# Patient Record
Sex: MALE | Race: WHITE | NOT HISPANIC OR LATINO | Employment: FULL TIME | URBAN - NONMETROPOLITAN AREA
[De-identification: names, ages, dates, MRNs, and addresses within clinical notes are randomized per-mention and may not be internally consistent; named-entity substitution may affect disease eponyms.]

---

## 2020-12-08 ENCOUNTER — ALLIED HEALTH/NURSE VISIT (OUTPATIENT)
Dept: FAMILY MEDICINE | Facility: OTHER | Age: 25
End: 2020-12-08
Attending: FAMILY MEDICINE
Payer: COMMERCIAL

## 2020-12-08 DIAGNOSIS — R05.9 COUGH: Primary | ICD-10-CM

## 2020-12-08 PROCEDURE — U0003 INFECTIOUS AGENT DETECTION BY NUCLEIC ACID (DNA OR RNA); SEVERE ACUTE RESPIRATORY SYNDROME CORONAVIRUS 2 (SARS-COV-2) (CORONAVIRUS DISEASE [COVID-19]), AMPLIFIED PROBE TECHNIQUE, MAKING USE OF HIGH THROUGHPUT TECHNOLOGIES AS DESCRIBED BY CMS-2020-01-R: HCPCS | Mod: ZL | Performed by: FAMILY MEDICINE

## 2020-12-08 PROCEDURE — 99207 PR NO CHARGE NURSE ONLY: CPT

## 2020-12-08 PROCEDURE — C9803 HOPD COVID-19 SPEC COLLECT: HCPCS

## 2020-12-09 LAB
SARS-COV-2 RNA SPEC QL NAA+PROBE: NOT DETECTED
SPECIMEN SOURCE: NORMAL

## 2020-12-14 ENCOUNTER — HEALTH MAINTENANCE LETTER (OUTPATIENT)
Age: 25
End: 2020-12-14

## 2021-10-03 ENCOUNTER — HEALTH MAINTENANCE LETTER (OUTPATIENT)
Age: 26
End: 2021-10-03

## 2021-11-08 ENCOUNTER — HOSPITAL ENCOUNTER (INPATIENT)
Facility: HOSPITAL | Age: 26
LOS: 2 days | Discharge: HOME | End: 2021-11-10
Attending: STUDENT IN AN ORGANIZED HEALTH CARE EDUCATION/TRAINING PROGRAM | Admitting: STUDENT IN AN ORGANIZED HEALTH CARE EDUCATION/TRAINING PROGRAM
Payer: COMMERCIAL

## 2021-11-08 DIAGNOSIS — T14.91XA SUICIDE ATTEMPT (H): Primary | ICD-10-CM

## 2021-11-08 LAB
ALBUMIN SERPL-MCNC: 4.4 G/DL (ref 3.4–5)
ALP SERPL-CCNC: 87 U/L (ref 40–150)
ALT SERPL W P-5'-P-CCNC: 64 U/L (ref 0–70)
AMPHETAMINES UR QL: NOT DETECTED
ANION GAP SERPL CALCULATED.3IONS-SCNC: 4 MMOL/L (ref 3–14)
APAP SERPL-MCNC: <2 MG/L (ref 10–30)
AST SERPL W P-5'-P-CCNC: 27 U/L (ref 0–45)
BARBITURATES UR QL SCN: NOT DETECTED
BENZODIAZ UR QL SCN: NOT DETECTED
BILIRUB SERPL-MCNC: 0.5 MG/DL (ref 0.2–1.3)
BUN SERPL-MCNC: 16 MG/DL (ref 7–30)
BUPRENORPHINE UR QL: NOT DETECTED
CALCIUM SERPL-MCNC: 9.3 MG/DL (ref 8.5–10.1)
CANNABINOIDS UR QL: NOT DETECTED
CHLORIDE BLD-SCNC: 109 MMOL/L (ref 94–109)
CO2 SERPL-SCNC: 27 MMOL/L (ref 20–32)
COCAINE UR QL SCN: NOT DETECTED
COHGB MFR BLD: 1.6 % (ref 0–2)
CREAT SERPL-MCNC: 0.97 MG/DL (ref 0.66–1.25)
D-METHAMPHET UR QL: NOT DETECTED
ERYTHROCYTE [DISTWIDTH] IN BLOOD BY AUTOMATED COUNT: 13.1 % (ref 10–15)
GFR SERPL CREATININE-BSD FRML MDRD: >90 ML/MIN/1.73M2
GLUCOSE BLD-MCNC: 86 MG/DL (ref 70–99)
HCT VFR BLD AUTO: 47.2 % (ref 40–53)
HGB BLD-MCNC: 16.3 G/DL (ref 13.3–17.7)
LACTATE SERPL-SCNC: 1 MMOL/L (ref 0.7–2)
MCH RBC QN AUTO: 28.7 PG (ref 26.5–33)
MCHC RBC AUTO-ENTMCNC: 34.5 G/DL (ref 31.5–36.5)
MCV RBC AUTO: 83 FL (ref 78–100)
METHADONE UR QL SCN: NOT DETECTED
OPIATES UR QL SCN: NOT DETECTED
OXYCODONE UR QL SCN: NOT DETECTED
PCP UR QL SCN: NOT DETECTED
PLATELET # BLD AUTO: 260 10E3/UL (ref 150–450)
POTASSIUM BLD-SCNC: 3.9 MMOL/L (ref 3.4–5.3)
PROPOXYPH UR QL: NOT DETECTED
PROT SERPL-MCNC: 8 G/DL (ref 6.8–8.8)
RBC # BLD AUTO: 5.68 10E6/UL (ref 4.4–5.9)
SARS-COV-2 RNA RESP QL NAA+PROBE: NEGATIVE
SODIUM SERPL-SCNC: 140 MMOL/L (ref 133–144)
TRICYCLICS UR QL SCN: NOT DETECTED
WBC # BLD AUTO: 9.5 10E3/UL (ref 4–11)

## 2021-11-08 PROCEDURE — 99223 1ST HOSP IP/OBS HIGH 75: CPT | Performed by: NURSE PRACTITIONER

## 2021-11-08 PROCEDURE — 99284 EMERGENCY DEPT VISIT MOD MDM: CPT | Performed by: STUDENT IN AN ORGANIZED HEALTH CARE EDUCATION/TRAINING PROGRAM

## 2021-11-08 PROCEDURE — C9803 HOPD COVID-19 SPEC COLLECT: HCPCS

## 2021-11-08 PROCEDURE — 83605 ASSAY OF LACTIC ACID: CPT | Performed by: STUDENT IN AN ORGANIZED HEALTH CARE EDUCATION/TRAINING PROGRAM

## 2021-11-08 PROCEDURE — 93010 ELECTROCARDIOGRAM REPORT: CPT | Performed by: INTERNAL MEDICINE

## 2021-11-08 PROCEDURE — 124N000001 HC R&B MH

## 2021-11-08 PROCEDURE — 82375 ASSAY CARBOXYHB QUANT: CPT | Performed by: STUDENT IN AN ORGANIZED HEALTH CARE EDUCATION/TRAINING PROGRAM

## 2021-11-08 PROCEDURE — 93005 ELECTROCARDIOGRAM TRACING: CPT

## 2021-11-08 PROCEDURE — 80143 DRUG ASSAY ACETAMINOPHEN: CPT | Performed by: STUDENT IN AN ORGANIZED HEALTH CARE EDUCATION/TRAINING PROGRAM

## 2021-11-08 PROCEDURE — 250N000013 HC RX MED GY IP 250 OP 250 PS 637: Performed by: NURSE PRACTITIONER

## 2021-11-08 PROCEDURE — 99285 EMERGENCY DEPT VISIT HI MDM: CPT | Mod: 25

## 2021-11-08 PROCEDURE — 85027 COMPLETE CBC AUTOMATED: CPT | Performed by: STUDENT IN AN ORGANIZED HEALTH CARE EDUCATION/TRAINING PROGRAM

## 2021-11-08 PROCEDURE — 36415 COLL VENOUS BLD VENIPUNCTURE: CPT | Performed by: STUDENT IN AN ORGANIZED HEALTH CARE EDUCATION/TRAINING PROGRAM

## 2021-11-08 PROCEDURE — 80306 DRUG TEST PRSMV INSTRMNT: CPT | Performed by: STUDENT IN AN ORGANIZED HEALTH CARE EDUCATION/TRAINING PROGRAM

## 2021-11-08 PROCEDURE — U0003 INFECTIOUS AGENT DETECTION BY NUCLEIC ACID (DNA OR RNA); SEVERE ACUTE RESPIRATORY SYNDROME CORONAVIRUS 2 (SARS-COV-2) (CORONAVIRUS DISEASE [COVID-19]), AMPLIFIED PROBE TECHNIQUE, MAKING USE OF HIGH THROUGHPUT TECHNOLOGIES AS DESCRIBED BY CMS-2020-01-R: HCPCS | Performed by: STUDENT IN AN ORGANIZED HEALTH CARE EDUCATION/TRAINING PROGRAM

## 2021-11-08 PROCEDURE — 80053 COMPREHEN METABOLIC PANEL: CPT | Performed by: STUDENT IN AN ORGANIZED HEALTH CARE EDUCATION/TRAINING PROGRAM

## 2021-11-08 RX ORDER — NICOTINE 21 MG/24HR
1 PATCH, TRANSDERMAL 24 HOURS TRANSDERMAL DAILY
Status: DISCONTINUED | OUTPATIENT
Start: 2021-11-08 | End: 2021-11-10

## 2021-11-08 RX ORDER — OLANZAPINE 10 MG/1
10 TABLET ORAL 2 TIMES DAILY PRN
Status: DISCONTINUED | OUTPATIENT
Start: 2021-11-08 | End: 2021-11-10 | Stop reason: HOSPADM

## 2021-11-08 RX ORDER — ACETAMINOPHEN 325 MG/1
650 TABLET ORAL EVERY 4 HOURS PRN
Status: DISCONTINUED | OUTPATIENT
Start: 2021-11-08 | End: 2021-11-10 | Stop reason: HOSPADM

## 2021-11-08 RX ORDER — HYDROXYZINE PAMOATE 25 MG/1
25 CAPSULE ORAL 3 TIMES DAILY PRN
Status: ON HOLD | COMMUNITY
Start: 2021-09-14 | End: 2021-11-10

## 2021-11-08 RX ORDER — AMOXICILLIN 250 MG
1 CAPSULE ORAL 2 TIMES DAILY PRN
Status: DISCONTINUED | OUTPATIENT
Start: 2021-11-08 | End: 2021-11-10 | Stop reason: HOSPADM

## 2021-11-08 RX ORDER — SERTRALINE HYDROCHLORIDE 100 MG/1
100 TABLET, FILM COATED ORAL DAILY
Status: ON HOLD | COMMUNITY
Start: 2021-09-14 | End: 2021-11-10

## 2021-11-08 RX ORDER — LANOLIN ALCOHOL/MO/W.PET/CERES
3 CREAM (GRAM) TOPICAL
Status: DISCONTINUED | OUTPATIENT
Start: 2021-11-08 | End: 2021-11-10 | Stop reason: HOSPADM

## 2021-11-08 RX ORDER — OLANZAPINE 10 MG/2ML
10 INJECTION, POWDER, FOR SOLUTION INTRAMUSCULAR 2 TIMES DAILY PRN
Status: DISCONTINUED | OUTPATIENT
Start: 2021-11-08 | End: 2021-11-10 | Stop reason: HOSPADM

## 2021-11-08 RX ORDER — MAGNESIUM HYDROXIDE/ALUMINUM HYDROXICE/SIMETHICONE 120; 1200; 1200 MG/30ML; MG/30ML; MG/30ML
30 SUSPENSION ORAL EVERY 4 HOURS PRN
Status: DISCONTINUED | OUTPATIENT
Start: 2021-11-08 | End: 2021-11-10 | Stop reason: HOSPADM

## 2021-11-08 RX ADMIN — SERTRALINE HYDROCHLORIDE 100 MG: 50 TABLET ORAL at 16:50

## 2021-11-08 ASSESSMENT — ACTIVITIES OF DAILY LIVING (ADL)
DOING_ERRANDS_INDEPENDENTLY_DIFFICULTY: NO
WALKING_OR_CLIMBING_STAIRS_DIFFICULTY: NO
WEAR_GLASSES_OR_BLIND: NO
ADLS_ACUITY_SCORE: 7
DIFFICULTY_EATING/SWALLOWING: NO
DIFFICULTY_COMMUNICATING: NO
TOILETING_ISSUES: NO
CONCENTRATING,_REMEMBERING_OR_MAKING_DECISIONS_DIFFICULTY: NO
FALL_HISTORY_WITHIN_LAST_SIX_MONTHS: NO
DRESSING/BATHING_DIFFICULTY: NO
HEARING_DIFFICULTY_OR_DEAF: NO

## 2021-11-08 ASSESSMENT — MIFFLIN-ST. JEOR: SCORE: 2174.21

## 2021-11-08 NOTE — ED TRIAGE NOTES
Pt is being admitted to [facility] mental health.  Pt unsure who actually set up .  Before pt is accepted they wanted him to be evaluated prior to him be admitted. Pt attempted suicide Friday evening, stuffed tail pipe out side and was in vehicle for about 30 minutes. Pt was not brought in and he contacted the national suicide hotline.

## 2021-11-08 NOTE — ED NOTES
"Patient presents to the ED requesting a mental health evaluation after trying to commit suicide. Patient reports that on Friday, 11/5/21, he triedclogging the tail pipe of his car. Patient reports that it came out after 30 seconds and \"it as a sign to stop.\" Patient reports that he contacted the suicide hotline to get assessed. Patient has current or future plan to harm/kill himself. Patient reports that he hadn't been Zoloft for the past month, but wants to try taking medications again.   "

## 2021-11-08 NOTE — PROGRESS NOTES
11/08/21 1337   Patient Belongings   Did you bring any home meds/supplements to the hospital?  No   Patient Belongings remains with patient;sent to security per site process   Patient Belongings Remaining with Patient cash/credit card;cell phone/electronics;clothing;wallet;shoes  (2 books, a pair of socks, slides, 1 beanie hat, grey sweat pants, sweatshirt, shorts, t shirt, mask, )   Belongings Search Yes   Clothing Search Yes   Second Staff alyssa   List items sent to safe: samsung phone with cracked screen protector, drivers license, brown wallet, 2 medical cards [employer] card,   All other belongings put in assigned cubby in belongings room.       I have reviewed my belongings list on admission and verify that it is correct.     Patient signature_______________________________    Second staff witness (if patient unable to sign) ______________________________       I have received all my belongings at discharge.    Patient signature________________________________    Faiza   11/8/2021  1:43 PM

## 2021-11-08 NOTE — SAFE
Jose Alexander  November 8, 2021  SAFE Note    Critical Safety Issues: Pt reports he does not feel safe discharging at this time. Pt was not able to identify any coping skills that are effective at this time at reducing his elevated mental health symptoms and he was not able to create an appropriate safety plan with this . Pt's current elevated mental health symptoms leave him vulnerable to keeping himself safe at this time. Pt meets criteria for IP MH admission. Pt acknowledged the severity of his current status. Pt is open to inpatient psychiatric hospitalization.         Current Suicidal Ideation/Self-Injurious Concerns/Methods: Asphyxiation      Current or Historical Inappropriate Sexual Behavior: No      Current or Historical Aggression/Homicidal Ideation: None - N/A      Triggers: Pt did not reports any triggers that are contributing to his increased MH symptoms and intense SI.    Updated care team: Yes: Attending provider, Dr. Martini consulted and does agree with recommended disposition which includes Inpatient Mental Health. Patient agrees with recommended level of care. Pt will be admitted to [facility].    For additional details see full [facility] assessment.       BOB Diez

## 2021-11-08 NOTE — ED PROVIDER NOTES
History     Chief Complaint   Patient presents with     Psychiatric Evaluation     HPI  Jose Alexander is a 26 year old male with history of anxiety and depression who presents to the emergency department today after a suicide attempt 3 days ago.  He states he has been struggling with depression, has been off of his Zoloft, tried to commit suicide by carbon monoxide exposure, but the tubing fell off after about 30 seconds and he took that is a sign that he should not commit suicide.  He has been working through outpatient resources to find an inpatient stay to help him work on his depression and suicidal thoughts.  He has been having no suicidal thoughts since then.  Denies hallucinations, drugs, alcohol  No other complaints at this time.    Allergies:  No Known Allergies    Problem List:    Patient Active Problem List    Diagnosis Date Noted     Suicide attempt (H) 11/08/2021     Priority: Medium        Past Medical History:    No past medical history on file.    Past Surgical History:    No past surgical history on file.    Family History:    No family history on file.    Social History:  Marital Status:  Single [1]  Social History     Tobacco Use     Smoking status: Not on file     Smokeless tobacco: Not on file   Substance Use Topics     Alcohol use: Not on file     Drug use: Not on file        Medications:    hydrOXYzine (VISTARIL) 25 MG capsule  sertraline (ZOLOFT) 100 MG tablet          Review of Systems  A complete review of systems was performed and is otherwise negative.     Physical Exam   BP: 146/89  Pulse: 93  Temp: 99.5  F (37.5  C)  Resp: 16  SpO2: 99 %      Physical Exam  Constitutional: Alert and conversant. NAD   HENT: NCAT   Eyes: Normal pupils   Neck: supple   CV: No pallor  Pulmonary/Chest: Non-labored respirations  Abdominal: non-distended   MSK: RIVERA.   Neuro: Alert and appropriate   Skin: Warm and dry. No diaphoresis. No rashes on exposed skin    Psych: Mood and affect: Normal. Eye  contact: Normal. Internal stimuli: None. Thought process and content: Linear, good insight and judgment. Speech: Normal. Grooming: In our paper scrubs. Suicidal Ideation: Denies at this time.      ED Course     ED Course as of 11/08/21 1204   Mon Nov 08, 2021   1055 26-year-old male here 3 days after a suicide attempt, estimates about 30 seconds of exposure to carbon monoxide.  His primary goal here is to seek an access inpatient care for his suicide attempt.  He states that he has not tried to attempt suicide since then   1057 Patient admitted to behavioral health he is holdable, but staying voluntarily.   1159 Patient did have a carbon monoxide exposure, this was sometime ago however.  Given the suicide attempt, laboratory EKG evaluation indicated.  Negative acetaminophen, no evidence of liver injury.  Carbon monoxide 1.6 within the normal range, lactate 1 no elevated white blood cell count EKG without signs of ischemia, no STEMI.  Low concern for any lasting injury from the carbon monoxide exposure.  Medically clear at this point for psychiatric admission after suicide attempt.     Procedures              Results for orders placed or performed during the hospital encounter of 11/08/21 (from the past 24 hour(s))   CBC with platelets   Result Value Ref [facility]    WBC Count 9.5 4.0 - 11.0 10e3/uL    RBC Count 5.68 4.40 - 5.90 10e6/uL    Hemoglobin 16.3 13.3 - 17.7 g/dL    Hematocrit 47.2 40.0 - 53.0 %    MCV 83 78 - 100 fL    MCH 28.7 26.5 - 33.0 pg    MCHC 34.5 31.5 - 36.5 g/dL    RDW 13.1 10.0 - 15.0 %    Platelet Count 260 150 - 450 10e3/uL   Carbon monoxide   Result Value Ref [facility]    Carbon Monoxide 1.6 0.0 - 2.0 %   Lactic acid whole blood   Result Value Ref [facility]    Lactic Acid 1.0 0.7 - 2.0 mmol/L   Acetaminophen level   Result Value Ref [facility]    Acetaminophen <2 (L) 10 - 30 mg/L   Comprehensive metabolic panel   Result Value Ref [facility]    Sodium 140 133 - 144 mmol/L    Potassium 3.9 3.4 - 5.3 mmol/L     Chloride 109 94 - 109 mmol/L    Carbon Dioxide (CO2) 27 20 - 32 mmol/L    Anion Gap 4 3 - 14 mmol/L    Urea Nitrogen 16 7 - 30 mg/dL    Creatinine 0.97 0.66 - 1.25 mg/dL    Calcium 9.3 8.5 - 10.1 mg/dL    Glucose 86 70 - 99 mg/dL    Alkaline Phosphatase 87 40 - 150 U/L    AST 27 0 - 45 U/L    ALT 64 0 - 70 U/L    Protein Total 8.0 6.8 - 8.8 g/dL    Albumin 4.4 3.4 - 5.0 g/dL    Bilirubin Total 0.5 0.2 - 1.3 mg/dL    GFR Estimate >90 >60 mL/min/1.73m2   Urine Drugs of Abuse Screen    Narrative    The following orders were created for panel order Urine Drugs of Abuse Screen.  Procedure                               Abnormality         Status                     ---------                               -----------         ------                     Urine Drugs of Abuse Scr...[[id number]]  Normal              Final result                 Please view results for these tests on the individual orders.   Urine Drugs of Abuse Screen Panel 13   Result Value Ref [facility]    Cannabinoids (28-wik-2-carboxy-9-THC) Not Detected Not Detected, Indeterminate    Phencyclidine Not Detected Not Detected, Indeterminate    Cocaine (Benzoylecgonine) Not Detected Not Detected, Indeterminate    Methamphetamine (d-Methamphetamine) Not Detected Not Detected, Indeterminate    Opiates (Morphine) Not Detected Not Detected, Indeterminate    Amphetamine (d-Amphetamine) Not Detected Not Detected, Indeterminate    Benzodiazepines (Nordiazepam) Not Detected Not Detected, Indeterminate    Tricyclic Antidepressants (Desipramine) Not Detected Not Detected, Indeterminate    Methadone Not Detected Not Detected, Indeterminate    Barbiturates (Butalbital) Not Detected Not Detected, Indeterminate    Oxycodone Not Detected Not Detected, Indeterminate    Propoxyphene (Norpropoxyphene) Not Detected Not Detected, Indeterminate    Buprenorphine Not Detected Not Detected, Indeterminate       Medications - No data to display    Assessments & Plan (with Medical Decision  Making)     I have reviewed the nursing notes.    I have reviewed the findings, diagnosis, plan and need for follow up with the patient.    New Prescriptions    No medications on file       Final diagnoses:   Suicide attempt (H)       11/8/2021   [facility]     David Martini MD  11/08/21 [id number]

## 2021-11-08 NOTE — ED NOTES
Patient being transferred to [address] [place] [address]. Patient aware of plan of care and in agreement. Report called Monica ROSADO. Patient stable at this time.

## 2021-11-08 NOTE — PLAN OF CARE
ADMISSION NOTE    Reason for admission: Increasing Depression, and currently off medications. Recent Suicide Attempt- Pt on Friday evening stuffed tall pipe of vehicle. Pt was in vehicle for roughly 30 seconds, when tubing fell out and pt took as sign to not commit suicide. Pt contacted National Suicide Hotline and was directed to come in for evaluation.     Safety concerns: Past suicide attempts- Risk for self harm.     Risk for or history of violence: Pt reports currently on probation for past sexual charges.     Full skin assessment: Pt has scar to  Top of R foot from previous surgery. No other skin alterations, bruises, cuts, rashes etc.       Patient arrived on unit from ER accompanied by staff on 11/8/2021  13:13 PM.   Status on arrival: Pt is calm and cooperative upon arrival. Pt is alert and orientated x4.   Pt denies pain upon arrival to unit. Pt denies current thoughts of self harm and/or SI.   Pt cooperative with staff at this to complete admission assessment.   /93   Pulse 96   Temp 97.8  F (36.6  C) (Tympanic)   Resp 16   Ht 1.829 m (6')   Wt 116.1 kg (256 lb)   SpO2 98%   BMI 34.72 kg/m    Patient given tour of unit and Welcome to  unit papers given to patient, wanding completed, belongings inventoried, and admission assessment completed.   Patient's legal status on arrival is voluntarty. Appropriate legal rights discussed with and copy given to patient. Patient Bill of Rights discussed with and copy given to patient.   Patient denies SI, HI, and thoughts of self harm and contracts for safety while on unit.      Monica Gaona RN  11/8/2021  2:38 PM

## 2021-11-08 NOTE — ED NOTES
11/8/2021  Jose ANISA Alexander 1995     [facility] Crisis Assessment:    Started at: 11:28am  Completed at: 1:00pm  Patient was assessed via virtually ([employer] cart or other teleconferencing device).  Patient Location: [facility]    Chief Complaint and History of Presenting Problem:    Patient is a 26 year old  male who presented to the ED by Family/Friends related to concerns for mental health evaluation following a suicide attempt on Friday night (3 days ago). Pt endorsed that he attempted suicide Friday evening by clogging the tail pipe of his care. Pt reports that what he used to clog the tailpipe came out after 30 seconds and he took it as a sign to reach out for help. Pt reports that he contacted the national suicide hotline on Friday night and they recommended that he come in to get assessed. Pt came in to the ED voluntarily this morning accompanied by his mom.     Assessment and intervention involved meeting with pt, obtaining collateral from [facility] and [facility] Everywhere records and consulting with ED MD, employing crisis psychotherapy including: Establishing rapport, Active listening, Assess dimensions of crisis, Apply solution-focused therapy to address current crisis, Identify additional supports and alternative coping skills, Motivational Interviewing, Brief Supportive Therapy and Trauma-Informed Care.     Biopsychosocial Background and Demographic Information    Pt lives in [place], [place] with his mom and step-dad. Pt reports that his mom is a positive support system for him. Pt reports that he is employed and denies any legal concerns.     Mental Health History and Current Symptoms   Mental Health History (prior psychiatric hospitalizations, civil commitments, programmatic care, etc):  Patient identifies historical diagnoses of depression and anxiety.  At baseline, patient describes their mental health symptoms as getting progressively worse. Pt reports a Hx of passive SI that has been getting  "progressively worse over the past couple of weeks. Pt reports current SI with intent and as noted above, attempted to end his life this past Friday night. Pt reports a Hx of 1 other attempt using the same method \"2-3 years ago\" but reports that used a hose in the exhaust pipe rather than trying to clog it, as he did this time. Pt reports that he has been non-compliant with his meds prescribed by his PCP for the past month due to not feeling as though they were working. Pt denies a Hx of prior psychiatric hospitalizations, civil commitments, or programmatic care, and reports no current established MH providers.     Family Mental and Chemical Health History: Depression     Current and Historic Psychotropic Medications: Pt reports that he has been non-compliant with his meds prescribed by his PCP for the past month due to not feeling as though they were working.   Medication Adherent: No  Recent medication changes? No    Relevant Medical Concerns  Patient identifies concerns with completing ADLs? No  Patient can ambulate independently? Yes  Other medical health concerns? No  History of concussion or TBI? No     Trauma History   Physical, Emotional, or Sexual abuse: No  Loss of a friend or family member to suicide: No  Other identified traumatic event or significant stressor: No    Substance Use History and Treatments  Pt denies     Drug screen/BAL/Breathalyzer Completed? Yes  Results: Negative     History of Suicidal Ideation, Suicide Attempts, Non-Suicidal Self Injury, and Risk Formulation:   Details of Current Ideation, Attempt(s), Plan(s): Pt reports a Hx of passive SI that has been getting progressively worse over the past couple of weeks. Pt reports current SI with intent and as noted above, attempted to end his life this past Friday night.   Risk factors: Yes history of suicide attempt(s), access to lethal means, helplessness/hopelessness, refuses to or feels unable to engage in safety planning and disengagement " "with or distrust of medical/mental health care.   Protective factors: Yes strong bond to family/friends, responsibilities to others (spouse, pets, children, etc.) and reality testing ability.  History and Prior Methods of Self-injury: Pt denies  History of Suicide Attempts: Pt reports a Hx of 1 other attempt using the same method \"2-3 years ago\" but reports that used a hose in the exhaust pipe rather than trying to clog it, as he did this time.    ESS-6  1.a. Over the past 2 weeks, have you had thoughts of killing yourself? Yes   1.b. Have you ever attempted to kill yourself and, if yes, when did this last happen? Yes As noted above, attempted to end his life this past Friday night.   2. Recent or current suicide plan? Yes  3. Recent or current intent to act on ideation? Yes  4. Lifetime psychiatric hospitalization? No  5. Pattern of excessive substance use? No  6. Current irritability, agitation, or aggression? No  ESS-6 Score: 4    Other Risk Areas  Aggressive/assumptive/homicidal risk factors: No   Sexually inappropriate behavior? No      Vulnerability to sexual exploitation? No     Clinical Presentation and Current Symptoms:    Attention, Hyperactivity, and Impulsivity: No   Anxiety:No    Behavioral Difficulties: No   Mood Symptoms: Yes: Appetite change/weight change , Feelings of helplessness , Sad, depressed mood , Sleep disturbance  and Thoughts of suicide/death    Appetite: Yes: Loss of Appetite   Feeding and Eating: No  Interpersonal Functioning: No  Learning Disabilities/Cognitive/Developmental Disorders: No   General Cognitive Impairments: No  If yes, see completed Mini-Cog Assessment below.  Sleep: Yes: Difficulty falling asleep  and Difficulty staying sleep    Psychosis: No    Trauma: No     Mental Status Exam:  Affect: Appropriate  Appearance: Appropriate   Attention Span/Concentration: Attentive    Eye Contact: Engaged  Fund of Knowledge: Appropriate   Language /Speech Content: Fluent and " "Non-fluent  Language /Speech Volume: Normal   Language /Speech Rate/Productions: Normal   Recent Memory: Intact  Remote Memory: Intact  Mood: Depressed   Orientation:   Person: Yes   Place: Yes  Time of Day: Yes   Date: Yes   Situation (Do they understand why they are here?): Yes   Psychomotor Behavior: Normal   Thought Content: Suicidal  Thought Form: Goal Directed    Current Providers and Contact Information   Patient is his own legal guardian.     [facility]  Psychiatrist: No  Therapist: No  : No  CTSS or ARMHS: No  ACT Team: No  Other: Yes, provider details not recalled    Has an SAVANNAH been signed? Yes ; For Jose Alexander; By: Jose Alexander; Relationship to patient Self.     Clinical Summary and Recommendations    Clinical summary of assessment (include strengths, protective factors, community resources, and assessment of vulnerability/risk):   Pt was engaged throughout the assessment, and able to articulate his thoughts and feelings. Patient presents as alert and oriented. Grooming is appropriate. Eye contact is engaged. Thoughts are organized and linear. Speech is normal volume, normal rate, rhythm, and tone. Judgment and insight are fair. Pt denies any psychosis symptoms. As noted above, Pt reports a Hx of passive SI that has been getting progressively worse over the past couple of weeks. Pt reports current SI with intent and as noted above, attempted to end his life this past Friday night. Pt reports a Hx of 1 other attempt using the same method \"2-3 years ago\" but reports that used a hose in the exhaust pipe rather than trying to clog it, as he did this time. Pt reports that he has been non-compliant with his meds prescribed by his PCP for the past month due to not feeling as though they were working. Pt denies a Hx of prior psychiatric hospitalizations, civil commitments, or programmatic care, and reports no current established MH providers. " Pt reports he does not feel safe discharging at this time. Pt was not able to identify any coping skills that are effective at this time at reducing his elevated mental health symptoms and he was not able to create an appropriate safety plan with this . Pt's current elevated mental health symptoms leave him vulnerable to keeping himself safe at this time. Pt meets criteria for IP MH admission. Pt acknowledged the severity of his current status. Pt is open to inpatient psychiatric hospitalization.     Vulnerabilities: MH diagnosis, current elevated MH symptoms, no established mental health providers, and limited ability to utilize coping skills at times.    Protective factors: Pt is motivated to seek help, exhibits resiliency and has a positive support system.    Diagnosis with F Codes:  F33.2    Disposition  Attending provider, Dr. Martini consulted and does  agree with recommended disposition which includes Inpatient Mental Health. Patient agrees with recommended level of care.      Details of final disposition include: Inpatient mental health . [facility].     If Inpatient, is patient admitted voluntary? Yes   Patient aware of potential for transfer if there is not appropriate placement? Yes  Patient is willing to travel outside of the [facility] for placement? Yes   Central Intake Notified? Yes: Date: 11/8/21 Time: 12:15 PM.   If Discharging, what are follow up needs? NA    Duration of assessment time: 1.0 hrs    CPT code(s) utilized: 59318, up to 74 minutes      BOB Diez

## 2021-11-09 PROBLEM — F33.1 MODERATE EPISODE OF RECURRENT MAJOR DEPRESSIVE DISORDER (H): Status: ACTIVE | Noted: 2021-09-14

## 2021-11-09 PROCEDURE — 250N000013 HC RX MED GY IP 250 OP 250 PS 637: Performed by: NURSE PRACTITIONER

## 2021-11-09 PROCEDURE — 124N000001 HC R&B MH

## 2021-11-09 RX ORDER — VENLAFAXINE HYDROCHLORIDE 75 MG/1
75 CAPSULE, EXTENDED RELEASE ORAL
Status: DISCONTINUED | OUTPATIENT
Start: 2021-11-09 | End: 2021-11-10 | Stop reason: HOSPADM

## 2021-11-09 RX ORDER — PROPRANOLOL HYDROCHLORIDE 10 MG/1
10 TABLET ORAL 2 TIMES DAILY
Status: DISCONTINUED | OUTPATIENT
Start: 2021-11-09 | End: 2021-11-10 | Stop reason: HOSPADM

## 2021-11-09 RX ADMIN — VENLAFAXINE HYDROCHLORIDE 75 MG: 75 CAPSULE, EXTENDED RELEASE ORAL at 09:37

## 2021-11-09 RX ADMIN — PROPRANOLOL HYDROCHLORIDE 10 MG: 10 TABLET ORAL at 20:29

## 2021-11-09 RX ADMIN — PROPRANOLOL HYDROCHLORIDE 10 MG: 10 TABLET ORAL at 09:37

## 2021-11-09 RX ADMIN — NALTREXONE HYDROCHLORIDE 25 MG: 50 TABLET, FILM COATED ORAL at 09:37

## 2021-11-09 ASSESSMENT — ACTIVITIES OF DAILY LIVING (ADL)
HYGIENE/GROOMING: INDEPENDENT
DRESS: SCRUBS (BEHAVIORAL HEALTH);INDEPENDENT
LAUNDRY: UNABLE TO COMPLETE
ORAL_HYGIENE: INDEPENDENT

## 2021-11-09 NOTE — PLAN OF CARE
Face to face received from Elvira GALLARDO RN.   Rounding completed, pt observed in room this morning.     Pt out in lounge this morning for breakfast. Provider in to see pt this morning.   Pt receives 1st dose of naltrexone, effexor, and propranolol this morning. Pt denies concerns and/or questions r/t new medications.   Pt denies pain this morning.   Pt denies SI and/or HI.   Pt does not appear to responding to internal stimuli.      Pt in room this afternoon, napping.     Problem: Behavioral Health Plan of Care  Goal: Patient-Specific Goal (Individualization)  Description: Pt will be compliant with treatment team recommendations during hospitalization.   Pt will report adequate amounts of sleep on NOC shift (5-8 hours.)  Pt will participate in greater than 50% of daily groups.    Outcome: Improving  Note:       Problem: Suicide Risk  Goal: Absence of Self-Harm  Outcome: Improving     Problem: Suicidal Behavior  Goal: Suicidal Behavior is Absent or Managed  Outcome: Improving    Face to face end of shift report communicated to oncpatsy RN.     Monica Gaona RN  11/9/2021  1:01 PM

## 2021-11-09 NOTE — PLAN OF CARE
Problem: Behavioral Health Plan of Care  Goal: Patient-Specific Goal (Individualization)  Description: Pt will be compliant with treatment team recommendations during hospitalization.   Pt will report adequate amounts of sleep on NOC shift (5-8 hours.)  Pt will participate in greater than 50% of daily groups.      Outcome: No Change    Problem: Suicide Risk  Goal: Absence of Self-Harm  Outcome: No Change     Problem: Suicidal Behavior  Goal: Suicidal Behavior is Absent or Managed  Outcome: No Change     Face to face shift report received from ALONZO Clinton. Rounding completed, pt observed laying in bed, appeared to be sleeping.    Patient appeared to be sleeping for approximately 3 hours on night shift and 2.5 on evenings for a total of approximately 5.5 hours.    Patient had no reported self harm this shift.      Face to face report will be communicated to oncoming RN.    Elvira Martinez RN  11/9/2021  6:44 AM

## 2021-11-09 NOTE — PLAN OF CARE
Prior to Admission Medication Reconciliation:     Medications added:   [x] None  [] As listed below:    Medications deleted:   [x] None  [] As listed below:    Medications marked for review/removal by attending:  [x] None  [] As listed below:    Changes made to existing medications:   [] None  [x] Updated strengths and frequencies to most current  [] As listed below:    Last times/dates taken verified with patient:  [] Yes- completed myself  [] Prepared PTA medlist for review only. (will not be available to review personally)  [] Did not review with patient. Rx verification only. Review completed by nursing.    [x] Nurse completed no changes made (double checked entries)  [] Unable to review with patient at this time:  [] Nurse completed/changes made:     Allergies listed at another location:  [x]Allergies match allergies listed in Epic  []Other allergies listed:    Allergy review:    []Did not review: reviewed by nursing  []Did not review: pt unable at this time  [x]Patient/MAR verified NKDA  []Patient/MAR verified current existing allergies: no changes made  []Patient confirmed current existing allergies and new allergies added:    Medication reconciliation sources:   [x]Patient  []Patient family member/emergency contact: **  [][facility] Report Review  []Epic Chart Review  [x]Care Everywhere review:  Medication Sig Dispensed Refills Start Date End Date Status   sertraline (Zoloft) 100 MG tablet   Take 1 Tablet by mouth one time a day. 30 Tablet   5 09/14/2021   Active   hydrOXYzine pamoate (Vistaril) 25 MG capsule   1 tab tid prn anxiety 30 Capsule   3 09/14/2021   Active   []Pharmacy med list: **  []Pharmacy phone call  [x]Outside meds dispense report: see below  []Nursing home or Assisted Living MAR:  []Other: **    Pharmacy desired at discharge: echo    Is patient on coumadin?  [x]No    Requests for consultation by provider or pharmacist:   [x] Patient understands why all of their meds were prescribed and  how to take them. No questions.   [] Managing party has no questions.   [] Patient/ managing party has questions about the following:  [] Did not review with patient. Cannot assess.     Fill dates and reported compliancy:  [x] Fill dates coincide with reported compliancy for all/most maintenance meds.   [] Fill dates do not coincide with compliancy with maintenance meds. See notes in PTA medlist and in comments.    [] Fill dates do not coincide with the following medications but pt reports compliancy:  [] Did not review with patient. Cannot assess.     Historian accuracy:  [x] Excellent- alert and oriented, understands why meds were prescribed and how to take, able to answer specifics  [] Good- alert and oriented, understands why meds were prescribed and how to take, some confusion   [] Fair- alert and oriented, doesn't know medications without list, cannot answer specifics about medications, but has a decent process for which to take at home  [] Poor- does not know medications, may not have a process to take at home, may be cognitively unable to review at this time  []Medication management done by family member or facility, no concerns about historian accuracy.   [] Did not review with patient. Cannot assess.     Medication Management:  [x] Manages meds independently  [] Family member/ other party manages meds/assists:  [] Meds managed by staff at facility  [] Meds set up by home care, family/other party helps administer  [] Meds set up by home care, self administers  [] Did not review with patient. Cannot assess.     Other medications aside from PTA:  [x] Denies taking any medications aside from those listed in PTA meds  [] Reports taking another medication(s) but cannot recall the name(s)  [] Refuses to say.  [] Did not review with patient. Cannot assess.     Comments: none    Tatiana Kelly on 11/9/2021 at 10:14 AM       Discrepancies: [x] No []Not Applicable [x]Yes: listed below    Issues completing PTA  medication reconciliation:  [] On hold for a long time  [] Waited for a call back  [] Fax didn't come through  [] Fax took a long time  [] Other:    Notifying appropriate party of changes/additions/discrepancies:  [x]No pertinent changes made, notification not necessary.   [] Notified attending provider via text page/phone call  [] Notified attending provider in person  [] Notified pharmacy  [] Notified nurse  [] Medications have not been reconciled by a provider yet, notification not necessary  [] Pt is not admitted to floor yet, PTA meds completed before admission.     Medications Prior to Admission   Medication Sig Dispense Refill Last Dose     hydrOXYzine (VISTARIL) 25 MG capsule Take 25 mg by mouth 3 times daily as needed    Past Month at Unknown time     sertraline (ZOLOFT) 100 MG tablet Take 100 mg by mouth daily    Past Month at Unknown time             Medication Dispense History (from 11/9/2020 to 11/8/2021)  Expand All  Collapse All    Escitalopram Oxalate     Dispensed Days Supply Quantity Provider Pharmacy   ESCITALOPRAM 10MG TABLETS 11/23/2020 30 30 Units [facility]...           Sertraline HCl     Dispensed Days Supply Quantity Provider Pharmacy   SERTRALINE 100MG TABLETS 10/12/2021 30 30 Units [facility] #...   SERTRALINE 100MG TABLETS 09/14/2021 30 30 Units [facility] #...           hydrOXYzine Pamoate     Dispensed Days Supply Quantity Provider Pharmacy   HYDROXYZINE PAMOATE 25MG CAPSULES 10/13/2021 10 [facility]   HYDROXYZINE PAMOATE 25MG CAPSULES 09/14/2021 10 [facility]           Disclaimer    Certain dispenses may not be available or accurate in this report, including over-the-counter medications, low cost prescriptions, prescriptions paid for by the patient or non-participating sources, or errors in insurance  claims information. The provider should independently verify medication history with the patient.     External Sources
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
## 2021-11-09 NOTE — PLAN OF CARE
"  Problem: Behavioral Health Plan of Care  Goal: Plan of Care Review  Recent Flowsheet Documentation  Taken 11/8/2021 1700 by Oz Anderson RN  Plan of Care Reviewed With: patient  Patient Agreement with Plan of Care: agrees  Goal: Patient-Specific Goal (Individualization)  Description: Pt will be compliant with treatment team recommendations during hospitalization.   Pt will report adequate amounts of sleep on NOC shift (5-8 hours.)  Pt will participate in greater than 50% of daily groups.        Outcome: Improving  Note: Patient has been sleeping this evening as he generally works night shift. He declined supper. He talks of having a failed suicide attempt last Friday. He denies feeling suicidal at this time. \"I just know I need help\". He talks of a suicide attempt \"once before\". He talks of multiple life stressors being the trigger of this event. He is restarted on his zoloft this afternoon. He continues with depression.     Goal: Develops/Participates in [facility] [place] to Support Successful Transition  Intervention: Foster [facility] [place]  Recent Flowsheet Documentation  Taken 11/8/2021 1700 by Oz Anderson RN  Trust Relationship/Rapport:    care explained    questions answered    questions encouraged    reassurance provided    thoughts/feelings acknowledged     Problem: Suicide Risk  Goal: Absence of Self-Harm  Outcome: Improving  Note: He is admitted with suicidal ideations. He denies active suicidal thoughts at this time. He is agreeable to safe behavior on the unit.     Face to face end of shift report to be communicated to night shift RN.     Oz Anderson RN  11/8/2021  10:37 PM            "

## 2021-11-10 VITALS
TEMPERATURE: 97.8 F | DIASTOLIC BLOOD PRESSURE: 59 MMHG | HEIGHT: 72 IN | OXYGEN SATURATION: 98 % | BODY MASS INDEX: 34.67 KG/M2 | RESPIRATION RATE: 18 BRPM | SYSTOLIC BLOOD PRESSURE: 120 MMHG | WEIGHT: 256 LBS | HEART RATE: 71 BPM

## 2021-11-10 PROCEDURE — 250N000013 HC RX MED GY IP 250 OP 250 PS 637: Performed by: NURSE PRACTITIONER

## 2021-11-10 PROCEDURE — 99239 HOSP IP/OBS DSCHRG MGMT >30: CPT | Performed by: NURSE PRACTITIONER

## 2021-11-10 RX ORDER — VENLAFAXINE HYDROCHLORIDE 75 MG/1
75 CAPSULE, EXTENDED RELEASE ORAL
Qty: 30 CAPSULE | Refills: 0 | Status: ON HOLD | OUTPATIENT
Start: 2021-11-11 | End: 2023-01-04

## 2021-11-10 RX ORDER — NALTREXONE HYDROCHLORIDE 50 MG/1
50 TABLET, FILM COATED ORAL DAILY
Status: DISCONTINUED | OUTPATIENT
Start: 2021-11-11 | End: 2021-11-10 | Stop reason: HOSPADM

## 2021-11-10 RX ORDER — PROPRANOLOL HYDROCHLORIDE 10 MG/1
10 TABLET ORAL 2 TIMES DAILY PRN
Qty: 60 TABLET | Refills: 0 | Status: ON HOLD | OUTPATIENT
Start: 2021-11-10 | End: 2023-01-04

## 2021-11-10 RX ORDER — NALTREXONE HYDROCHLORIDE 50 MG/1
50 TABLET, FILM COATED ORAL DAILY
Qty: 30 TABLET | Refills: 0 | Status: SHIPPED | OUTPATIENT
Start: 2021-11-11 | End: 2022-12-08

## 2021-11-10 RX ADMIN — VENLAFAXINE HYDROCHLORIDE 75 MG: 75 CAPSULE, EXTENDED RELEASE ORAL at 08:11

## 2021-11-10 RX ADMIN — NALTREXONE HYDROCHLORIDE 25 MG: 50 TABLET, FILM COATED ORAL at 08:11

## 2021-11-10 RX ADMIN — PROPRANOLOL HYDROCHLORIDE 10 MG: 10 TABLET ORAL at 08:11

## 2021-11-10 RX ADMIN — NICOTINE POLACRILEX 2 MG: 2 GUM, CHEWING ORAL at 10:07

## 2021-11-10 NOTE — DISCHARGE INSTRUCTIONS
Behavioral Discharge Planning and Instructions    Summary: Jose Alexander is a 26 year old male with history of anxiety and depression that presented to the emergency department after a suicide attempt 3 days ago by Carbon Monoxide.     Main Diagnosis:   SI/SA  Mdd, recurrent severe without psychotic features.   Adjustment disorder with depression and annxiety    Health Care Follow-up:     [facility]   PCP- Tomy Cramer- Friday December 3rd @10am  [address] [place] [address]   Phone: [phone]  Fax: [phone]    Crisis Response Team - [facility]  RST Worker- Patsy- will follow-up with pt after discharge.  [phone]  Or 211  Fax: [phone]        NUMBERS TO CALL IN CRISIS    National Suicide Prevention Lifeline ([facility])  1-997.199.3408    Crisis Text Line ([place])  Text  HOME  to 727703    Mental Health Crisis Line ([place], [place])  [phone]    [facility] Mobile Crisis ([place], [place])   [phone]      If you are feeling very unsafe, call 911 or bring yourself to the Emergency Room        Counseling in [place]:  Michael Mejia           [phone]  [facility]    [facility]      [phone]  [facility]     [facility] (young kids)    [phone]   Monica Plata [facility] (older kids & adults)  [phone]   Jaquan Enrique [facility]      [phone]  [facility]       [phone]   Christiano Gomez MS, LP   Emili Caballero MA, LPC   Linus Thompson MS psychotherapist   Bambi Dowling MS, LPC   Alona Perla [facility], counselor   Princess Gomez [facility], counselor  Katty        [phone]  Rory Lynch, counseling  Dr Stormy Jaeger, psychiatry  Millie Cannon, counseling  Misbah Ceja, counseling  Bonnie Newman, counseling  Amberly Martinez, psychiatry   [facility]       [phone]   Mali Garcia MA, LMFT, RPFS   Eun Richard [facility], [facility]           [phone]  [facility]      [phone]   Princess Poppyon MS, [facility]          [phone]        Darby Jimenez MSW, LIC , C-RODRIGUEZ Elaine MSW, LGSW                                                    Darrell Leger LG      Yuliana Sarkararik MS, LPC   Arlin Leaders   [facility]                [phone]      Eileen Piotr PsyD     Jonelle Solomon PsyD, owner      Isa Judd PsyD    Karena Long MPAS, PACASSANDRA Echeverria PhD   Lizzette Malagon MSW, [facility]    [facility]       [phone]   [facility]   Jose Cervantes APRN, CNP,     Robin Maravilla MSW, LGSW (adolescents)   Jackie Grinnel APRN, CNP (Hib via telehealth; adolescents)   Lynn FENG, CNP (Hib via telehealth)   Varun Thakur MA, LPC, BCIA (Hib via telehealth)   Hyun Lopez [facility]   [facility] MSW, [facility]   Gem Taylor [facility]   Donte Sosa DNP, APRN, CNP   Martha Perez RN, BSN   Shivani Aparicio RN-BC, BSN (Hib via telehealth)  [facility]         [phone]   Kati Swenson, MSN, [facility]           [phone]   Reese Torres MA, LMFT   Johanny Wesley MS RN [facility] NP   Princess Hall, [facility]         [phone]  [facility]       [phone]   [facility]   Veena Brownlee (to be [facility])   Dewey Urbina (to be [facility])      Counseling in [place]:  [facility]       [phone]   mental health & CD counseling  Varun Apodaca       [phone]  [facility]       [phone]  Austyn Baker        [phone]   Mali Luciano  [facility]       [facility]              [phone]   can do weekends and evenings   DeLcornel Mejia, MSW, LICSW, LCSW, CCTP    Mino Elena MA, LMFT, [facility]  Connie Bailey       evenings, weekends  [phone]      Counseling in [place]:  [facility]         [phone]   Kati Swenson, MSN, PMHNP-BC   [place]  [facility]  [facility]      [phone]  [facility]       [phone]   Princess Clifton PMFT  [facility]       Angeles Brown       [phone]  [facility]    [address]     [facility]      [phone]  [facility]     Misbah Barker MS Ed, LMFT    [phone]    (kids) denotes providers who also see kids    Attend all scheduled appointments with your outpatient providers. Call at least 24 hours in advance if you need to reschedule an appointment to ensure continued access to your outpatient providers.     Major Treatments, Procedures and Findings:  You were provided with: a psychiatric assessment, assessed for medical stability, medication evaluation and/or management, group therapy, family therapy, individual therapy, CD evaluation/assessment, milieu management and medical interventions    Symptoms to Report: feeling more aggressive, increased confusion, losing more sleep, mood getting worse or thoughts of suicide    Early warning signs can include: increased depression or anxiety sleep disturbances increased thoughts or behaviors of suicide or self-harm  increased unusual thinking, such as paranoia or hearing voices        Resources:   Crisis Intervention: 465.656.3258 or 719-422-1780 (TTY: 271.286.7155).  Call anytime for help.  National [place] on Mental Illness (www.[place].felipe.org): 855.845.4938 or 359-955-4038.  Alcoholics Anonymous (www.alcoholics-anonymous.org): Check your phone book for your local chapter.  Suicide Awareness Voices of Education (SAVE) (www.save.org): 989-948-VCVB (4983)  National Suicide Prevention Line (www.mentalhealthmn.org): 230-223-SPRW (4873)  Mental Health Consumer/Survivor Network of [place] (www.mhcsn.net): 929.856.6533 or 122-038-8288  Mental Health Association of [place] (www.mentalhealth.org): 889.702.1825 or 452-616-2447  Self- Management and Recovery Training., SMART-- Toll free:  "904.140.7170  www.[employer]  Text 4 Life: txt \"LIFE\" to 59196 for immediate support and crisis intervention  Crisis text line: Text \"[place]\" to 771854. Free, confidential, 24/7.  Crisis Intervention: 208.827.1960 or 959-414-7447. Call anytime for help.     General Medication Instructions:   See your medication sheet(s) for instructions.   Take all medicines as directed.  Make no changes unless your doctor suggests them.   Go to all your doctor visits.  Be sure to have all your required lab tests. This way, your medicines can be refilled on time.  Do not use any drugs not prescribed by your doctor.  Avoid alcohol.    Advance Directives:   Scanned document on file with [facility]? No scanned doc  Is document scanned? No. Copy Requested.  Honoring Choices Your Rights Handout: Informed and given  Was more information offered? Pt declined    The Treatment team has appreciated the opportunity to work with you. If you have any questions or concerns about your recent admission, you can contact the unit which can receive your call 24 hours a day, 7 days a week. They will be able to get in touch with a Provider if needed. The unit number is [phone].    [facility] Area:  [facility], Crisis stabilization [facility]- [phone]  [facility] Crisis Line: 1-377.171.1583  [facility] For Family Peace: 544-2567  Sexual Assault Program [facility]: 149.152.2458 or 1-410.757.7806  [facility]attered Women's Program: 1-995.575.6850 Ext: 279       Calls answered Mon-Fri-8:00 am--4:30 pm    [place]:  [facility] for Family Peace: 6-916-124-0278  [facility] - 1-138.644.2611  [facility] first call for help: 1-703.662.2482  [facility] Crisis Center:  (383) 559-9098    [place] Area:  Warm Line: 1-284.510.9252       Calls answered Tuesday--Saturday 4:00 pm--10:00 pm  [facility] Crisis Line - 751.535.5405  [facility] Stabilization 452-761-8770    [place] Statewide:  [place] Crisis and Referral Services: " "9-137-076-2334  National Suicide Prevention Lifeline: 0-170-786-TALK (8320)   - frm4ztrf- Text \"Life\" to 09457  First Call for Help: 2-1-1  MOON Helpline- 7-762-WEXI-HELP   Crisis Text Line: Text  [place]  to 688346  "
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
## 2021-11-10 NOTE — PLAN OF CARE
"Face to face end of shift report communicated to oncoming shift.     Danielle Denney RN  11/10/2021  3:05 PM    Problem: Behavioral Health Plan of Care  Goal: Patient-Specific Goal (Individualization)  Description: Pt will be compliant with treatment team recommendations during hospitalization.   Pt will report adequate amounts of sleep on NOC shift (5-8 hours.)  Pt will participate in greater than 50% of daily groups.        Outcome: No Change  Note: Shift Summery:      Patient up on unit for breakfast. Patient reports \"I'm ready to go home, I'm just waiting to talk to the provider, I signed myself in voluntary so how does that work\"  discussed how the 12 hour intent works and patient agrees he would like to wait and speak to the provider regarding him discharging.  Patient is polite and cooperative with nursing assessment and cares.  Patient takes all medications as prescribed.  Patient denies SI at this time.  Patient endorses feeling hopeful on the med changes.      Patient denies current thoughts of SI and HI.  Denies AH and VH.    Patient attends group, spends time in lounge watching tv with peers and socializing appropriately.      Face to face start of shift report received from RN.  Patient in room at this time.            "

## 2021-11-10 NOTE — PLAN OF CARE
Pt is discharging at the recommendation of the treatment team. Pt is discharging to mom's work, to get ride home, transported by walking to [facility] at 4:30pm. Pt denies having any thoughts of hurting themself or anyone else. Pt has follow up with [facility] PCP. Discharge instructions, including; demographic sheet, psychiatric evaluation, discharge summary, and AVS were faxed to these next level of care providers.

## 2021-11-10 NOTE — PROGRESS NOTES
Discharge Note    Patient Discharged to home on 11/10/2021 4:14 PM via No transportation concerns accompanied by staff to exit hospital.  He is walking to meet his mother at the [employer] store near the hospital.     Patient informed of discharge instructions in AVS. patient verbalizes understanding and denies having any questions pertaining to AVS. Patient stable at time of discharge. Patient denies SI, HI, and thoughts of self harm at time of discharge. All personal belongings returned to patient. Discharge prescriptions sent to Sujatha Guillen via electronic communication. Psych evaluation, history and physical, AVS, and discharge summary faxed to next level of care.     Oz Anderson RN  11/10/2021  4:14 PM

## 2021-11-10 NOTE — PLAN OF CARE
Problem: Behavioral Health Plan of Care  Goal: Plan of Care Review  Recent Flowsheet Documentation  Taken 11/9/2021 1744 by Oz Anderson RN  Plan of Care Reviewed With: patient  Patient Agreement with Plan of Care: agrees  Goal: Patient-Specific Goal (Individualization)  Description: Pt will be compliant with treatment team recommendations during hospitalization.   Pt will report adequate amounts of sleep on NOC shift (5-8 hours.)  Pt will participate in greater than 50% of daily groups.  Outcome: Improving  Note: He has been sleeping in his room all evening (he generally works night shifts). He declined to eat supper. He denies any active suicidal ideations. He continues with some depression. He denies any medication side effects.     Goal: Develops/Participates in Therapeutic Henderson to Support Successful Transition  Intervention: Foster Therapeutic Henderson  Recent Flowsheet Documentation  Taken 11/9/2021 1744 by Oz Anderson RN  Trust Relationship/Rapport:    care explained    questions answered    questions encouraged    reassurance provided    thoughts/feelings acknowledged     Problem: Suicide Risk  Goal: Absence of Self-Harm  Outcome: Improving  Note: He denies any active suicidal ideations. He is agreeable to safe behavior on the unit.    Face to face end of shift report to be communicated to night shift RN.     Oz Anderson RN  11/9/2021  9:49 PM

## 2021-11-10 NOTE — PLAN OF CARE
Problem: Behavioral Health Plan of Care  Goal: Patient-Specific Goal (Individualization)  Description: Pt will be compliant with treatment team recommendations during hospitalization.   Pt will report adequate amounts of sleep on NOC shift (5-8 hours.)  Pt will participate in greater than 50% of daily groups.      Outcome: No Change     Problem: Suicide Risk  Goal: Absence of Self-Harm  Outcome: No Change     Problem: Suicidal Behavior  Goal: Suicidal Behavior is Absent or Managed  Outcome: No Change     Face to face shift report received from ALONZO Clinton. Rounding completed, pt observed laying in bed appeared to be sleeping.    Patient appeared to be sleeping for approximately 3.25 hours this shift, 10 additional hours on day/evenings.    Patient had no reported self harm this shift.      Face to face report will be communicated to oncoming RN.    Elvira Martinez RN  11/10/2021  6:25 AM

## 2022-01-10 ENCOUNTER — NURSE TRIAGE (OUTPATIENT)
Dept: FAMILY MEDICINE | Facility: OTHER | Age: 27
End: 2022-01-10
Payer: COMMERCIAL

## 2022-01-10 DIAGNOSIS — Z20.822 EXPOSURE TO 2019 NOVEL CORONAVIRUS: Primary | ICD-10-CM

## 2022-01-10 NOTE — TELEPHONE ENCOUNTER
"    Reason for Disposition    [1] CLOSE CONTACT COVID-19 EXPOSURE within last 14 days AND [2] NO symptoms    Answer Assessment - Initial Assessment Questions  1. COVID-19 EXPOSURE: \"Please describe how you were exposed to someone with a COVID-19 infection.\"      family  2. PLACE of CONTACT: \"Where were you when you were exposed to COVID-19?\" (e.g., home, school, medical waiting room; which city?)      home  3. TYPE of CONTACT: \"How much contact was there?\" (e.g., sitting next to, live in same house, work in same office, same building)      Visiting with  4. DURATION of CONTACT: \"How long were you in contact with the COVID-19 patient?\" (e.g., a few seconds, passed by person, a few minutes, 15 minutes or longer, live with the patient)      hours  5. MASK: \"Were you wearing a mask?\" \"Was the other person wearing a mask?\" Note: wearing a mask reduces the risk of an otherwise close contact.      no  6. DATE of CONTACT: \"When did you have contact with a COVID-19 patient?\" (e.g., how many days ago)      4 days ago  7. COMMUNITY SPREAD: \"Are there lots of cases of COVID-19 (community spread) where you live?\" (See public health department website, if unsure)        yes  8. SYMPTOMS: \"Do you have any symptoms?\" (e.g., fever, cough, breathing difficulty, loss of taste or smell)      no  9. PREGNANCY OR POSTPARTUM: \"Is there any chance you are pregnant?\" \"When was your last menstrual period?\" \"Did you deliver in the last 2 weeks?\"      no  10. HIGH RISK: \"Do you have any heart or lung problems?\" \"Do you have a weak immune system?\" (e.g., heart failure, COPD, asthma, HIV positive, chemotherapy, renal failure, diabetes mellitus, sickle cell anemia, obesity)        no  11. TRAVEL: \"Have you traveled out of the country recently?\" If Yes, ask: \"When and where?\" Also ask about out-of-state travel, since the [facility] has identified some high-risk cities for community spread in the . Note: Travel becomes less relevant if there is " widespread community transmission where the patient lives.        no    Protocols used: CORONAVIRUS (COVID-19) EXPOSURE-A- 8.25.2021

## 2022-01-22 ENCOUNTER — HEALTH MAINTENANCE LETTER (OUTPATIENT)
Age: 27
End: 2022-01-22

## 2022-09-04 ENCOUNTER — HEALTH MAINTENANCE LETTER (OUTPATIENT)
Age: 27
End: 2022-09-04

## 2022-12-08 ENCOUNTER — HOSPITAL ENCOUNTER (EMERGENCY)
Facility: HOSPITAL | Age: 27
Discharge: HOME | End: 2022-12-09
Attending: INTERNAL MEDICINE | Admitting: INTERNAL MEDICINE
Payer: COMMERCIAL

## 2022-12-08 VITALS
RESPIRATION RATE: 18 BRPM | TEMPERATURE: 97 F | SYSTOLIC BLOOD PRESSURE: 132 MMHG | HEART RATE: 84 BPM | DIASTOLIC BLOOD PRESSURE: 81 MMHG | OXYGEN SATURATION: 99 %

## 2022-12-08 DIAGNOSIS — K52.9 GASTROENTERITIS: ICD-10-CM

## 2022-12-08 DIAGNOSIS — R68.89 FLU-LIKE SYMPTOMS: ICD-10-CM

## 2022-12-08 PROCEDURE — 99282 EMERGENCY DEPT VISIT SF MDM: CPT | Mod: CS | Performed by: INTERNAL MEDICINE

## 2022-12-08 PROCEDURE — 87637 SARSCOV2&INF A&B&RSV AMP PRB: CPT | Performed by: INTERNAL MEDICINE

## 2022-12-08 PROCEDURE — C9803 HOPD COVID-19 SPEC COLLECT: HCPCS

## 2022-12-08 PROCEDURE — 99283 EMERGENCY DEPT VISIT LOW MDM: CPT | Mod: CS

## 2022-12-08 RX ORDER — ARIPIPRAZOLE 2 MG/1
TABLET ORAL
Status: ON HOLD | COMMUNITY
Start: 2022-11-14 | End: 2023-01-04

## 2022-12-08 RX ORDER — CLONIDINE HYDROCHLORIDE 0.1 MG/1
0.1 TABLET ORAL
Status: ON HOLD | COMMUNITY
Start: 2022-10-11 | End: 2023-01-04

## 2022-12-09 LAB
FLUAV RNA SPEC QL NAA+PROBE: NEGATIVE
FLUBV RNA RESP QL NAA+PROBE: NEGATIVE
RSV RNA SPEC NAA+PROBE: NEGATIVE
SARS-COV-2 RNA RESP QL NAA+PROBE: NEGATIVE

## 2022-12-09 PROCEDURE — 250N000013 HC RX MED GY IP 250 OP 250 PS 637: Performed by: INTERNAL MEDICINE

## 2022-12-09 PROCEDURE — 250N000011 HC RX IP 250 OP 636: Performed by: INTERNAL MEDICINE

## 2022-12-09 RX ORDER — MAGNESIUM HYDROXIDE/ALUMINUM HYDROXICE/SIMETHICONE 120; 1200; 1200 MG/30ML; MG/30ML; MG/30ML
30 SUSPENSION ORAL ONCE
Status: COMPLETED | OUTPATIENT
Start: 2022-12-09 | End: 2022-12-09

## 2022-12-09 RX ORDER — ONDANSETRON 4 MG/1
8 TABLET, ORALLY DISINTEGRATING ORAL ONCE
Status: COMPLETED | OUTPATIENT
Start: 2022-12-09 | End: 2022-12-09

## 2022-12-09 RX ADMIN — ONDANSETRON 8 MG: 4 TABLET, ORALLY DISINTEGRATING ORAL at 00:38

## 2022-12-09 RX ADMIN — ALUMINUM HYDROXIDE, MAGNESIUM HYDROXIDE, AND SIMETHICONE 30 ML: 200; 200; 20 SUSPENSION ORAL at 00:38

## 2022-12-09 ASSESSMENT — ENCOUNTER SYMPTOMS
FREQUENCY: 0
FEVER: 0
VOICE CHANGE: 0
SHORTNESS OF BREATH: 0
BACK PAIN: 0
FLANK PAIN: 0
DIARRHEA: 1
PALPITATIONS: 0
CHEST TIGHTNESS: 0
FLU SYMPTOMS: 1
NUMBNESS: 0
DIAPHORESIS: 0
HEADACHES: 0
COLOR CHANGE: 0
CONFUSION: 0
DIZZINESS: 0
NECK PAIN: 0
ABDOMINAL PAIN: 1
BLOOD IN STOOL: 0
VOMITING: 0
SLEEP DISTURBANCE: 0
WHEEZING: 0
COUGH: 1
DYSURIA: 0
MYALGIAS: 1
CHILLS: 0
LIGHT-HEADEDNESS: 0
NAUSEA: 1
ABDOMINAL DISTENTION: 0
WEAKNESS: 0
ANAL BLEEDING: 0

## 2022-12-09 NOTE — ED NOTES
Patient reports stomach discomfort is improved after Maalox and zofran. Patient provided written and verbal discharge instructions and patient verbalizes understanding. Patient left department ambulatory.

## 2022-12-09 NOTE — ED PROVIDER NOTES
History     Chief Complaint   Patient presents with     Flu Symptoms     X1 week     The history is provided by the patient.   Flu Symptoms  Presenting symptoms: cough, diarrhea, myalgias and nausea    Presenting symptoms: no fever, no headaches, no shortness of breath and no vomiting    Severity:  Moderate  Onset quality:  Gradual  Duration:  1 week  Progression:  Improving  Associated symptoms: no chills        Allergies:  No Known Allergies    Problem List:    Patient Active Problem List    Diagnosis Date Noted     Suicide attempt (H) 11/08/2021     Priority: Medium     Moderate episode of recurrent major depressive disorder (H) 09/14/2021     Priority: Medium     BPPV (benign paroxysmal positional vertigo) 04/10/2012     Priority: Medium     Formatting of this note might be different from the original.  IMO Update 10/11       Pes planovalgus 08/26/2010     Priority: Medium     Other acne 03/31/2010     Priority: Medium     Seborrheic dermatitis, unspecified 03/31/2010     Priority: Medium     Formatting of this note might be different from the original.  IMO Update 10/11       Bilateral knee pain 01/08/2009     Priority: Medium     Headache 11/11/2005     Priority: Medium     Formatting of this note might be different from the original.  Could be sinus allergies  IMO Update 10 2016          Past Medical History:    No past medical history on file.    Past Surgical History:    No past surgical history on file.    Family History:    No family history on file.    Social History:  Marital Status:  Single [1]        Medications:    propranolol (INDERAL) 10 MG tablet  venlafaxine (EFFEXOR-XR) 75 MG 24 hr capsule  ARIPiprazole (ABILIFY) 2 MG tablet  cloNIDine (CATAPRES) 0.1 MG tablet          Review of Systems   Constitutional: Negative for chills, diaphoresis and fever.   HENT: Negative for voice change.    Eyes: Negative for visual disturbance.   Respiratory: Positive for cough. Negative for chest tightness,  shortness of breath and wheezing.    Cardiovascular: Negative for chest pain, palpitations and leg swelling.   Gastrointestinal: Positive for abdominal pain, diarrhea and nausea. Negative for abdominal distention, anal bleeding, blood in stool and vomiting.   Genitourinary: Negative for decreased urine volume, dysuria, flank pain and frequency.   Musculoskeletal: Positive for myalgias. Negative for back pain, gait problem and neck pain.   Skin: Negative for color change, pallor and rash.   Neurological: Negative for dizziness, syncope, weakness, light-headedness, numbness and headaches.   Psychiatric/Behavioral: Negative for confusion, sleep disturbance and suicidal ideas.       Physical Exam   BP: 132/81  Pulse: 84  Temp: 97  F (36.1  C)  Resp: 18  SpO2: 99 %      Physical Exam  Vitals and nursing note reviewed.   Constitutional:       Appearance: He is well-developed and well-nourished.   HENT:      Head: Normocephalic and atraumatic.   Eyes:      Conjunctiva/sclera: Conjunctivae normal.      Pupils: Pupils are equal, round, and reactive to light.   Neck:      Thyroid: No thyromegaly.      Vascular: No JVD.      Trachea: No tracheal deviation.   Cardiovascular:      Rate and Rhythm: Normal rate and regular rhythm.      Pulses: Intact distal pulses.      Heart sounds: Normal heart sounds. No murmur heard.    No gallop.   Pulmonary:      Effort: Pulmonary effort is normal. No respiratory distress.      Breath sounds: Normal breath sounds. No stridor. No wheezing or rales.   Chest:      Chest wall: No tenderness.   Abdominal:      General: Bowel sounds are normal. There is no distension.      Palpations: Abdomen is soft. There is no mass.      Tenderness: There is no abdominal tenderness. There is no guarding or rebound.   Musculoskeletal:         General: No tenderness or edema. Normal range of motion.      Cervical back: Normal range of motion and neck supple.   Lymphadenopathy:      Cervical: No cervical  adenopathy.   Skin:     General: Skin is warm.      Coloration: Skin is not pale.      Findings: No erythema or rash.   Neurological:      Mental Status: He is alert and oriented to person, place, and time.   Psychiatric:         Behavior: Behavior normal.         ED Course                 Procedures                Results for orders placed or performed during the hospital encounter of 12/08/22 (from the past 24 hour(s))   Symptomatic Influenza A/B & SARS-CoV2 (COVID-19) Virus PCR Multiplex Nasopharyngeal    Specimen: Nasopharyngeal; Swab   Result Value Ref Range    Influenza A PCR Negative Negative    Influenza B PCR Negative Negative    RSV PCR Negative Negative    SARS CoV2 PCR Negative Negative    Narrative    Testing was performed using the Xpert Xpress CoV2/Flu/RSV Assay on the [employer] GeneXpert Instrument. This test should be ordered for the detection of SARS-CoV-2 and influenza viruses in individuals who meet clinical and/or epidemiological criteria. Test performance is unknown in asymptomatic patients. This test is for in vitro diagnostic use under the FDA EUA for laboratories certified under CLIA to perform high or moderate complexity testing. This test has not been FDA cleared or approved. A negative result does not rule out the presence of PCR inhibitors in the specimen or target RNA in concentration below the limit of detection for the assay. If only one viral target is positive but coinfection with multiple targets is suspected, the sample should be re-tested with another FDA cleared, approved, or authorized test, if coinfection would change clinical management. This test was validated by the [facility]. These laboratories are certified under the Clinical Laboratory Improvement Amendments of 1988 (CLIA-88) as qualified to perform high complexity laboratory testing.       Medications   ondansetron (ZOFRAN ODT) ODT tab 8 mg (8 mg Oral Given 12/9/22 0038)   alum & mag  hydroxide-simethicone (MAALOX) suspension 30 mL (30 mLs Oral Given 12/9/22 0038)       Assessments & Plan (with Medical Decision Making)   Flu like symptoms, epigastric pain , diarrhea  Symptoms improved after receiving Maalox   Oral hydration at home, follow-up with PCP  [facility]  I have reviewed the nursing notes.    I have reviewed the findings, diagnosis, plan and need for follow up with the patient.      Discharge Medication List as of 12/9/2022 12:54 AM          Final diagnoses:   Flu-like symptoms   Gastroenteritis       12/8/2022   [facility]     Mir Plummer MD  12/09/22 0614

## 2022-12-19 ENCOUNTER — HOSPITAL ENCOUNTER (EMERGENCY)
Facility: HOSPITAL | Age: 27
Discharge: ANOTHER HOSPITAL | End: 2022-12-19
Attending: FAMILY MEDICINE | Admitting: FAMILY MEDICINE
Payer: COMMERCIAL

## 2022-12-19 ENCOUNTER — APPOINTMENT (OUTPATIENT)
Dept: ULTRASOUND IMAGING | Facility: HOSPITAL | Age: 27
End: 2022-12-19
Attending: EMERGENCY MEDICINE
Payer: COMMERCIAL

## 2022-12-19 ENCOUNTER — TRANSFERRED RECORDS (OUTPATIENT)
Dept: HEALTH INFORMATION MANAGEMENT | Facility: OTHER | Age: 27
End: 2022-12-19

## 2022-12-19 VITALS
SYSTOLIC BLOOD PRESSURE: 102 MMHG | BODY MASS INDEX: 37.93 KG/M2 | TEMPERATURE: 98.2 F | HEIGHT: 72 IN | WEIGHT: 280 LBS | RESPIRATION RATE: 19 BRPM | HEART RATE: 90 BPM | OXYGEN SATURATION: 94 % | DIASTOLIC BLOOD PRESSURE: 74 MMHG

## 2022-12-19 DIAGNOSIS — T33.90XA FROSTBITE, INITIAL ENCOUNTER: ICD-10-CM

## 2022-12-19 DIAGNOSIS — T50.902A INTENTIONAL OVERDOSE, INITIAL ENCOUNTER (H): ICD-10-CM

## 2022-12-19 DIAGNOSIS — T68.XXXA HYPOTHERMIA, INITIAL ENCOUNTER: ICD-10-CM

## 2022-12-19 LAB
ALBUMIN SERPL BCG-MCNC: 4.3 G/DL (ref 3.5–5.2)
ALBUMIN UR-MCNC: 30 MG/DL
ALP SERPL-CCNC: 79 U/L (ref 40–129)
ALT SERPL W P-5'-P-CCNC: 90 U/L (ref 10–50)
AMPHETAMINES UR QL: NOT DETECTED
ANION GAP SERPL CALCULATED.3IONS-SCNC: 11 MMOL/L (ref 7–15)
APAP SERPL-MCNC: <5 UG/ML (ref 10–30)
APPEARANCE UR: CLEAR
AST SERPL W P-5'-P-CCNC: 31 U/L (ref 10–50)
BARBITURATES UR QL SCN: NOT DETECTED
BASE EXCESS BLDV CALC-SCNC: -0.2 MMOL/L (ref -7.7–1.9)
BASOPHILS # BLD AUTO: 0.1 10E3/UL (ref 0–0.2)
BASOPHILS NFR BLD AUTO: 0 %
BENZODIAZ UR QL SCN: NOT DETECTED
BILIRUB SERPL-MCNC: 0.4 MG/DL
BILIRUB UR QL STRIP: NEGATIVE
BUN SERPL-MCNC: 15.9 MG/DL (ref 6–20)
BUPRENORPHINE UR QL: NOT DETECTED
CALCIUM SERPL-MCNC: 9 MG/DL (ref 8.6–10)
CANNABINOIDS UR QL: NOT DETECTED
CHLORIDE SERPL-SCNC: 107 MMOL/L (ref 98–107)
CK SERPL-CCNC: 350 U/L (ref 39–308)
COCAINE UR QL SCN: NOT DETECTED
COLOR UR AUTO: YELLOW
CREAT SERPL-MCNC: 0.76 MG/DL (ref 0.67–1.17)
D-METHAMPHET UR QL: NOT DETECTED
DEPRECATED HCO3 PLAS-SCNC: 22 MMOL/L (ref 22–29)
EOSINOPHIL # BLD AUTO: 0 10E3/UL (ref 0–0.7)
EOSINOPHIL NFR BLD AUTO: 0 %
ERYTHROCYTE [DISTWIDTH] IN BLOOD BY AUTOMATED COUNT: 12.1 % (ref 10–15)
ETHANOL SERPL-MCNC: <0.01 G/DL
GFR SERPL CREATININE-BSD FRML MDRD: >90 ML/MIN/1.73M2
GLUCOSE SERPL-MCNC: 85 MG/DL (ref 70–99)
GLUCOSE UR STRIP-MCNC: NEGATIVE MG/DL
HCO3 BLDV-SCNC: 26 MMOL/L (ref 21–28)
HCT VFR BLD AUTO: 50.2 % (ref 40–53)
HGB BLD-MCNC: 17.4 G/DL (ref 13.3–17.7)
HGB UR QL STRIP: NEGATIVE
IMM GRANULOCYTES # BLD: 0.2 10E3/UL
IMM GRANULOCYTES NFR BLD: 1 %
KETONES UR STRIP-MCNC: NEGATIVE MG/DL
LACTATE SERPL-SCNC: 1.2 MMOL/L (ref 0.7–2)
LEUKOCYTE ESTERASE UR QL STRIP: NEGATIVE
LYMPHOCYTES # BLD AUTO: 1.4 10E3/UL (ref 0.8–5.3)
LYMPHOCYTES NFR BLD AUTO: 7 %
MCH RBC QN AUTO: 28.5 PG (ref 26.5–33)
MCHC RBC AUTO-ENTMCNC: 34.7 G/DL (ref 31.5–36.5)
MCV RBC AUTO: 82 FL (ref 78–100)
METHADONE UR QL SCN: NOT DETECTED
MONOCYTES # BLD AUTO: 1.7 10E3/UL (ref 0–1.3)
MONOCYTES NFR BLD AUTO: 8 %
MUCOUS THREADS #/AREA URNS LPF: PRESENT /LPF
NEUTROPHILS # BLD AUTO: 17.6 10E3/UL (ref 1.6–8.3)
NEUTROPHILS NFR BLD AUTO: 84 %
NITRATE UR QL: NEGATIVE
NRBC # BLD AUTO: 0 10E3/UL
NRBC BLD AUTO-RTO: 0 /100
O2/TOTAL GAS SETTING VFR VENT: 21 %
OPIATES UR QL SCN: NOT DETECTED
OXYCODONE UR QL SCN: NOT DETECTED
OXYHGB MFR BLDV: 86 % (ref 70–75)
PCO2 BLDV: 46 MM HG (ref 40–50)
PCP UR QL SCN: NOT DETECTED
PH BLDV: 7.36 [PH] (ref 7.32–7.43)
PH UR STRIP: 5.5 [PH] (ref 4.7–8)
PLATELET # BLD AUTO: 260 10E3/UL (ref 150–450)
PO2 BLDV: 52 MM HG (ref 25–47)
POTASSIUM SERPL-SCNC: 4.1 MMOL/L (ref 3.4–5.3)
PROPOXYPH UR QL: NOT DETECTED
PROT SERPL-MCNC: 7 G/DL (ref 6.4–8.3)
RBC # BLD AUTO: 6.1 10E6/UL (ref 4.4–5.9)
RBC URINE: 5 /HPF
SALICYLATES SERPL-MCNC: <0.5 MG/DL
SARS-COV-2 RNA RESP QL NAA+PROBE: NEGATIVE
SODIUM SERPL-SCNC: 140 MMOL/L (ref 136–145)
SP GR UR STRIP: 1.02 (ref 1–1.03)
SPERM #/AREA URNS HPF: PRESENT /HPF
SQUAMOUS EPITHELIAL: 0 /HPF
TRICYCLICS UR QL SCN: NOT DETECTED
TROPONIN T SERPL HS-MCNC: <6 NG/L
UROBILINOGEN UR STRIP-MCNC: NORMAL MG/DL
WBC # BLD AUTO: 21 10E3/UL (ref 4–11)
WBC URINE: 2 /HPF

## 2022-12-19 PROCEDURE — 93005 ELECTROCARDIOGRAM TRACING: CPT

## 2022-12-19 PROCEDURE — 93010 ELECTROCARDIOGRAM REPORT: CPT | Performed by: INTERNAL MEDICINE

## 2022-12-19 PROCEDURE — 96361 HYDRATE IV INFUSION ADD-ON: CPT

## 2022-12-19 PROCEDURE — 96374 THER/PROPH/DIAG INJ IV PUSH: CPT

## 2022-12-19 PROCEDURE — 36415 COLL VENOUS BLD VENIPUNCTURE: CPT | Performed by: FAMILY MEDICINE

## 2022-12-19 PROCEDURE — 80053 COMPREHEN METABOLIC PANEL: CPT | Performed by: FAMILY MEDICINE

## 2022-12-19 PROCEDURE — 80306 DRUG TEST PRSMV INSTRMNT: CPT | Performed by: FAMILY MEDICINE

## 2022-12-19 PROCEDURE — 258N000003 HC RX IP 258 OP 636: Performed by: FAMILY MEDICINE

## 2022-12-19 PROCEDURE — 84484 ASSAY OF TROPONIN QUANT: CPT | Performed by: FAMILY MEDICINE

## 2022-12-19 PROCEDURE — 83605 ASSAY OF LACTIC ACID: CPT | Performed by: FAMILY MEDICINE

## 2022-12-19 PROCEDURE — 85025 COMPLETE CBC W/AUTO DIFF WBC: CPT | Performed by: FAMILY MEDICINE

## 2022-12-19 PROCEDURE — 82077 ASSAY SPEC XCP UR&BREATH IA: CPT | Performed by: FAMILY MEDICINE

## 2022-12-19 PROCEDURE — 76882 US LMTD JT/FCL EVL NVASC XTR: CPT | Mod: 26 | Performed by: EMERGENCY MEDICINE

## 2022-12-19 PROCEDURE — U0003 INFECTIOUS AGENT DETECTION BY NUCLEIC ACID (DNA OR RNA); SEVERE ACUTE RESPIRATORY SYNDROME CORONAVIRUS 2 (SARS-COV-2) (CORONAVIRUS DISEASE [COVID-19]), AMPLIFIED PROBE TECHNIQUE, MAKING USE OF HIGH THROUGHPUT TECHNOLOGIES AS DESCRIBED BY CMS-2020-01-R: HCPCS | Performed by: EMERGENCY MEDICINE

## 2022-12-19 PROCEDURE — 76882 US LMTD JT/FCL EVL NVASC XTR: CPT | Mod: TC | Performed by: EMERGENCY MEDICINE

## 2022-12-19 PROCEDURE — 99285 EMERGENCY DEPT VISIT HI MDM: CPT | Mod: 25

## 2022-12-19 PROCEDURE — 81001 URINALYSIS AUTO W/SCOPE: CPT | Performed by: FAMILY MEDICINE

## 2022-12-19 PROCEDURE — C9803 HOPD COVID-19 SPEC COLLECT: HCPCS

## 2022-12-19 PROCEDURE — 82805 BLOOD GASES W/O2 SATURATION: CPT | Performed by: FAMILY MEDICINE

## 2022-12-19 PROCEDURE — 80143 DRUG ASSAY ACETAMINOPHEN: CPT | Performed by: FAMILY MEDICINE

## 2022-12-19 PROCEDURE — 99285 EMERGENCY DEPT VISIT HI MDM: CPT | Mod: 25 | Performed by: EMERGENCY MEDICINE

## 2022-12-19 PROCEDURE — 250N000011 HC RX IP 250 OP 636: Performed by: EMERGENCY MEDICINE

## 2022-12-19 PROCEDURE — 82550 ASSAY OF CK (CPK): CPT | Performed by: FAMILY MEDICINE

## 2022-12-19 PROCEDURE — 80179 DRUG ASSAY SALICYLATE: CPT | Performed by: FAMILY MEDICINE

## 2022-12-19 RX ORDER — ACETAMINOPHEN 10 MG/ML
1000 INJECTION, SOLUTION INTRAVENOUS ONCE
Status: COMPLETED | OUTPATIENT
Start: 2022-12-19 | End: 2022-12-19

## 2022-12-19 RX ORDER — SODIUM CHLORIDE 9 MG/ML
INJECTION, SOLUTION INTRAVENOUS CONTINUOUS
Status: DISCONTINUED | OUTPATIENT
Start: 2022-12-19 | End: 2022-12-19 | Stop reason: HOSPADM

## 2022-12-19 RX ADMIN — ACETAMINOPHEN 1000 MG: 10 INJECTION, SOLUTION INTRAVENOUS at 21:18

## 2022-12-19 RX ADMIN — SODIUM CHLORIDE 1000 ML: 9 INJECTION, SOLUTION INTRAVENOUS at 18:27

## 2022-12-19 RX ADMIN — SODIUM CHLORIDE: 9 INJECTION, SOLUTION INTRAVENOUS at 18:28

## 2022-12-19 ASSESSMENT — ACTIVITIES OF DAILY LIVING (ADL)
ADLS_ACUITY_SCORE: 37
ADLS_ACUITY_SCORE: 37

## 2022-12-19 NOTE — ED PROVIDER NOTES
History     Chief Complaint   Patient presents with     Drug Overdose     HPI  Jose Alexander is a 27 year old male patient brought in by ambulance after intentional drug overdose/suicidal attempt.  History collected from the patient with help of EMS at the bedside because the patient altered mental status.  Sound like the patient intentionally ingested 30 of his 0.1 mg clonidine and 30 of his 2 mgeszopiclone, and about 20-30 of his 150 mg Effexor last night around 10 PM and a suicidal attempt.  And he stayed in his cold car until he was found today by police.  Less responsive and very cold.  Thermometer was not reading his temp and was just saying low temp and they started IV warm fluid prior to arrival.  Patient denies any headache or visual changes.  Denies any chest pain or shortness of breath.  Denies any headache.  Denies any abdominal pain.  History is limited by the patient medical condition acuity and mental status change.      Allergies:  No Known Allergies    Problem List:    Patient Active Problem List    Diagnosis Date Noted     Strep pharyngitis 12/31/2022     Priority: Medium     Frostbite of both feet 12/20/2022     Priority: Medium     Suicide attempt (H) 11/08/2021     Priority: Medium     Suicidal overdose, subsequent encounter 11/08/2021     Priority: Medium     Moderate episode of recurrent major depressive disorder (H) 09/14/2021     Priority: Medium     Major depressive disorder, recurrent severe without psychotic features (H) 09/14/2021     Priority: Medium     Anxiety disorder 06/04/2019     Priority: Medium     BPPV (benign paroxysmal positional vertigo) 04/10/2012     Priority: Medium     Formatting of this note might be different from the original.  IMO Update 10/11       Pes planovalgus 08/26/2010     Priority: Medium     Other acne 03/31/2010     Priority: Medium     Seborrheic dermatitis, unspecified 03/31/2010     Priority: Medium     Formatting of this note might be different  from the original.  IMO Update 10/11       Bilateral knee pain 01/08/2009     Priority: Medium     Headache 11/11/2005     Priority: Medium     Formatting of this note might be different from the original.  Could be sinus allergies  IMO Update 10 2016          Past Medical History:    No past medical history on file.    Past Surgical History:    No past surgical history on file.    Family History:    No family history on file.    Social History:  Marital Status:  Single [1]        Medications:    amoxicillin (AMOXIL) 500 MG capsule  diclofenac (VOLTAREN) 1 % topical gel  DULoxetine (CYMBALTA) 30 MG capsule  gabapentin (NEURONTIN) 800 MG tablet  ibuprofen (ADVIL/MOTRIN) 400 MG tablet  petrolatum-zinc oxide (PHYTOPLEX) 57-17 % paste          Review of Systems   Unable to perform ROS: Acuity of condition   All other systems reviewed and are negative.      Physical Exam   BP: 114/86  Pulse: 82  Temp: (!) 92.9  F (33.8  C)  Resp: 20  Height: 182.9 cm (6')  Weight: 127 kg (280 lb)  SpO2: 93 %      Physical Exam  Constitutional:       General: He is not in acute distress.     Appearance: He is ill-appearing. He is not toxic-appearing or diaphoretic.   HENT:      Head: Normocephalic and atraumatic.      Nose: Nose normal. No congestion or rhinorrhea.   Eyes:      General: No scleral icterus.        Right eye: No discharge.         Left eye: No discharge.      Extraocular Movements: Extraocular movements intact.      Conjunctiva/sclera: Conjunctivae normal.      Pupils: Pupils are equal, round, and reactive to light.   Neck:      Vascular: No carotid bruit.   Cardiovascular:      Rate and Rhythm: Normal rate and regular rhythm.      Heart sounds: Normal heart sounds. No murmur heard.  Pulmonary:      Effort: Pulmonary effort is normal. No respiratory distress.      Breath sounds: Normal breath sounds. No stridor. No wheezing, rhonchi or rales.   Chest:      Chest wall: No tenderness.   Abdominal:      General: Bowel sounds  are normal. There is no distension.      Palpations: Abdomen is soft. There is no mass.      Tenderness: There is no abdominal tenderness. There is no right CVA tenderness, left CVA tenderness, guarding or rebound.      Hernia: No hernia is present.   Musculoskeletal:         General: No tenderness.      Cervical back: Normal range of motion and neck supple. No rigidity or tenderness.   Lymphadenopathy:      Cervical: No cervical adenopathy.   Skin:     Coloration: Skin is not jaundiced or pale.      Findings: No bruising, erythema, lesion or rash.   Neurological:      General: No focal deficit present.      Cranial Nerves: No cranial nerve deficit.      Sensory: No sensory deficit.      Motor: No weakness.      Comments: Very slow and  confused   Psychiatric:      Comments: Suicidal ideations         ED Course     Patient was seen and examined immediately on arrival.  Started on cardiac monitor.  EKG shows normal sinus rhythm.  No sign of acute ischemia or dysrhythmia.  Temperature was 92 rectally.  To IV was established and patient given 2 L normal saline bolus warmed fluid.  Also on a Demetrio hugger.  Hemodynamically stable.  Satting 96% on room air.  Poison control was contacted.   Poison control reports that the Eszopiclone peaks in two hours and can cause CNS depression. The clonidine peaks in three hours can cause CNS and respiratory depression, bradycardia, and hypotension. The venlafaxine can have late symptoms, up to 18 hours, can cause QT/QRS prolongation, seizures, tachycardia, hypoglycemia, and hypertension. Poison control recommends ordering and getting an EKG, salicylate, acetaminophen, comprehensive metabolic panel and liver function tests. Also recommend overnight hospitalization.     Lab results are pending.  Patient care transferred to Dr. Ramachandran at time of shift exchange in a stable condition for further management and disposition.      Mental Health Risk Assessment      PSS-3    Date and Time  Over the past 2 weeks have you felt down, depressed, or hopeless? Over the past 2 weeks have you had thoughts of killing yourself? Have you ever attempted to kill yourself? When did this last happen? User   12/19/22 1806 yes yes yes -- DC      C-SSRS (Rebecca)    Date and Time Q1 Wished to be Dead (Past Month) Q2 Suicidal Thoughts (Past Month) Q3 Suicidal Thought Method Q4 Suicidal Intent without Specific Plan Q5 Suicide Intent with Specific Plan Q6 Suicide Behavior (Lifetime) Within the Past 3 Months? RETIRED: Level of Risk per Screen Screening Not Complete User   12/19/22 1806 yes yes yes no yes yes -- -- -- DC                    ED Course as of 01/21/23 1043   Mon Dec 19, 2022   1925 Patient signed out to me. Found by police in car, effexor, Eszopiclone, and clonidine.  Hypothermic temp 92 on arrival rectal. Somnolent, confused but following commands. Demetrio oleary. Poison control says he is past peak for clonidine and Eszopiclone.  Venlafaxine risk of seizure, to monitor for 6 hours, then will need mental health placement.   2029 RN called me to bedside. Feet are warm but has purple discoloration of BL toes to anterior forefoot. Has decreased sensation.  DP pulses are faint.    2105 DP pulses present on ultrasound. Is regaining some color to R foot, L foot still with dark purple on dorsal anterior foot, L toes are red and shiny, swollen, is able to move them but sensation is absent except in R great toe. Does report severe pain in feet. He was accepted by Dr Rowland at [facility]   2111 Transport to arrive in 25 minutes     Procedures    Results for orders placed during the hospital encounter of 12/19/22    POC US SOFT TISSUE    Impression  BL DP pulses intact on color doppler            Critical Care time:  none               No results found for this or any previous visit (from the past 24 hour(s)).    Medications   0.9% sodium chloride BOLUS (0 mLs Intravenous Stopped 12/19/22 1921)     Followed by   0.9% sodium  chloride BOLUS (0 mLs Intravenous Stopped 12/19/22 1921)   acetaminophen (OFIRMEV) infusion 1,000 mg (0 mg Intravenous Stopped 12/19/22 2129)       Assessments & Plan (with Medical Decision Making)     I have reviewed the nursing notes.    I have reviewed the findings, diagnosis, plan and need for follow up with the patient.       Discharge Medication List as of 12/19/2022  9:50 PM          Final diagnoses:   Intentional overdose, initial encounter (H)   Frostbite, initial encounter   Hypothermia, initial encounter       12/19/2022   [facility]     Jose Daniel Owens MD  12/19/22 2111       Jose Daniel Owens MD  01/21/23 1044

## 2022-12-19 NOTE — ED TRIAGE NOTES
Spoke with Sabiha at poison control. Pt reports he took all of his pills last night at 10pm. Not sure on the amount of each medication. He took Clonidine, Venlafaxine, and Eszopiclone. Poison control reports that the Eszopiclone peaks in two hours and can cause CNS depression. The clonidine peaks in three hours can cause CNS and respiratory depression, bradycardia, and hypotension. The venlafaxine can have late symptoms, up to 18 hours, can cause QT/QRS prolongation, seizures, tachycardia, hypoglycemia, and hypertension. Poison control recommends ordering and getting an EKG, salicylate, acetaminophen, comprehensive metabolic panel and liver function tests. Also recommend overnight hospitalization. Updated Dr. Owens on poison control recommendations.

## 2022-12-20 ENCOUNTER — TRANSFERRED RECORDS (OUTPATIENT)
Dept: HEALTH INFORMATION MANAGEMENT | Facility: OTHER | Age: 27
End: 2022-12-20

## 2022-12-20 NOTE — ED NOTES
Patient's feet reddened on arrival. Now areas of purple, toes pale/cyanotic. Posterior tibial pulses present with doppler, unable to doppler pedal pulses. Updated Dr. Ramachandran who has been at bedside to assess.

## 2022-12-20 NOTE — ED NOTES
Poison control signing off but available if needed. Patient is transferring to [facility].

## 2022-12-20 NOTE — ED NOTES
Dr. Ramachandran has been at bedside again with ultrasound to feet. Dr. Ramachandran states by ultrasound patient does have pulses bilaterally. Purple areas remains on feet, areas that were pale before are regaining some pink/reddened color.

## 2022-12-20 NOTE — ED NOTES
[facility] transporting patient to [facility]. Report called to Feliz ROSADO at [facility]. Patient neuro status unchanged, lethargic but arouses to voice. VSS. Normal saline running at 125ml/hr on transport. Parents were here and are driving to [place] as well. Temp back to 98.2F upon transport. Feet continue to have purple discoloration and decreased sensation, some areas with increasing pink coloration.

## 2023-01-03 ENCOUNTER — HOSPITAL ENCOUNTER (INPATIENT)
Facility: HOSPITAL | Age: 28
LOS: 6 days | Discharge: HOME | End: 2023-01-09
Attending: STUDENT IN AN ORGANIZED HEALTH CARE EDUCATION/TRAINING PROGRAM | Admitting: STUDENT IN AN ORGANIZED HEALTH CARE EDUCATION/TRAINING PROGRAM
Payer: COMMERCIAL

## 2023-01-03 ENCOUNTER — TELEPHONE (OUTPATIENT)
Dept: BEHAVIORAL HEALTH | Facility: HOSPITAL | Age: 28
End: 2023-01-03

## 2023-01-03 DIAGNOSIS — T33.821A FROSTBITE OF BOTH FEET, INITIAL ENCOUNTER: ICD-10-CM

## 2023-01-03 DIAGNOSIS — F33.2 MAJOR DEPRESSIVE DISORDER, RECURRENT SEVERE WITHOUT PSYCHOTIC FEATURES (H): Primary | ICD-10-CM

## 2023-01-03 DIAGNOSIS — J02.0 STREP PHARYNGITIS: ICD-10-CM

## 2023-01-03 DIAGNOSIS — F41.1 GENERALIZED ANXIETY DISORDER: ICD-10-CM

## 2023-01-03 DIAGNOSIS — T33.822A FROSTBITE OF BOTH FEET, INITIAL ENCOUNTER: ICD-10-CM

## 2023-01-03 PROCEDURE — 124N000001 HC R&B MH

## 2023-01-03 PROCEDURE — 99221 1ST HOSP IP/OBS SF/LOW 40: CPT | Mod: AI | Performed by: NURSE PRACTITIONER

## 2023-01-03 RX ORDER — OLANZAPINE 10 MG/1
10 TABLET ORAL 3 TIMES DAILY PRN
Status: DISCONTINUED | OUTPATIENT
Start: 2023-01-03 | End: 2023-01-09 | Stop reason: HOSPADM

## 2023-01-03 RX ORDER — OLANZAPINE 10 MG/2ML
10 INJECTION, POWDER, FOR SOLUTION INTRAMUSCULAR 3 TIMES DAILY PRN
Status: DISCONTINUED | OUTPATIENT
Start: 2023-01-03 | End: 2023-01-09 | Stop reason: HOSPADM

## 2023-01-03 RX ORDER — AMOXICILLIN 250 MG
1 CAPSULE ORAL 2 TIMES DAILY PRN
Status: DISCONTINUED | OUTPATIENT
Start: 2023-01-03 | End: 2023-01-09 | Stop reason: HOSPADM

## 2023-01-03 RX ORDER — MAGNESIUM HYDROXIDE/ALUMINUM HYDROXICE/SIMETHICONE 120; 1200; 1200 MG/30ML; MG/30ML; MG/30ML
30 SUSPENSION ORAL EVERY 4 HOURS PRN
Status: DISCONTINUED | OUTPATIENT
Start: 2023-01-03 | End: 2023-01-09 | Stop reason: HOSPADM

## 2023-01-03 RX ORDER — ACETAMINOPHEN 325 MG/1
650 TABLET ORAL EVERY 4 HOURS PRN
Status: DISCONTINUED | OUTPATIENT
Start: 2023-01-03 | End: 2023-01-09 | Stop reason: HOSPADM

## 2023-01-03 RX ORDER — LANOLIN ALCOHOL/MO/W.PET/CERES
3 CREAM (GRAM) TOPICAL
Status: DISCONTINUED | OUTPATIENT
Start: 2023-01-03 | End: 2023-01-09 | Stop reason: HOSPADM

## 2023-01-03 RX ORDER — HYDROXYZINE HYDROCHLORIDE 25 MG/1
25 TABLET, FILM COATED ORAL EVERY 4 HOURS PRN
Status: DISCONTINUED | OUTPATIENT
Start: 2023-01-03 | End: 2023-01-09 | Stop reason: HOSPADM

## 2023-01-03 ASSESSMENT — ACTIVITIES OF DAILY LIVING (ADL)
ADLS_ACUITY_SCORE: 50
WALKING_OR_CLIMBING_STAIRS: OTHER (SEE COMMENTS)
CONCENTRATING,_REMEMBERING_OR_MAKING_DECISIONS_DIFFICULTY: NO
WALKING_OR_CLIMBING_STAIRS_DIFFICULTY: YES
TOILETING_ISSUES: NO
DRESSING/BATHING_DIFFICULTY: NO
EATING/SWALLOWING: EATING
CHANGE_IN_FUNCTIONAL_STATUS_SINCE_ONSET_OF_CURRENT_ILLNESS/INJURY: NO
ADLS_ACUITY_SCORE: 50
WEAR_GLASSES_OR_BLIND: NO
DOING_ERRANDS_INDEPENDENTLY_DIFFICULTY: NO
DIFFICULTY_EATING/SWALLOWING: YES
FALL_HISTORY_WITHIN_LAST_SIX_MONTHS: NO
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
## 2023-01-04 PROBLEM — T50.902D: Status: ACTIVE | Noted: 2021-11-08

## 2023-01-04 PROBLEM — J02.0 STREP PHARYNGITIS: Status: ACTIVE | Noted: 2022-12-31

## 2023-01-04 PROBLEM — T33.822A FROSTBITE OF BOTH FEET: Status: ACTIVE | Noted: 2022-12-20

## 2023-01-04 PROBLEM — F33.2 MAJOR DEPRESSIVE DISORDER, RECURRENT SEVERE WITHOUT PSYCHOTIC FEATURES (H): Status: ACTIVE | Noted: 2021-09-14

## 2023-01-04 PROBLEM — T33.821A FROSTBITE OF BOTH FEET: Status: ACTIVE | Noted: 2022-12-20

## 2023-01-04 PROBLEM — F41.9 ANXIETY DISORDER: Status: ACTIVE | Noted: 2019-06-04

## 2023-01-04 PROCEDURE — 99222 1ST HOSP IP/OBS MODERATE 55: CPT | Performed by: NURSE PRACTITIONER

## 2023-01-04 PROCEDURE — 250N000013 HC RX MED GY IP 250 OP 250 PS 637: Performed by: NURSE PRACTITIONER

## 2023-01-04 PROCEDURE — 124N000001 HC R&B MH

## 2023-01-04 RX ORDER — DULOXETIN HYDROCHLORIDE 30 MG/1
30 CAPSULE, DELAYED RELEASE ORAL 2 TIMES DAILY
Status: DISCONTINUED | OUTPATIENT
Start: 2023-01-04 | End: 2023-01-09 | Stop reason: HOSPADM

## 2023-01-04 RX ORDER — AMOXICILLIN 500 MG/1
500 CAPSULE ORAL EVERY 12 HOURS
Status: ON HOLD | COMMUNITY
Start: 2023-01-03 | End: 2023-01-09

## 2023-01-04 RX ORDER — DIPHENHYDRAMINE HYDROCHLORIDE AND LIDOCAINE HYDROCHLORIDE AND ALUMINUM HYDROXIDE AND MAGNESIUM HYDRO
10 KIT
Status: DISCONTINUED | OUTPATIENT
Start: 2023-01-04 | End: 2023-01-09 | Stop reason: HOSPADM

## 2023-01-04 RX ORDER — IBUPROFEN 400 MG/1
400 TABLET, FILM COATED ORAL EVERY 6 HOURS PRN
Status: DISCONTINUED | OUTPATIENT
Start: 2023-01-04 | End: 2023-01-09 | Stop reason: HOSPADM

## 2023-01-04 RX ORDER — DIPHENHYDRAMINE HYDROCHLORIDE AND LIDOCAINE HYDROCHLORIDE AND ALUMINUM HYDROXIDE AND MAGNESIUM HYDRO
10 KIT 4 TIMES DAILY
Status: ON HOLD | COMMUNITY
Start: 2023-01-03 | End: 2023-01-09

## 2023-01-04 RX ORDER — DULOXETIN HYDROCHLORIDE 30 MG/1
30 CAPSULE, DELAYED RELEASE ORAL 2 TIMES DAILY
Status: ON HOLD | COMMUNITY
Start: 2023-01-03 | End: 2023-01-09

## 2023-01-04 RX ORDER — GABAPENTIN 300 MG/1
2 CAPSULE ORAL 3 TIMES DAILY
Status: ON HOLD | COMMUNITY
Start: 2023-01-03 | End: 2023-01-09

## 2023-01-04 RX ORDER — ZINC OXIDE 20 %
OINTMENT (GRAM) TOPICAL 2 TIMES DAILY
Status: DISCONTINUED | OUTPATIENT
Start: 2023-01-04 | End: 2023-01-05

## 2023-01-04 RX ORDER — IBUPROFEN 400 MG/1
400 TABLET, FILM COATED ORAL EVERY 6 HOURS PRN
COMMUNITY
Start: 2023-01-03

## 2023-01-04 RX ORDER — VENLAFAXINE HYDROCHLORIDE 37.5 MG/1
37.5 CAPSULE, EXTENDED RELEASE ORAL DAILY
Status: ON HOLD | COMMUNITY
Start: 2022-12-30 | End: 2023-01-09

## 2023-01-04 RX ORDER — GABAPENTIN 300 MG/1
600 CAPSULE ORAL 3 TIMES DAILY
Status: DISCONTINUED | OUTPATIENT
Start: 2023-01-04 | End: 2023-01-08

## 2023-01-04 RX ORDER — AMOXICILLIN 500 MG/1
500 CAPSULE ORAL 2 TIMES DAILY
Status: DISCONTINUED | OUTPATIENT
Start: 2023-01-04 | End: 2023-01-09 | Stop reason: HOSPADM

## 2023-01-04 RX ADMIN — DULOXETINE HYDROCHLORIDE 30 MG: 30 CAPSULE, DELAYED RELEASE ORAL at 12:50

## 2023-01-04 RX ADMIN — IBUPROFEN 400 MG: 400 TABLET ORAL at 20:22

## 2023-01-04 RX ADMIN — DICLOFENAC SODIUM 4 G: 10 GEL TOPICAL at 16:45

## 2023-01-04 RX ADMIN — DICLOFENAC SODIUM 4 G: 10 GEL TOPICAL at 20:23

## 2023-01-04 RX ADMIN — GABAPENTIN 600 MG: 300 CAPSULE ORAL at 13:23

## 2023-01-04 RX ADMIN — ZINC OXIDE: 200 OINTMENT TOPICAL at 19:04

## 2023-01-04 RX ADMIN — GABAPENTIN 600 MG: 300 CAPSULE ORAL at 20:22

## 2023-01-04 RX ADMIN — AMOXICILLIN 500 MG: 500 CAPSULE ORAL at 13:23

## 2023-01-04 RX ADMIN — ACETAMINOPHEN 650 MG: 325 TABLET, FILM COATED ORAL at 03:16

## 2023-01-04 RX ADMIN — ACETAMINOPHEN 650 MG: 325 TABLET, FILM COATED ORAL at 20:22

## 2023-01-04 RX ADMIN — DIPHENHYDRAMINE HYDROCHLORIDE AND LIDOCAINE HYDROCHLORIDE AND ALUMINUM HYDROXIDE AND MAGNESIUM HYDRO 10 ML: KIT at 16:45

## 2023-01-04 RX ADMIN — DICLOFENAC SODIUM 4 G: 10 GEL TOPICAL at 12:50

## 2023-01-04 RX ADMIN — HYDROXYZINE HYDROCHLORIDE 25 MG: 25 TABLET ORAL at 03:16

## 2023-01-04 RX ADMIN — AMOXICILLIN 500 MG: 500 CAPSULE ORAL at 20:22

## 2023-01-04 RX ADMIN — DULOXETINE HYDROCHLORIDE 30 MG: 30 CAPSULE, DELAYED RELEASE ORAL at 20:22

## 2023-01-04 ASSESSMENT — ACTIVITIES OF DAILY LIVING (ADL)
ADLS_ACUITY_SCORE: 50
DRESS: SCRUBS (BEHAVIORAL HEALTH);INDEPENDENT
ADLS_ACUITY_SCORE: 50
LAUNDRY: UNABLE TO COMPLETE
ADLS_ACUITY_SCORE: 50
ADLS_ACUITY_SCORE: 50
ORAL_HYGIENE: INDEPENDENT
ADLS_ACUITY_SCORE: 50
HYGIENE/GROOMING: INDEPENDENT
ADLS_ACUITY_SCORE: 50

## 2023-01-04 NOTE — DISCHARGE INSTRUCTIONS
Behavioral Discharge Planning and Instructions    Summary: You were admitted on 1/3/2023  due to Suicide Attempt.  You were treated by *** and discharged on ***/***/*** from *** to {AVS D/C Locations:710646}    Main Diagnosis: ***    Health Care Follow-up:     [facility] Establish care meet and homecarlie  Hubert Willard- 1/23/23  @ 2:05 pm  [address] [id number] [phone]    [facility]- Follow-up   Rachna Juarez- 2/1/23 @ 2:05pm  [address], [place] [address] [phone]    [facility]  Lydia Reeves - therapist  [address] [place]  [address]  Phone - [phone]  Fax - [phone]    [facility] - Karena Long  [address] [place] [address] [address] [place] [address]  Phone: [phone]  Fax: [phone]      [facility]- PHP  *Check into admitting on 1st floor before the first appointment   Intake appointment Monday, January 16 @ 10:30  5th Floor Room [address]  [address] [address]  Phone: [phone] ext. [phone]  Fax: [phone]          Attend all scheduled appointments with your outpatient providers. Call at least 24 hours in advance if you need to reschedule an appointment to ensure continued access to your outpatient providers.     Major Treatments, Procedures and Findings:  You were provided with: a psychiatric assessment, assessed for medical stability, medication evaluation and/or management, group therapy, milieu management, and medical interventions    Symptoms to Report: feeling more aggressive, increased confusion, losing more sleep, mood getting worse, or thoughts of suicide    Early warning signs can include: increased depression or anxiety sleep disturbances increased thoughts or behaviors of suicide or self-harm  increased unusual thinking, such as paranoia or hearing voices    Safety and Wellness:  Take all medicines as directed.  Make no changes unless your doctor suggests  "them.      Follow treatment recommendations.  Refrain from alcohol and non-prescribed drugs.  If there is a concern for safety, call 911.    Resources:   Crisis Intervention: 523.439.8398 or 714-284-9641 (TTY: 999.260.6744).  Call anytime for help.  National [place] on Mental Illness (www.[place].felipe.org): 455.751.1390 or 183-024-7771.  [place] Association for Children's Mental Health (www.macmh.org): 818.494.6124.  Suicide Awareness Voices of Education (SAVE) (www.save.org): 384-602-TMWJ (4180)  National Suicide Prevention Line (www.mentalhealthmn.org): 084-785-CCAK (8582)  Mental Health Consumer/Survivor Network of [place] (www.mhcsn.net): 699.148.4581 or 956-256-8264  Mental Health Association of [place] (www.mentalhealth.org): 946.199.1763 or 641-839-7233  Self- Management and Recovery Training., SMART-- Toll free: 418.442.3472  www.[place]  Text 4 Life: txt \"LIFE\" to 32625 for immediate support and crisis intervention  Crisis text line: Text \"[place]\" to 395852. Free, confidential, 24/7.  Crisis Intervention: 335.192.9618 or 544-540-6848. Call anytime for help.     General Medication Instructions:   See your medication sheet(s) for instructions.   Take all medicines as directed.  Make no changes unless your doctor suggests them.   Go to all your doctor visits.  Be sure to have all your required lab tests. This way, your medicines can be refilled on time.  Do not use any drugs not prescribed by your doctor.  Avoid alcohol.    Advance Directives:   Scanned document on file with [place]? No scanned doc  Is document scanned? Pt states no documents  Honoring Choices Your Rights Handout: Informed and given  Was more information offered? Pt declined    The Treatment team has appreciated the opportunity to work with you. If you have any questions or concerns about your recent admission, you can contact the unit which can receive your call 24 hours a day, 7 days a week. They will be able to get in touch with a Provider if needed. " The unit number is [phone] .

## 2023-01-04 NOTE — PLAN OF CARE
Problem: Adult Behavioral Health Plan of Care  Goal: Patient-Specific Goal (Individualization)  Description: Patient will sleep 6 to 8 hours per night  Patient will eat at least 50% of meals  Patient will attend at least 50% of groups  Patient will comply with recommendations of treatment team  Patient will remain medication compliant  Outcome: Progressing     Problem: Suicide Risk  Goal: Absence of Self-Harm  Description: Pt will be free of self harm while on unit.   Outcome: Progressing     Face to face shift report received from ALONZO Marmolejo. Rounding completed, pt observed in the lounge.    Patient ate breakfast in the lounge and ate 100%. Patient attended morning group, then showered.     Met with patient in his room after his shower. He states his depression is a little better 3/10, denies anxiety, SI/HI & hallucinations. Patient feels as if he is dealing with a lot of shame and negative self talk. He feels that working on this would help, however he doesn't want to fully open up in group because he doesn't want peers to know that he is a sex offender. Offered to find patient information on those two topics and he was tankful. Co-Nurse KAYLEN agreed to find information for patient.     Patient ate lunch in the lounge and ate 100%. Met with patient and Medical Nurse Practitioner. Patient's left foot  swollen, right foot has areas of missing skin that is red and healing, no signs of infection noted.  Spoke with staff and they are going to meet with patient to get him some footwear for on the unit.     Face to face report will be communicated to oncpatsy ROSADO.    Elvira Martinez RN  1/4/2023  2:48 PM

## 2023-01-04 NOTE — PLAN OF CARE
Problem: Adult Behavioral Health Plan of Care  Goal: Patient-Specific Goal (Individualization)  Description: Patient will sleep 6 to 8 hours per night  Patient will eat at least 50% of meals  Patient will attend at least 50% of groups  Patient will comply with recommendations of treatment team  Patient will remain medication compliant    Outcome: Progressing     Problem: Suicide Risk  Goal: Absence of Self-Harm  Description: Pt will be free of self harm while on unit.   Outcome: Progressing     Pt denies SI/HI, AH/VH, anxiety, depression. Pt rates pain 7/10 in both feet due to healing frostbite. Pt gait is steady bit slow due to frostbite. Pt is medication compliant and VSS. Pt has a flat affect. Pt is calm and alert. Pt is using appropriate behavior with staff and peers.      ADMISSION NOTE    Reason for admission SA overdose on medication   Safety concerns Pt is a level 4 SO.   Risk for or history of violence None  Full skin assessment: completed, bruising on lower abdomin and upper thighs. Healing frostbite on both feet.      Patient arrived on unit from [facility] accompanied by EMS on 1/3/2023  6:41 PM. Status on arrival: calm and appropriate during admission.   /85   Pulse 101   Temp 96.8  F (36  C) (Temporal)   Resp 18   Ht 1.829 m (6')   Wt 132.8 kg (292 lb 12.8 oz)   SpO2 94%   BMI 39.71 kg/m    Patient given tour of unit and Welcome to  unit papers given to patient, wanding completed, belongings inventoried, and admission assessment completed. Patient's legal status on arrival is Voluntary . Appropriate legal rights discussed with and copy given to patient. Patient Bill of Rights discussed with and copy given to patient. Patient denies SI, HI, and thoughts of self harm and contracts for safety while on unit.      Rocio Jewell RN  1/3/2023

## 2023-01-04 NOTE — PROGRESS NOTES
01/03/23 1940   Patient Belongings   Did you bring any home meds/supplements to the hospital?  Yes   Disposition of meds  Sent to security/pharmacy per site process   Patient Belongings locker;sent to security per site process   Patient Belongings Put in Hospital Secure Location (Security or Locker, etc.) cash/credit card;clothing;wallet;suitcase;shoes   Belongings Search Yes   Clothing Search Yes   Second Staff Minnie Riley suit case, tan shoes, owl underwear, champion sweatpants, black and white t-shirt, grey jacket, toothpaste, skin cream, deck of cards, selsun blue, deoderant, exercise band, blue sweatshirt, 4 pair of grey sweatpants, looney tune underwear, black underwear, orange underwear, flannel pj pants, 2 grey t shirts, blue t-shirt, green t-shirt, white t-shirt, set of Micah Potter books     List items sent to safe: bluetooth ear buds, brown wallet, [place] Drivers License, 2 insurance cards, [employer] one card, library card, [employer] card, [employer] visa card, covid vaccine card.    All other belongings put in assigned cubby in belongings room.       I have reviewed my belongings list on admission and verify that it is correct.     Patient signature_______________________________    Second staff witness (if patient unable to sign) ______________________________       I have received all my belongings at discharge.    Patient signature________________________________    Tanya  1/3/2023  7:47 PM
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
## 2023-01-04 NOTE — CARE PLAN
Prior to Admission Medication Reconciliation:     Medications added:   [] None  [x] As listed below: all meds patient was discharged on from [facility]:    effexor xr 37.5 mg- restarted at a lesser dose 12/25    Phenol    Ibuprofen 400 mg    Gabapentin 300 mg    Magic mouthwash    cymbalta    voltaren    amoxil    Medications deleted:   [] None  [x] As listed below:    effexor xr 300 mg.     Medications marked for review/removal by attending:  [x] None  [] As listed below:    Changes made to existing medications:   [x] None  [] Updated time of day, strengths and frequencies to most current.     Pertinent notes/medications patient takes different than prescribed:     Pt over-dosed on clonidine, effexor and lunesta- noted 12/19 at [facility].      Patient confirmed all med changes made at [facility].       Amoxil- initiated on 12/31/22, pt to receive 20 doses total. Took 6 doses at [facility]. Discharged with instructions to take 14 more doses.       Prior to admission at [facility] patient was on the following psych meds which were not continued at discharge:    abilify 5 mg daily    lunesta 2 mg at bedtime     effexor  mg- 300 mg daily     Clonidine 0.1 mg at bedtime prn     Propranolol 10 mg BID PRN anxiety    Last times/dates taken verified with patient:  [x] Yes- completed myself: per admin times at [facility]   [] Nurse completed, no changes made (double checked entries)  [] Unable to review with patient at this time:    Allergy review:    []Did not review: reviewed by nursing  [x]Allergies reviewed and updated if needed.     Medication reconciliation sources:   [x]Patient  []Patient family member/emergency contact: **  [][facility] Report Review  [x]Epic Chart Review  [x]Care Everywhere review  []Pharmacy med list/phone call: **  []Fill dates reflect compliancy. No concerns.  []Fill dates do not reflect compliancy on the following medications:   [x]Outside meds dispense report:   []HomeCare medlist, Nursing home or  Assisted Living MAR:  [x]Behavioral Health Provider: Justino Humphrey    Sees Karena Long- last seen 11/30/22    abilify 5 mg daily     lunesta 2 mg at bedtime     effexor xr 300 mg daily     clonidine 0.1 mg at bedtime prn sleep    Propranolol 10 mg BID PRN anxiety  []Other: **    Pharmacy desired at discharge:    Is patient on coumadin?   [x]No  []Yes      Fill dates and reported compliancy:  [x] Compliant: fill dates reflect reported compliancy.   [] Not applicable. Patient is not taking any prescribed maintenance medications at this time.   [] Fill dates do not coincide with compliancy, but reports compliancy.    [] Fill dates reflect compliancy, but reports non-compliancy.   [] Cannot assess at this time.     Historian accuracy:  [x] Excellent- alert and oriented, understands why meds were prescribed and how to take, able to answer specifics  [] Good- alert and oriented, understands why meds were prescribed and how to take, some confusion   [] Fair- alert and oriented, doesn't know medications without list, cannot answer specifics about medications, but has a decent process for which to take at home  [] Poor- does not know medications, may not have a process to take at home, may be cognitively unable to review at this time  []Medication management done by family member or facility, no concerns about historian accuracy.   [] Cannot assess at this time.     Medication Management:  [x] Manages meds independently  [] Family member/ other party manages meds/assists:  [] Meds managed by staff at facility  [] Meds set up by home care, family/other party helps administer  [] Meds set up by home care, self administers  [] Cannot assess at this time.     Other medications aside from PTA:  [x] Denies taking any other medications aside from those listed in PTA meds, this includes over-the-counter vitamins, supplements and analgesics.   [] Unable to confirm at this time.     Tatiana Kelly on 1/4/2023 at 10:29 AM     Notifying  appropriate party of changes/additions/discrepancies:  [x]No pertinent changes made, notification not necessary.   [] Notified attending provider via text page/phone call/sticky note or other:  [] Notified other:  [] Medications have not been reconciled by a provider yet, notification not necessary  [] Pt is not admitted to floor yet or patient is boarding, PTA meds completed before admission.     Medications Prior to Admission   Medication Sig Dispense Refill Last Dose     amoxicillin (AMOXIL) 500 MG capsule Take 500 mg by mouth every 12 hours For 14 doses.   1/3/2023 at 0819 [facility]     diclofenac (VOLTAREN) 1 % topical gel Apply 4 g topically 4 times daily -apply gel to both feet, avoiding open area and rub into skin gently; apply to entire joint.   1/3/2023 at 0819 [facility]     DULoxetine (CYMBALTA) 30 MG capsule Take 30 mg by mouth 2 times daily   1/3/2023 at 0819 [facility]     gabapentin (NEURONTIN) 300 MG capsule Take 2 capsules by mouth 3 times daily   1/2/2023 at 0846 [facility]     ibuprofen (ADVIL/MOTRIN) 400 MG tablet Take 400 mg by mouth every 6 hours as needed -administer with food.   1/3/2023 at 0930 [facility]     magic mouthwash suspension, diphenhydrAMINE, lidocaine, aluminum-magnesium & simethicone, (FIRST-MOUTHWASH BLM) compounding kit Swish and spit 10 mLs in mouth 4 times daily -before meals for 7 days.   1/3/2023 at 0819 [facility]     phenol (CHLORASEPTIC) 1.4 % spray Take by mouth every 2 hours as needed for sore throat   Unknown     venlafaxine (EFFEXOR XR) 37.5 MG 24 hr capsule Take 37.5 mg by mouth daily   1/3/2023 at 0819 [facility]

## 2023-01-04 NOTE — TELEPHONE ENCOUNTER
Intake received a call from Oz at [facility] to inform the team that this Pt will be admitted to [address]/[facility].     Writer completed indicia, sent for benefits, reviewed that the hospital account was set up and beh guarantor was connected to account.     Pt added to intake admit board.     Admission complete.
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
## 2023-01-04 NOTE — PLAN OF CARE
Report received from outgoing staff.    Problem: Adult Behavioral Health Plan of Care  Goal: Patient-Specific Goal (Individualization)  Description: Patient will sleep 6 to 8 hours per night  Patient will eat at least 50% of meals  Patient will attend at least 50% of groups  Patient will comply with recommendations of treatment team  Patient will remain medication compliant  Outcome: Not Progressing     Problem: Suicide Risk  Goal: Absence of Self-Harm  Description: Pt will be free of self harm while on unit.   Outcome: Not Progressing    Rounds completed. Safety checks completed every 15 minutes throughout the night. Pt noted to be resting with eyes closed and easy resp. for 3.5 hours. No suicidal gestures or comments noted.    0316 - Pt request something for pain in bilateral feet 7/10. Pt given Tylenol along with Atarax for anxiety. Pt reports at this that he had been resting well up until about 0230. Pt also reports that he is supposed to be putting lotion on his feet and a gel. It is noted in his discharge instructions that he is to be using Voltaren gel QID to bilateral feet. It was also noted in the chart that he had been using Silvadene but that appears to have been discontinued. Note put in for provider.    0530 - Pt reports that the Tylenol is ineffective for the foot pain and would really like to discuss  Different pain management plan with the provider. Pt is agreeable to waiting to visit with the provider when seen today.     Face to face end of shift report to be communicated to oncoming RN.

## 2023-01-04 NOTE — H&P
[facility]    History and Physical  Medical Services       Date of Admission:  1/3/2023  Date of Service (when I saw the patient): 01/04/23    Assessment & Plan     Active Problems:    Strep pharyngitis- pt tested positive on 12/31. He was started on Amoxicillin on 1/3. Amoxicillin ordered. Magic mouthwash ordered. Chloraseptic spray as needed. He reports his throat is feeling better.     Frost bite bilateral feet- pt was transported to [place] and admitted. His frostbite continued to improve and he was medically cleared for placement. Left foot looks pretty much all healed. Some dryness, peeling and some mild edema noted but no erythema. R foot shiny, red, peeling, to toes and foot. He reports he is still experiencing some pain especially when bearing weight. Will see about getting him some shoes he can wear on the unit. Central supply does not have the phytoplex lotion that was recommended at discharge from his inpatient stay. Wound care consult order placed and talked with them today and they will see him tomorrow for further recommendations. Voltaren gel ordered. Zinc oxide ointment ordered to replace phytoplex until wound care sees him. Can use shower chair.       Code Status: Full Code    Gem Stafford CNP    Primary Care Physician   Physician No Ref-Primary    Chief Complaint   Psych evaluation     History is obtained from the patient and medical chart     History of Present Illness   Jose Alexander is a 27 year old male patient brought in by ambulance after intentional drug overdose/suicidal attempt.  History collected from the patient with help of EMS at the bedside because the patient altered mental status.  Sound like the patient intentionally ingested 30 of his 0.1 mg clonidine and 30 of his 2 mgeszopiclone, and about 20-30 of his 150 mg Effexor last night around 10 PM and a suicidal attempt.  And he stayed in his cold car until he was found today by police.  Less responsive and very cold.   Thermometer was not reading his temp and was just saying low temp and they started IV warm fluid prior to arrival.  Patient denies any headache or visual changes.  Denies any chest pain or shortness of breath.  Denies any headache.  Denies any abdominal pain.  History is limited by the patient medical condition acuity and mental status change.    Pt was transferred to [facility] in [place] where he was inpatient from 12/19- 01/03 and medically [facility]       Past Medical History    I have reviewed this patient's medical history and updated it with pertinent information if needed.   No past medical history on file.    Past Surgical History   I have reviewed this patient's surgical history and updated it with pertinent information if needed.  No past surgical history on file.    Prior to Admission Medications   Prior to Admission Medications   Prescriptions Last Dose Informant Patient Reported? Taking?   DULoxetine (CYMBALTA) 30 MG capsule 1/3/2023 at 0819 [facility]  Yes Yes   Sig: Take 30 mg by mouth 2 times daily   amoxicillin (AMOXIL) 500 MG capsule 1/3/2023 at 0819 [facility]  Yes Yes   Sig: Take 500 mg by mouth every 12 hours For 14 doses.   diclofenac (VOLTAREN) 1 % topical gel 1/3/2023 at [address] [facility]  Yes Yes   Sig: Apply 4 g topically 4 times daily -apply gel to both feet, avoiding open area and rub into skin gently; apply to entire joint.   gabapentin (NEURONTIN) 300 MG capsule 1/2/2023 at 0[address] [facility]  Yes Yes   Sig: Take 2 capsules by mouth 3 times daily   ibuprofen (ADVIL/MOTRIN) 400 MG tablet 1/3/2023 at 09[address] [facility]  Yes Yes   Sig: Take 400 mg by mouth every 6 hours as needed -administer with food.   magic mouthwash suspension, diphenhydrAMINE, lidocaine, aluminum-magnesium & simethicone, (FIRST-MOUTHWASH BLM) compounding kit 1/3/2023 at [address] [facility]  Yes Yes   Sig: Swish and spit 10 mLs in mouth 4 times daily -before meals for 7 days.   phenol (CHLORASEPTIC) 1.4 % spray Unknown   Yes Yes   Sig: Take by mouth every 2 hours as needed for sore throat   venlafaxine (EFFEXOR XR) 37.5 MG 24 hr capsule 1/3/2023 at 0819 [facility]  Yes Yes   Sig: Take 37.5 mg by mouth daily      Facility-Administered Medications: None     Allergies   No Known Allergies    Social History   I have reviewed this patient's social history and updated it with pertinent information if needed. Jose ANISA Alexander      Family History   I have reviewed this patient's family history and updated it with pertinent information if needed.   No family history on file.    Review of Systems   CONSTITUTIONAL:  negative  EYES:  negative  HEENT:  Negative except strep throat.  RESPIRATORY:  negative  CARDIOVASCULAR:  negative  GASTROINTESTINAL:  negative  GENITOURINARY:  negative  INTEGUMENT/BREAST:  negative  HEMATOLOGIC/LYMPHATIC:  negative  ALLERGIC/IMMUNOLOGIC:  negative  ENDOCRINE:  negative  MUSCULOSKELETAL:  Negative except pain to bilateral feet.   NEUROLOGICAL:  negative    Physical Exam   Temp: 97.8  F (36.6  C) Temp src: Temporal BP: 127/82 Pulse: 93   Resp: 14 SpO2: 95 % O2 Device: None (Room air)    Vital Signs with Ranges  Temp:  [96.8  F (36  C)-97.8  F (36.6  C)] 97.8  F (36.6  C)  Pulse:  [] 93  Resp:  [14-18] 14  BP: (127-154)/(82-85) 127/82  SpO2:  [94 %-95 %] 95 %  292 lbs 12.8 oz    Constitutional: awake, alert, cooperative, no apparent distress, and appears stated age, vitals stable   Eyes: Lids and lashes normal, pupils equal, round and reactive to light, extra ocular muscles intact, sclera clear, conjunctiva normal  ENT: Normocephalic, without obvious abnormality, atraumatic, sinuses nontender on palpation, external ears without lesions, oral pharynx with moist mucous membranes, erythema to back of throat, no exudates  Hematologic / Lymphatic: no cervical lymphadenopathy  Respiratory: No increased work of breathing, good air exchange, clear to auscultation bilaterally, no crackles or wheezing  Cardiovascular:  Normal apical impulse, regular rate and rhythm, normal S1 and S2, no S3 or S4, and no murmur noted  GI: No scars, normal bowel sounds, soft, non-distended, non-tender, no masses palpated, no hepatosplenomegally  Genitounirinary: deferred  Skin: right foot shiny, red, peeling, left foot dry, minimal peeling, no erythema. Some mild swelling to left foot. Otherwise  normal skin color, texture, turgor, no redness, warmth, or swelling and no rashes  Musculoskeletal: There is no redness, warmth, or swelling of the joints.  Full range of motion noted.   Neurologic: Awake, alert, oriented to name, place and time.   Neuropsychiatric: General: normal, calm and normal eye contact    Data   Data reviewed today:   No lab results found in last 7 days.    No results found for this or any previous visit (from the past 24 hour(s)).

## 2023-01-05 LAB
ALBUMIN SERPL BCG-MCNC: 4.5 G/DL (ref 3.5–5.2)
ALP SERPL-CCNC: 64 U/L (ref 40–129)
ALT SERPL W P-5'-P-CCNC: 165 U/L (ref 10–50)
ANION GAP SERPL CALCULATED.3IONS-SCNC: 13 MMOL/L (ref 7–15)
AST SERPL W P-5'-P-CCNC: 56 U/L (ref 10–50)
BILIRUB SERPL-MCNC: 0.7 MG/DL
BUN SERPL-MCNC: 15.5 MG/DL (ref 6–20)
CALCIUM SERPL-MCNC: 9.5 MG/DL (ref 8.6–10)
CHLORIDE SERPL-SCNC: 103 MMOL/L (ref 98–107)
CREAT SERPL-MCNC: 0.77 MG/DL (ref 0.67–1.17)
DEPRECATED HCO3 PLAS-SCNC: 22 MMOL/L (ref 22–29)
GFR SERPL CREATININE-BSD FRML MDRD: >90 ML/MIN/1.73M2
GLUCOSE SERPL-MCNC: 84 MG/DL (ref 70–99)
POTASSIUM SERPL-SCNC: 4.3 MMOL/L (ref 3.4–5.3)
PROT SERPL-MCNC: 7.2 G/DL (ref 6.4–8.3)
SODIUM SERPL-SCNC: 138 MMOL/L (ref 136–145)

## 2023-01-05 PROCEDURE — 99221 1ST HOSP IP/OBS SF/LOW 40: CPT | Performed by: NURSE PRACTITIONER

## 2023-01-05 PROCEDURE — 250N000013 HC RX MED GY IP 250 OP 250 PS 637: Performed by: NURSE PRACTITIONER

## 2023-01-05 PROCEDURE — 36415 COLL VENOUS BLD VENIPUNCTURE: CPT | Performed by: NURSE PRACTITIONER

## 2023-01-05 PROCEDURE — 80053 COMPREHEN METABOLIC PANEL: CPT | Performed by: NURSE PRACTITIONER

## 2023-01-05 PROCEDURE — 99232 SBSQ HOSP IP/OBS MODERATE 35: CPT | Performed by: NURSE PRACTITIONER

## 2023-01-05 PROCEDURE — 99231 SBSQ HOSP IP/OBS SF/LOW 25: CPT | Performed by: NURSE PRACTITIONER

## 2023-01-05 PROCEDURE — 124N000001 HC R&B MH

## 2023-01-05 RX ADMIN — GABAPENTIN 600 MG: 300 CAPSULE ORAL at 21:20

## 2023-01-05 RX ADMIN — AMOXICILLIN 500 MG: 500 CAPSULE ORAL at 08:32

## 2023-01-05 RX ADMIN — DICLOFENAC SODIUM 4 G: 10 GEL TOPICAL at 21:22

## 2023-01-05 RX ADMIN — DICLOFENAC SODIUM 4 G: 10 GEL TOPICAL at 16:50

## 2023-01-05 RX ADMIN — DICLOFENAC SODIUM 4 G: 10 GEL TOPICAL at 14:07

## 2023-01-05 RX ADMIN — DIPHENHYDRAMINE HYDROCHLORIDE AND LIDOCAINE HYDROCHLORIDE AND ALUMINUM HYDROXIDE AND MAGNESIUM HYDRO 10 ML: KIT at 07:47

## 2023-01-05 RX ADMIN — DULOXETINE HYDROCHLORIDE 30 MG: 30 CAPSULE, DELAYED RELEASE ORAL at 21:20

## 2023-01-05 RX ADMIN — DIPHENHYDRAMINE HYDROCHLORIDE AND LIDOCAINE HYDROCHLORIDE AND ALUMINUM HYDROXIDE AND MAGNESIUM HYDRO 10 ML: KIT at 16:49

## 2023-01-05 RX ADMIN — DIPHENHYDRAMINE HYDROCHLORIDE AND LIDOCAINE HYDROCHLORIDE AND ALUMINUM HYDROXIDE AND MAGNESIUM HYDRO 10 ML: KIT at 21:21

## 2023-01-05 RX ADMIN — GABAPENTIN 600 MG: 300 CAPSULE ORAL at 14:07

## 2023-01-05 RX ADMIN — DICLOFENAC SODIUM 4 G: 10 GEL TOPICAL at 07:46

## 2023-01-05 RX ADMIN — DIPHENHYDRAMINE HYDROCHLORIDE AND LIDOCAINE HYDROCHLORIDE AND ALUMINUM HYDROXIDE AND MAGNESIUM HYDRO 10 ML: KIT at 12:19

## 2023-01-05 RX ADMIN — IBUPROFEN 400 MG: 400 TABLET ORAL at 04:41

## 2023-01-05 RX ADMIN — DULOXETINE HYDROCHLORIDE 30 MG: 30 CAPSULE, DELAYED RELEASE ORAL at 08:32

## 2023-01-05 RX ADMIN — IBUPROFEN 400 MG: 400 TABLET ORAL at 22:09

## 2023-01-05 RX ADMIN — AMOXICILLIN 500 MG: 500 CAPSULE ORAL at 21:20

## 2023-01-05 RX ADMIN — GABAPENTIN 600 MG: 300 CAPSULE ORAL at 08:32

## 2023-01-05 ASSESSMENT — ACTIVITIES OF DAILY LIVING (ADL)
ADLS_ACUITY_SCORE: 50
HYGIENE/GROOMING: INDEPENDENT
ORAL_HYGIENE: INDEPENDENT
DRESS: SCRUBS (BEHAVIORAL HEALTH)
ADLS_ACUITY_SCORE: 50

## 2023-01-05 NOTE — PLAN OF CARE
"Problem: Adult Behavioral Health Plan of Care  Goal: Patient-Specific Goal (Individualization)  Description: Patient will sleep 6 to 8 hours per night  Patient will eat at least 50% of meals  Patient will attend at least 50% of groups  Patient will comply with recommendations of treatment team  Patient will remain medication compliant  Patient ok to use shower chair for bathing.   Outcome: Progressing     Problem: Suicide Risk  Goal: Absence of Self-Harm  Description: Pt will be free of self harm while on unit.   Outcome: Progressing     Face to face shift report received from ALONZO Marmolejo. Rounding completed, pt observed in the lounge with peers.    Met with patient & wound care this morning, wound care discontinued the zinc oxide and put in a patient care order for \"Remedy\" brand Zinc Paste (Orange tube) - non Rx, floor stock as this has petrolatum in it to help with dryness.      Patient ate breakfast & lunch in the lounge and ate 100%.    Patient took all scheduled medications as ordered.    Patient has been attending groups as available & social with peers.    Patient had no reported or observed suicidal behavior or self harm this shift.      Patient states that his foot pain is doing much better today and rates it at 4/10, he said he was able to do the elliptical in group this afternoon and he said it felt amazing to be able to bend his knees and move the way that he hasn't been able to in a long time.     Face to face report will be communicated to oncoming ALONZO.    Elvira Martinez RN  1/5/2023  2:44 PM    "

## 2023-01-05 NOTE — PLAN OF CARE
"Report received from outgoing staff.    Problem: Adult Behavioral Health Plan of Care  Goal: Patient-Specific Goal (Individualization)  Description: Patient will sleep 6 to 8 hours per night  Patient will eat at least 50% of meals  Patient will attend at least 50% of groups  Patient will comply with recommendations of treatment team  Patient will remain medication compliant  Patient ok to use shower chair for bathing.   Outcome: Not Progressing     Problem: Suicide Risk  Goal: Absence of Self-Harm  Description: Pt will be free of self harm while on unit.   Outcome: Not Progressing    Rounds completed. Safety checks completed every 15 minutes throughout the night. Pt noted to be resting with eyes closed and easy resp. for 3.5 hours. No suicidal gestures or comments noted.    0441 - Pt requests Ibuprofen for foot pain 5/10 and it was given. Pt states, \"The pain isn't bad I just want to get ahead of it before it gets worse.\" Pt also reports at this time that he slept \"like a rock\". Pt is now up in the lounge visiting with peers.    Face to face end of shift report to be communicated to oncoming RN.    "

## 2023-01-05 NOTE — PROGRESS NOTES
[facility]    Medical Services Progress Note    Date of Service (when I saw the patient): 01/05/2023    Assessment & Plan     Active Problems:    Strep pharyngitis- pt tested positive on 12/31. He was started on Amoxicillin on 1/3. Amoxicillin ordered. Magic mouthwash ordered. Chloraseptic spray as needed. He reports his throat is feeling better.   1/5- reports he is feeling better. Compliant with antibiotic. Denies side effects.      Frost bite bilateral feet- pt was transported to [place] and admitted. His frostbite continued to improve and he was medically cleared for placement. Left foot looks pretty much all healed. Some dryness, peeling and some mild edema noted but no erythema. R foot shiny, red, peeling, to toes and foot. He reports he is still experiencing some pain especially when bearing weight. Will see about getting him some shoes he can wear on the unit. Central supply does not have the phytoplex lotion that was recommended at discharge from his inpatient stay. Wound care consult order placed and talked with them today and they will see him tomorrow for further recommendations. Voltaren gel ordered. Zinc oxide ointment ordered to replace phytoplex until wound care sees him. Can use shower chair.   1/5- wound care seen pt today. See note. Remedy zinc oxide paste. Reports today his feet feel better than they have. Mother is bringing some slides to wear. No areas of concern. Nursing to continue to monitor and consult for new or worsening symptoms.     Pt medically stable, no acute medical concerns. Chronic medical problems stable. Will sign off. Please consult for any new medical issues or concerns.       Code Status: Full Code.    Gem Stafford CNP      -Data reviewed today: I reviewed all new labs and imaging results over the last 24 hours.     Physical Exam   Temp: 97  F (36.1  C) Temp src: Temporal BP: 120/77 Pulse: 90   Resp: 14 SpO2: 95 % O2 Device: None (Room air)    Vitals:    01/03/23  1841   Weight: 132.8 kg (292 lb 12.8 oz)     Vital Signs with Ranges  Temp:  [97  F (36.1  C)] 97  F (36.1  C)  Pulse:  [90] 90  Resp:  [14] 14  BP: (120)/(77) 120/77  SpO2:  [95 %] 95 %  No intake/output data recorded.    Constitutional: awake, alert, cooperative, no apparent distress, and appears stated age, vitals stable   Respiratory: No increased work of breathing, good air exchange, clear to auscultation bilaterally, no crackles or wheezing  Cardiovascular: Normal apical impulse, regular rate and rhythm, normal S1 and S2, no S3 or S4, and no murmur noted  Neurologic: Awake, alert, oriented to name, place and time.   Neuropsychiatric: General: normal, calm and normal eye contact    Medications     amoxicillin  500 mg Oral BID     diclofenac  4 g Topical 4x Daily     DULoxetine  30 mg Oral BID     gabapentin  600 mg Oral TID     magic mouthwash suspension (diphenhydrAMINE, lidocaine, aluminum-magnesium & simethicone)  10 mL Swish & Spit 4x Daily w/meals     [Held by provider] petrolatum-zinc oxide   Topical BID       Data   Recent Labs   Lab 01/05/23  0714      POTASSIUM 4.3   CHLORIDE 103   CO2 22   BUN 15.5   CR 0.77   ANIONGAP 13   ANTHONY 9.5   GLC 84   ALBUMIN 4.5   PROTTOTAL 7.2   BILITOTAL 0.7   ALKPHOS 64   *   AST 56*       No results found for this or any previous visit (from the past 24 hour(s)).

## 2023-01-05 NOTE — PLAN OF CARE
"  Problem: Adult Behavioral Health Plan of Care  Goal: Patient-Specific Goal (Individualization)  Description: Patient will sleep 6 to 8 hours per night  Patient will eat at least 50% of meals  Patient will attend at least 50% of groups  Patient will comply with recommendations of treatment team  Patient will remain medication compliant  Patient ok to use shower chair for bathing.   Note: Patient up to unit all shift, socializing with peers and attending all groups. Bright affect, clear speech and friendly during conversation. Denies SI/HI, hallucinations and anxiety at this time. Reports some continued depression stating, \"I'm working through it\".   Maintains appropriate boundaries and socializing with peers well.   2022- Received PRN Tylenol 650 mg and PRN Ibuprofen 400 mg for reports of bilateral foot pain rated 8/10.   Complaint with scheduled medications.   Patient also given an ice pack and a heat pack per request. Reports being told to put them each on specific feet but not being given them earlier. Will report to next shift to have addressed in the morning.   Laying in bed to rest by 2100 for remainder of shift. Continue to monitor at this time.      Problem: Suicide Risk  Goal: Absence of Self-Harm  Description: Pt will be free of self harm while on unit.   Note: Continue to monitor at this time.     Face to face end of shift report communicated to oncoming RN.     Monica Escamilla RN  1/4/2023  7:31 PM       "

## 2023-01-05 NOTE — CONSULTS
INPATIENT ROUNDING NOTE  1/5/2023    Patient: Jose Alexander    Physician of Record: [facility]    Admitting diagnosis: Suicidal behavior [R45.89]    Length of stay: 2    Patient is seen for frostbite of his bilateral toes.  He states there is pain to the toes which is improving. He feels the toes are dry.     CURRENT MEDICATIONS:  Continuous Medications:  Current Facility-Administered Medications   Medication Last Rate       Scheduled Medications:  Current Facility-Administered Medications   Medication Dose Route Frequency     amoxicillin  500 mg Oral BID     diclofenac  4 g Topical 4x Daily     DULoxetine  30 mg Oral BID     gabapentin  600 mg Oral TID     magic mouthwash suspension (diphenhydrAMINE, lidocaine, aluminum-magnesium & simethicone)  10 mL Swish & Spit 4x Daily w/meals     [Held by provider] petrolatum-zinc oxide   Topical BID       PRN Medications:  Current Facility-Administered Medications   Medication Dose Route Frequency     acetaminophen  650 mg Oral Q4H PRN     alum & mag hydroxide-simethicone  30 mL Oral Q4H PRN     hydrOXYzine  25 mg Oral Q4H PRN     ibuprofen  400 mg Oral Q6H PRN     melatonin  3 mg Oral At Bedtime PRN     OLANZapine  10 mg Oral TID PRN    Or     OLANZapine  10 mg Intramuscular TID PRN     phenol  1 spray Mouth/Throat Q1H PRN     senna-docusate  1 tablet Oral BID PRN       SUBJECTIVE:   Nausea: No. Vomiting: No. Fever: No. Chills: No. Excessive burping: No. Tolerating current diet: Yes.     PHYSICAL EXAM:   Vital signs: /77   Pulse 90   Temp 97  F (36.1  C) (Temporal)   Resp 14   Ht 1.829 m (6')   Wt 132.8 kg (292 lb 12.8 oz)   SpO2 95%   BMI 39.71 kg/m     BMI: Body mass index is 39.71 kg/m .   General: Normal, healthy, cooperative, in no acute distress, alert   Lungs: respirations are non-labored   Abdominal: non-distended   Extremities: Minus one pinpoint open area to bilateral toes, remainder of frostbite is completely healed at this  time   Neurological: without deficit    INPUT/OUTPUT:    No intake or output data in the 24 hours ending 01/05/23 0841    No intake/output data recorded.    LABS:    Last CBC Rrsults:   Recent Labs   Lab Test 12/19/22  1804 11/08/21  1050   WBC 21.0* 9.5   RBC 6.10* 5.68   HGB 17.4 16.3   HCT 50.2 47.2   MCV 82 83   MCH 28.5 28.7   MCHC 34.7 34.5   RDW 12.1 13.1    260       Last Comprehensive Metabolic panel:  Recent Labs   Lab Test 01/05/23  0714 12/19/22  1804 11/08/21  1050    140 140   POTASSIUM 4.3 4.1 3.9   CHLORIDE 103 107 109   CO2 22 22 27   ANIONGAP 13 11 4   GLC 84 85 86   BUN 15.5 15.9 16   CR 0.77 0.76 0.97   GFRESTIMATED >90 >90 >90   ANTHONY 9.5 9.0 9.3   BILITOTAL 0.7 0.4 0.5   ALKPHOS 64 79 87   * 90* 64   AST 56* 31 27       Recent Labs   Lab Test 01/05/23  0714 12/19/22  1804 11/08/21  1050   ALBUMIN 4.5 4.3 4.4       ASSESSMENT:    27 year old male admitted for Suicidal behavior [R45.89].  Hospital day 2.  Seen for frostbite of bilateral toes.      PLAN: Apply remedy zinc paste (which has petrolatum and zinc in it) to toes bilaterally bid.

## 2023-01-05 NOTE — PLAN OF CARE
Problem: Adult Behavioral Health Plan of Care  Goal: Patient-Specific Goal (Individualization)  Description: Patient will sleep 6 to 8 hours per night  Patient will eat at least 50% of meals  Patient will attend at least 50% of groups  Patient will comply with recommendations of treatment team  Patient will remain medication compliant  Patient ok to use shower chair for bathing.   1/5/23 - ok for patient to use his own Velcro slides while on the unit.  Outcome: Progressing     Problem: Suicide Risk  Goal: Absence of Self-Harm  Description: Pt will be free of self harm while on unit.   Outcome: Progressing   Goal Outcome Evaluation:       Pt. Has been up and about on unit, pleasant and cooperative with cares, slept 3.5 hours last night, appetite is good, eating at least 50% of meals, is compliant with treatment team recommendations, taking prescribed medications, is able to make needs be known, denies depression, anxiety and suicidal ideation, will continue to monitor progress.  2209-  Pt. Requested/received ibuprofen 400 mg po for bilateral foot pain, 8 of 10.      Face to face end of shift report will be communicated to oncoming night shift RN.     Angeles Vazquez RN  1/5/2023  7:17 PM

## 2023-01-05 NOTE — PROGRESS NOTES
"[facility] PSYCHIATRY  PROGRESS NOTE     SUBJECTIVE     Jose is up in group when I see him today. He reports that he feels \"pretty good actually\". He states that the groups have been very helpful and he is interested in attending [facility] so will place referral. He denies any active thoughts of suicide today. He states that his mother will be coming to visit [facility] and his dad will visit over the weekend, so he is looking forward to this as they have been worried about him. He reports feeling less depressed. Wound care did come to evaluate his frostbite, ordered some cream. He states that his feet are \"feeling better today\". He states that they are less painful.        MEDICATIONS   Scheduled Meds:    amoxicillin  500 mg Oral BID     diclofenac  4 g Topical 4x Daily     DULoxetine  30 mg Oral BID     gabapentin  600 mg Oral TID     magic mouthwash suspension (diphenhydrAMINE, lidocaine, aluminum-magnesium & simethicone)  10 mL Swish & Spit 4x Daily w/meals     [Held by provider] petrolatum-zinc oxide   Topical BID     PRN Meds:.acetaminophen, alum & mag hydroxide-simethicone, hydrOXYzine, ibuprofen, melatonin, OLANZapine **OR** OLANZapine, phenol, senna-docusate     ALLERGIES   No Known Allergies     MENTAL STATUS EXAM   Vitals: /80   Pulse 92   Temp 97.5  F (36.4  C) (Temporal)   Resp 14   Ht 1.829 m (6')   Wt 132.8 kg (292 lb 12.8 oz)   SpO2 95%   BMI 39.71 kg/m      Appearance:  awake, alert, adequately groomed, dressed in hospital scrubs and appeared as age stated  Attitude:  cooperative, pleasant  Eye Contact:  good  Mood: less depressed today  Affect:  brighter  Speech:  clear, coherent  Psychomotor Behavior:  no evidence of tardive dyskinesia, dystonia, or tics  Thought Process:  linear and goal oriented  Associations:  no loose associations  Thought Content:  no evidence of suicidal ideation or homicidal ideation and no evidence of psychotic thought  Insight:  fair  Judgment:  fair  Oriented " to:  time, person, and place  Attention Span and Concentration:  intact  Recent and Remote Memory:  intact  Fund of Knowledge: appropriate for education  Muscle Strength and Tone: normal  Gait and Station: slow gait d/t pain in feet     LABS   Recent Results (from the past 24 hour(s))   Comprehensive metabolic panel    Collection Time: 01/05/23  7:14 AM   Result Value Ref Range    Sodium 138 136 - 145 mmol/L    Potassium 4.3 3.4 - 5.3 mmol/L    Chloride 103 98 - 107 mmol/L    Carbon Dioxide (CO2) 22 22 - 29 mmol/L    Anion Gap 13 7 - 15 mmol/L    Urea Nitrogen 15.5 6.0 - 20.0 mg/dL    Creatinine 0.77 0.67 - 1.17 mg/dL    Calcium 9.5 8.6 - 10.0 mg/dL    Glucose 84 70 - 99 mg/dL    Alkaline Phosphatase 64 40 - 129 U/L    AST 56 (H) 10 - 50 U/L     (H) 10 - 50 U/L    Protein Total 7.2 6.4 - 8.3 g/dL    Albumin 4.5 3.5 - 5.2 g/dL    Bilirubin Total 0.7 <=1.2 mg/dL    GFR Estimate >90 >60 mL/min/1.73m2         IMPRESSION     This is a 27 year old male with a PMH of depression and anxiety who was found by police in his car after an ingestion of clonidine, lunesta, and effexor. He was found to be hypothermic and had frostbite to bilateral feet which required debridement at [facility]. He was medically stabilized on a medical unit from 12/19-1/3 and transitioned to behavioral health as a voluntary patient for treatment. Cymbalta and Gabapentin were started while he was at [facility] for neuropathic pain though also for depression/anxiety. Patient is denying any suicidal thoughts today and feels that his mood has gradually been improving over the course of his inpatient stay. He is interested in considering PHP, does find the groups helpful here. He also works with [facility] for therapy and med management and is agreeable to continue these as well. Medical NP will see him regarding his feet and any needed treatment or referrals. He is agreeable to remaining in the hospital voluntarily at this point, though if  he should ask to leave prior to a safe discharge plan being in place, would likely place him on a hold.       DIAGNOSES     1. MDD, recurrent, severe  2. Unspecified anxiety disorder, RENE vs social anxiety  3. Frost bite, bilateral feet       PLAN     Location: Unit 5  Legal Status: Orders Placed This Encounter      Legal status Voluntary    Safety Assessment:    Behavioral Orders   Procedures     Code 1 - Restrict to Unit     Routine Programming     As clinically indicated     Status 15     Every 15 minutes.      PTA psychotropic medications stopped:     -Stop Effexor. This had been restarted at low dose at [facility] to limit withdrawal    PTA psychotropic medications continued/changed:     -Cymbalta 30 mg BID  -Gabapentin 600 mg TID for pain    New medications tried and stopped:     -None    New medications initiated:     -None today    Today's Changes:    -Continue current medications  -Place referral for PHP      Programming: Patient will be treated in a therapeutic milieu with appropriate individual and group therapies. Education will be provided on diagnoses, medications, and treatments.     Medical diagnoses:  Per medicine    Consult: None  Tests: LFTs still slightly elevated however lab note indicates the specimen was hemolyzed which can falsely elevate AST. Will recheck this weekend.    Anticipated LOS: 7 days  Disposition: likely home with PHP       TREATMENT TEAM CARE PLAN     Progress: Continued symptoms.    Continued Stay Criteria/Rationale: Continued symptoms without sufficient improvement/resolution.    Medical/Physical: See above.    Precautions: See above.     Plan: Continue inpatient care with unit support and medication management.    Rationale for change in precautions or plan: NA due to no change.    Participants: Carmenza Puente NP, Nursing, SW, OT.    The patient's care was discussed with the treatment team and chart notes were reviewed.       ATTESTATION      Carmenza Puente NP

## 2023-01-06 PROCEDURE — 124N000001 HC R&B MH

## 2023-01-06 PROCEDURE — 250N000013 HC RX MED GY IP 250 OP 250 PS 637: Performed by: NURSE PRACTITIONER

## 2023-01-06 PROCEDURE — 99232 SBSQ HOSP IP/OBS MODERATE 35: CPT | Performed by: NURSE PRACTITIONER

## 2023-01-06 RX ADMIN — AMOXICILLIN 500 MG: 500 CAPSULE ORAL at 08:22

## 2023-01-06 RX ADMIN — GABAPENTIN 600 MG: 300 CAPSULE ORAL at 20:25

## 2023-01-06 RX ADMIN — AMOXICILLIN 500 MG: 500 CAPSULE ORAL at 20:25

## 2023-01-06 RX ADMIN — GABAPENTIN 600 MG: 300 CAPSULE ORAL at 13:44

## 2023-01-06 RX ADMIN — GABAPENTIN 600 MG: 300 CAPSULE ORAL at 08:22

## 2023-01-06 RX ADMIN — DICLOFENAC SODIUM 4 G: 10 GEL TOPICAL at 17:06

## 2023-01-06 RX ADMIN — DICLOFENAC SODIUM 4 G: 10 GEL TOPICAL at 13:53

## 2023-01-06 RX ADMIN — DULOXETINE HYDROCHLORIDE 30 MG: 30 CAPSULE, DELAYED RELEASE ORAL at 08:22

## 2023-01-06 RX ADMIN — DULOXETINE HYDROCHLORIDE 30 MG: 30 CAPSULE, DELAYED RELEASE ORAL at 20:25

## 2023-01-06 RX ADMIN — DIPHENHYDRAMINE HYDROCHLORIDE AND LIDOCAINE HYDROCHLORIDE AND ALUMINUM HYDROXIDE AND MAGNESIUM HYDRO 10 ML: KIT at 12:10

## 2023-01-06 RX ADMIN — DICLOFENAC SODIUM 4 G: 10 GEL TOPICAL at 08:37

## 2023-01-06 RX ADMIN — DIPHENHYDRAMINE HYDROCHLORIDE AND LIDOCAINE HYDROCHLORIDE AND ALUMINUM HYDROXIDE AND MAGNESIUM HYDRO 10 ML: KIT at 08:11

## 2023-01-06 RX ADMIN — DICLOFENAC SODIUM 4 G: 10 GEL TOPICAL at 20:26

## 2023-01-06 RX ADMIN — DIPHENHYDRAMINE HYDROCHLORIDE AND LIDOCAINE HYDROCHLORIDE AND ALUMINUM HYDROXIDE AND MAGNESIUM HYDRO 10 ML: KIT at 17:06

## 2023-01-06 ASSESSMENT — ACTIVITIES OF DAILY LIVING (ADL)
ORAL_HYGIENE: INDEPENDENT
ADLS_ACUITY_SCORE: 50
DRESS: INDEPENDENT;SCRUBS (BEHAVIORAL HEALTH)
ORAL_HYGIENE: INDEPENDENT
ADLS_ACUITY_SCORE: 50
HYGIENE/GROOMING: INDEPENDENT
ADLS_ACUITY_SCORE: 50
LAUNDRY: UNABLE TO COMPLETE
ADLS_ACUITY_SCORE: 50
HYGIENE/GROOMING: INDEPENDENT
ADLS_ACUITY_SCORE: 50
DRESS: INDEPENDENT;SCRUBS (BEHAVIORAL HEALTH)
ADLS_ACUITY_SCORE: 50

## 2023-01-06 NOTE — PLAN OF CARE
Report received from outgoing staff.    Problem: Adult Behavioral Health Plan of Care  Goal: Patient-Specific Goal (Individualization)  Description: Patient will sleep 6 to 8 hours per night  Patient will eat at least 50% of meals  Patient will attend at least 50% of groups  Patient will comply with recommendations of treatment team  Patient will remain medication compliant  Patient ok to use shower chair for bathing.   1/5/23 - ok for patient to use his own Velcro slides while on the unit.  1/6/2023 0325 by Jaleel Coyd, RN  Outcome: Not Progressing    Problem: Suicide Risk  Goal: Absence of Self-Harm  Description: Pt will be free of self harm while on unit.   1/6/2023 0325 by Jaleel Cody, RN  Outcome: Not Progressing    Rounds completed. Safety checks completed every 15 minutes throughout the night. Pt noted to be resting with eyes closed and easy resp. for 6 hours. No suicidal gestures or comments noted.    Face to face end of shift report to be communicated to oncoming RN.

## 2023-01-06 NOTE — PLAN OF CARE
Problem: Adult Behavioral Health Plan of Care  Goal: Patient-Specific Goal (Individualization)  Description: Patient will sleep 6 to 8 hours per night  Patient will eat at least 50% of meals  Patient will attend at least 50% of groups  Patient will comply with recommendations of treatment team  Patient will remain medication compliant  Patient ok to use shower chair for bathing.   1/5/23 - ok for patient to use his own Velcro slides while on the unit.  Outcome: Progressing     Problem: Suicide Risk  Goal: Absence of Self-Harm  Description: Pt will be free of self harm while on unit.   Outcome: Progressing   Goal Outcome Evaluation:       Pt. Has been up and about on unit, denies depression, anxiety and suicidal ideation, slept 6 hours last night, attending group therapy sessions, compliant with treatment team recommendations, taking prescribed medications, is independent on unit, is able to make needs be known, pleasant, polite and cooperative, frostbite to feet is healing, using medicated lotion for pain, has been free from harm/injury, will continue to monitor progress.    Face to face end of shift report will be communicated to oncoming night shift RN.     Angeles Vazquez RN  1/6/2023  6:27 PM

## 2023-01-06 NOTE — PLAN OF CARE
Sent referral to [facility] for patient per his request - also have him a brochure to review.  Per PHP he is eligible and his insurance will cover.  They are able to do an intake appointment on Monday January 16 at 10:30.

## 2023-01-06 NOTE — PLAN OF CARE
Report received from outgoing staff.    Problem: Adult Behavioral Health Plan of Care  Goal: Patient-Specific Goal (Individualization)  Description: Patient will sleep 6 to 8 hours per night  Patient will eat at least 50% of meals  Patient will attend at least 50% of groups  Patient will comply with recommendations of treatment team  Patient will remain medication compliant  Patient ok to use shower chair for bathing.   1/5/23 - ok for patient to use his own Velcro slides while on the unit.  Outcome: Not Progressing     Problem: Suicide Risk  Goal: Absence of Self-Harm  Description: Pt will be free of self harm while on unit.   Outcome: Not Progressing    Rounds completed. Safety checks completed every 15 minutes throughout the night. Pt noted to be resting with eyes closed and easy resp. for 6 hours. No suicidal gestures or comments noted.    Face to face end of shift report to be communicated to oncoming RN.

## 2023-01-06 NOTE — PLAN OF CARE
"Face to face report received from Jaleel ROSADO. Pt. Observed.     Problem: Adult Behavioral Health Plan of Care  Goal: Patient-Specific Goal (Individualization)  Description: Patient will sleep 6 to 8 hours per night  Patient will eat at least 50% of meals  Patient will attend at least 50% of groups  Patient will comply with recommendations of treatment team  Patient will remain medication compliant  Patient ok to use shower chair for bathing.   1/5/23 - ok for patient to use his own Velcro slides while on the unit.  Outcome: Progressing  Pt. Denies HI, SI, depression, and hallucinations. Pt. Reporting anxiety and mild pain to throat that is 0/10 after administration of mouth wash. Pt. Pain gel applied to feet bilaterally. Pt. Right foot red and peeling warm to touch no s/s of infection. Left foot pink warm to touch no s/s of infection. Pt. Reporting minimal pain. \"They feel better then they did. I thought I was going to lose toes.\" Pt. Gait is slow, balanced and steady. Pt. In agreement to update staff to thoughts feelings of wanting to harm self or others. Pt. Cooperative with medications and nursing assessment. Pt. in agreement to attend unit programing. Out to lounge socializing with peers. Pt. Eating WDL. Offers no c/o issues with bowel and bladder.     Face to face end of shift report communicated to oncoming RN.     Shiela Rudd RN  1/6/2023  10:26 AM       "

## 2023-01-06 NOTE — PROGRESS NOTES
"[facility] PSYCHIATRY  PROGRESS NOTE     SUBJECTIVE     Jose is up in the lounge when I see him today. He reports feeling \"good\" today. He reports his feet being a little more swollen today, has been directed to elevate feet when in bed. Reviewed that PHP referral was placed and SW will look at getting him a start date. He reports that he has really been enjoying groups while here and would like to continue groups through the weekend and possible target discharge for Monday. He denies any suicidal thoughts. His affect appears brighter and he reports feeling more hopeful for the future. He reports sleeping well. Denies any side effects from medications.       MEDICATIONS   Scheduled Meds:    amoxicillin  500 mg Oral BID     diclofenac  4 g Topical 4x Daily     DULoxetine  30 mg Oral BID     gabapentin  600 mg Oral TID     magic mouthwash suspension (diphenhydrAMINE, lidocaine, aluminum-magnesium & simethicone)  10 mL Swish & Spit 4x Daily w/meals     [Held by provider] petrolatum-zinc oxide   Topical BID     PRN Meds:.acetaminophen, alum & mag hydroxide-simethicone, hydrOXYzine, ibuprofen, melatonin, OLANZapine **OR** OLANZapine, phenol, senna-docusate     ALLERGIES   No Known Allergies     MENTAL STATUS EXAM   Vitals: /79   Pulse 90   Temp 98.2  F (36.8  C) (Tympanic)   Resp 18   Ht 1.829 m (6')   Wt 132.8 kg (292 lb 12.8 oz)   SpO2 96%   BMI 39.71 kg/m      Appearance:  awake, alert, adequately groomed, dressed in hospital scrubs and appeared as age stated  Attitude:  cooperative, pleasant  Eye Contact:  good  Mood: improving depression and anxiety  Affect:  brighter  Speech:  clear, coherent  Psychomotor Behavior:  no evidence of tardive dyskinesia, dystonia, or tics  Thought Process:  linear and goal oriented, logical  Associations:  no loose associations  Thought Content:  no evidence of suicidal ideation or homicidal ideation and no evidence of psychotic thought  Insight:  fair, " improving  Judgment:  fair  Oriented to:  time, person, and place  Attention Span and Concentration:  intact  Recent and Remote Memory:  intact  Fund of Knowledge: appropriate for education  Muscle Strength and Tone: normal  Gait and Station: slow gait d/t pain in feet     LABS   No results found for this or any previous visit (from the past 24 hour(s)).      IMPRESSION     This is a 27 year old male with a PMH of depression and anxiety who was found by police in his car after an ingestion of clonidine, lunesta, and effexor. He was found to be hypothermic and had frostbite to bilateral feet which required debridement at [facility]. He was medically stabilized on a medical unit from 12/19-1/3 and transitioned to behavioral health as a voluntary patient for treatment. Cymbalta and Gabapentin were started while he was at [facility] for neuropathic pain though also for depression/anxiety. Patient is denying any suicidal thoughts today and feels that his mood has gradually been improving over the course of his inpatient stay. He is interested in considering PHP, does find the groups helpful here. He also works with [facility] for therapy and med management and is agreeable to continue these as well. Medical NP will see him regarding his feet and any needed treatment or referrals. He is agreeable to remaining in the hospital voluntarily at this point, though if he should ask to leave prior to a safe discharge plan being in place, would likely place him on a hold.       DIAGNOSES     1. MDD, recurrent, severe  2. Unspecified anxiety disorder, RENE vs social anxiety  3. Frost bite, bilateral feet       PLAN     Location: Unit 5  Legal Status: Orders Placed This Encounter      Legal status Voluntary    Safety Assessment:    Behavioral Orders   Procedures     Code 1 - Restrict to Unit     Routine Programming     As clinically indicated     Status 15     Every 15 minutes.      PTA psychotropic medications stopped:      -Stop Effexor. This had been restarted at low dose at [facility] to limit withdrawal    PTA psychotropic medications continued/changed:     -Cymbalta 30 mg BID  -Gabapentin 600 mg TID for pain    New medications tried and stopped:     -None    New medications initiated:     -None today    Today's Changes:    -Continue current medications  -Referral for PHP, SW will work on scheduling start date  -Elevate feet when in bed to help with swelling in feet      Programming: Patient will be treated in a therapeutic milieu with appropriate individual and group therapies. Education will be provided on diagnoses, medications, and treatments.     Medical diagnoses:  Per medicine    Consult: None  Tests: Recheck CMP this weekend    Anticipated LOS: 7 days  Disposition: likely home with PHP       ATTESTATION      Carmenza Puente NP

## 2023-01-06 NOTE — PROGRESS NOTES
01/05/23 1905   Patient Belongings   Did you bring any home meds/supplements to the hospital?  Yes   Disposition of meds  Sent to security/pharmacy per site process   Patient Belongings sent to security per site process;locker   Patient Belongings Put in Hospital Secure Location (Security or Locker, etc.) shoes;clothing   Belongings Search Yes   Clothing Search Yes   Second Staff crystal   Comment black sweatpants, boxers, grey jacket, tan shoes, black sandals, grey shirt     List items sent to safe: bluetooth headphones, brown wallet, [place] license, 2 insurance cards, [employer] one card, library card, [employer] card, visa debit, covid vaccine card    All other belongings put in assigned cubby in belongings room.       I have reviewed my belongings list on admission and verify that it is correct.     Patient signature_______________________________    Second staff witness (if patient unable to sign) ______________________________       I have received all my belongings at discharge.    Patient signature________________________________    Shelby  1/5/2023  7:06 PM

## 2023-01-07 PROCEDURE — 124N000001 HC R&B MH

## 2023-01-07 PROCEDURE — 250N000013 HC RX MED GY IP 250 OP 250 PS 637: Performed by: NURSE PRACTITIONER

## 2023-01-07 PROCEDURE — 99232 SBSQ HOSP IP/OBS MODERATE 35: CPT | Performed by: NURSE PRACTITIONER

## 2023-01-07 RX ORDER — NALOXONE HYDROCHLORIDE 0.4 MG/ML
0.2 INJECTION, SOLUTION INTRAMUSCULAR; INTRAVENOUS; SUBCUTANEOUS
Status: DISCONTINUED | OUTPATIENT
Start: 2023-01-07 | End: 2023-01-09 | Stop reason: HOSPADM

## 2023-01-07 RX ORDER — NALOXONE HYDROCHLORIDE 0.4 MG/ML
0.4 INJECTION, SOLUTION INTRAMUSCULAR; INTRAVENOUS; SUBCUTANEOUS
Status: DISCONTINUED | OUTPATIENT
Start: 2023-01-07 | End: 2023-01-09 | Stop reason: HOSPADM

## 2023-01-07 RX ORDER — TRAMADOL HYDROCHLORIDE 50 MG/1
50-100 TABLET ORAL EVERY 6 HOURS PRN
Status: DISCONTINUED | OUTPATIENT
Start: 2023-01-07 | End: 2023-01-09 | Stop reason: HOSPADM

## 2023-01-07 RX ADMIN — AMOXICILLIN 500 MG: 500 CAPSULE ORAL at 20:03

## 2023-01-07 RX ADMIN — DICLOFENAC SODIUM 4 G: 10 GEL TOPICAL at 16:38

## 2023-01-07 RX ADMIN — ACETAMINOPHEN 650 MG: 325 TABLET, FILM COATED ORAL at 18:32

## 2023-01-07 RX ADMIN — DICLOFENAC SODIUM 4 G: 10 GEL TOPICAL at 13:26

## 2023-01-07 RX ADMIN — DIPHENHYDRAMINE HYDROCHLORIDE AND LIDOCAINE HYDROCHLORIDE AND ALUMINUM HYDROXIDE AND MAGNESIUM HYDRO 10 ML: KIT at 12:00

## 2023-01-07 RX ADMIN — DICLOFENAC SODIUM 4 G: 10 GEL TOPICAL at 20:10

## 2023-01-07 RX ADMIN — DIPHENHYDRAMINE HYDROCHLORIDE AND LIDOCAINE HYDROCHLORIDE AND ALUMINUM HYDROXIDE AND MAGNESIUM HYDRO 10 ML: KIT at 16:38

## 2023-01-07 RX ADMIN — TRAMADOL HYDROCHLORIDE 100 MG: 50 TABLET, COATED ORAL at 20:03

## 2023-01-07 RX ADMIN — DULOXETINE HYDROCHLORIDE 30 MG: 30 CAPSULE, DELAYED RELEASE ORAL at 20:03

## 2023-01-07 RX ADMIN — HYDROXYZINE HYDROCHLORIDE 25 MG: 25 TABLET ORAL at 18:32

## 2023-01-07 RX ADMIN — DICLOFENAC SODIUM 4 G: 10 GEL TOPICAL at 08:41

## 2023-01-07 RX ADMIN — GABAPENTIN 600 MG: 300 CAPSULE ORAL at 13:26

## 2023-01-07 RX ADMIN — GABAPENTIN 600 MG: 300 CAPSULE ORAL at 20:03

## 2023-01-07 RX ADMIN — AMOXICILLIN 500 MG: 500 CAPSULE ORAL at 08:39

## 2023-01-07 RX ADMIN — IBUPROFEN 400 MG: 400 TABLET ORAL at 18:32

## 2023-01-07 RX ADMIN — GABAPENTIN 600 MG: 300 CAPSULE ORAL at 08:39

## 2023-01-07 RX ADMIN — DULOXETINE HYDROCHLORIDE 30 MG: 30 CAPSULE, DELAYED RELEASE ORAL at 08:39

## 2023-01-07 ASSESSMENT — ACTIVITIES OF DAILY LIVING (ADL)
ADLS_ACUITY_SCORE: 50
HYGIENE/GROOMING: INDEPENDENT
ORAL_HYGIENE: INDEPENDENT
LAUNDRY: UNABLE TO COMPLETE
ADLS_ACUITY_SCORE: 50
DRESS: INDEPENDENT;SCRUBS (BEHAVIORAL HEALTH)

## 2023-01-07 NOTE — PLAN OF CARE
Face to face report received from Jaleel ROSADO. Pt. Observed.     Problem: Adult Behavioral Health Plan of Care  Goal: Patient-Specific Goal (Individualization)  Description: Patient will sleep 6 to 8 hours per night  Patient will eat at least 50% of meals  Patient will attend at least 50% of groups  Patient will comply with recommendations of treatment team  Patient will remain medication compliant  Patient ok to use shower chair for bathing.   1/5/23 - ok for patient to use his own Velcro slides while on the unit.  Outcome: Progressing     Pt. Denies HI, SI, depression, and hallucinations. Pt. Reporting anxiety and mild pain to throat that is only bothersome when he eats. Pt. Pain gel applied to feet bilaterally. Pt. Right foot red and peeling warm to touch. Left foot pink warm to touch no s/s of infection. Pt. Reporting minimal pain to left foot only. Pt. Gait is slow, balanced and steady. Pt. In agreement to update staff to thoughts feelings of wanting to harm self or others. Pt. Cooperative with medications and nursing assessment. Pt. in agreement to attend unit programing. Out to lounge socializing with peers. Pt. Eating WDL. Offers no c/o issues with bowel and bladder.    Face to face end of shift report communicated to oncoming RN.     Shiela Rudd RN  1/7/2023

## 2023-01-07 NOTE — PLAN OF CARE
"Report received from outgoing staff.     Problem: Adult Behavioral Health Plan of Care  Goal: Patient-Specific Goal (Individualization)  Description: Patient will sleep 6 to 8 hours per night  Patient will eat at least 50% of meals  Patient will attend at least 50% of groups  Patient will comply with recommendations of treatment team  Patient will remain medication compliant  Patient ok to use shower chair for bathing.   1/5/23 - ok for patient to use his own Velcro slides while on the unit.  Outcome: Not Progressing     Problem: Suicide Risk  Goal: Absence of Self-Harm  Description: Pt will be free of self harm while on unit.   Outcome: Not Progressing    Rounds completed. Safety checks completed every 15 minutes throughout the night. Pt noted to be resting with eyes closed and easy resp. for 6 hours. No suicidal gestures or comments noted.    Pt is up this morning and reports that his feet are \"stiff\" and he just wants to walk it out. Pt denies need for pain medication at this time.     Face to face end of shift report to be communicated to oncoming RN.      "

## 2023-01-07 NOTE — PLAN OF CARE
"  Problem: Adult Behavioral Health Plan of Care  Goal: Patient-Specific Goal (Individualization)  Description: Patient will sleep 6 to 8 hours per night  Patient will eat at least 50% of meals  Patient will attend at least 50% of groups  Patient will comply with recommendations of treatment team  Patient will remain medication compliant  Patient ok to use shower chair for bathing.   1/5/23 - ok for patient to use his own Velcro slides while on the unit.  Note: Patient up to unit for majority of shift, socializing with peers and participating in groups. Full range affect, clear speech and friendly during conversation. Denies SI/HI and anxiety at this time. Continues to report depression but states, \"I am looking forward to going home on Monday and starting PHP\".   1832- Received PRN Tylenol 650 mg, Ibuprofen 400 mg and Atarax 25 mg for reports of bilateral foot pain rated 9/10. Patient reports little to no relief within the hour and states, \"they hurt so bad I can't think of anything else\". Both feet appear to have a moderate amount of swelling noted in them and appears to be going up the ankles more than previously assessed 2 nights ago. On-call provider called about pain at this time.   2003- Received PRN Tramadol 100 mg for continued reports of pain rated 9/10. Patient also elevating feet and utilizing an ice pack as needed. Reports pain is \"feeling a little better\" within the hour.   Showered this evening. Compliant with scheduled medications. Laying down to rest by 2200 for remainder of shift. Continue to monitor at this time.       Problem: Suicide Risk  Goal: Absence of Self-Harm  Description: Pt will be free of self harm while on unit.   Note: Continue to monitor at this time.     Face to face end of shift report communicated to oncpatsy ROSADO.     Monica Escamilla RN  1/7/2023  5:37 PM       "

## 2023-01-08 PROCEDURE — 250N000013 HC RX MED GY IP 250 OP 250 PS 637: Performed by: NURSE PRACTITIONER

## 2023-01-08 PROCEDURE — 124N000001 HC R&B MH

## 2023-01-08 PROCEDURE — 99232 SBSQ HOSP IP/OBS MODERATE 35: CPT | Performed by: NURSE PRACTITIONER

## 2023-01-08 RX ORDER — GABAPENTIN 400 MG/1
800 CAPSULE ORAL 3 TIMES DAILY
Status: DISCONTINUED | OUTPATIENT
Start: 2023-01-08 | End: 2023-01-09 | Stop reason: HOSPADM

## 2023-01-08 RX ADMIN — DICLOFENAC SODIUM 4 G: 10 GEL TOPICAL at 20:55

## 2023-01-08 RX ADMIN — DULOXETINE HYDROCHLORIDE 30 MG: 30 CAPSULE, DELAYED RELEASE ORAL at 21:44

## 2023-01-08 RX ADMIN — DICLOFENAC SODIUM 4 G: 10 GEL TOPICAL at 13:55

## 2023-01-08 RX ADMIN — GABAPENTIN 600 MG: 300 CAPSULE ORAL at 08:32

## 2023-01-08 RX ADMIN — AMOXICILLIN 500 MG: 500 CAPSULE ORAL at 08:32

## 2023-01-08 RX ADMIN — DICLOFENAC SODIUM 4 G: 10 GEL TOPICAL at 08:36

## 2023-01-08 RX ADMIN — GABAPENTIN 800 MG: 400 CAPSULE ORAL at 21:43

## 2023-01-08 RX ADMIN — DIPHENHYDRAMINE HYDROCHLORIDE AND LIDOCAINE HYDROCHLORIDE AND ALUMINUM HYDROXIDE AND MAGNESIUM HYDRO 10 ML: KIT at 07:49

## 2023-01-08 RX ADMIN — GABAPENTIN 800 MG: 400 CAPSULE ORAL at 13:54

## 2023-01-08 RX ADMIN — AMOXICILLIN 500 MG: 500 CAPSULE ORAL at 21:44

## 2023-01-08 RX ADMIN — DULOXETINE HYDROCHLORIDE 30 MG: 30 CAPSULE, DELAYED RELEASE ORAL at 08:32

## 2023-01-08 RX ADMIN — DICLOFENAC SODIUM 4 G: 10 GEL TOPICAL at 17:47

## 2023-01-08 RX ADMIN — TRAMADOL HYDROCHLORIDE 100 MG: 50 TABLET, COATED ORAL at 20:52

## 2023-01-08 ASSESSMENT — ACTIVITIES OF DAILY LIVING (ADL)
ADLS_ACUITY_SCORE: 50

## 2023-01-08 NOTE — PLAN OF CARE
"Face to face report received from Angeles ROSADO. Pt. Observed.     Problem: Adult Behavioral Health Plan of Care  Goal: Patient-Specific Goal (Individualization)  Description: Patient will sleep 6 to 8 hours per night  Patient will eat at least 50% of meals  Patient will attend at least 50% of groups  Patient will comply with recommendations of treatment team  Patient will remain medication compliant  Patient ok to use shower chair for bathing.   1/5/23 - ok for patient to use his own Velcro slides while on the unit.  Outcome: Progressing  Pt. Denies HI, SI, anxiety, depression, and hallucinations. \"I feel good. I'm ready to leave. I needed those groups I went to with William. He made me feel so much better.\" Pt. Continues to report mild pain to throat that is only bothersome when he eats. Pt. Pain gel applied to feet bilaterally. Pt. Right foot light red and peeling warm to touch no s/s of infection. Looking less swollen and red than yesterday. Pt. Reports it feels better. Left foot pink warm to touch no s/s of infection. Pt. Using prescribed lotion without zinc, Per his request. Pt. Reporting minimal pain to left foot only. Pt. Gait is slow, balanced and steady. Pt. In agreement to update staff to thoughts feelings of wanting to harm self or others. Pt. Cooperative with medications and nursing assessment. Pt. in agreement to attend unit programing. Out to lounge socializing with peers. Pt. Eating WDL. Offers no c/o issues with bowel and bladder.    Pt. Continues to c/o sore throat. Provider called. Awaiting call back.     Face to face end of shift report communicated to oncoming RN.     Shiela Rudd RN  1/8/2023  12:54 PM       "

## 2023-01-08 NOTE — PROGRESS NOTES
"[facility] PSYCHIATRY  PROGRESS NOTE     SUBJECTIVE     Jose is up in the lounge when I see him today watching the football game with peers. He reports feeling ready for discharge tomorrow. He states that his mother can come to pick him up about 5 PM, \"then I can still go to groups during the day\". He has been attending most of the group programming and is finding it very beneficial. Referral for PHP was sent, will hopefully be able to et him a start date before he leaves. He denies any suicidal thoughts and feels that mood has improved. He does continue to report pain in his feet due to frostbite, we go agree to increase the Gabapentin as he is finding this to be helpful.      MEDICATIONS   Scheduled Meds:    amoxicillin  500 mg Oral BID     diclofenac  4 g Topical 4x Daily     DULoxetine  30 mg Oral BID     gabapentin  800 mg Oral TID     magic mouthwash suspension (diphenhydrAMINE, lidocaine, aluminum-magnesium & simethicone)  10 mL Swish & Spit 4x Daily w/meals     [Held by provider] petrolatum-zinc oxide   Topical BID     PRN Meds:.acetaminophen, alum & mag hydroxide-simethicone, hydrOXYzine, ibuprofen, melatonin, naloxone **OR** naloxone **OR** naloxone **OR** naloxone, OLANZapine **OR** OLANZapine, phenol, senna-docusate, traMADol     ALLERGIES   No Known Allergies     MENTAL STATUS EXAM   Vitals: /75   Pulse 80   Temp 96.8  F (36  C) (Temporal)   Resp 14   Ht 1.829 m (6')   Wt 129.5 kg (285 lb 8 oz)   SpO2 96%   BMI 38.72 kg/m      Appearance:  awake, alert, adequately groomed, dressed in hospital scrubs and appeared as age stated  Attitude:  cooperative, pleasant  Eye Contact:  good  Mood: improving depression and anxiety  Affect:  brighter  Speech:  clear, coherent  Psychomotor Behavior:  no evidence of tardive dyskinesia, dystonia, or tics  Thought Process:  linear and goal oriented, logical  Associations:  no loose associations  Thought Content:  no evidence of suicidal ideation or " homicidal ideation and no evidence of psychotic thought  Insight:  fair, improving  Judgment:  intact  Oriented to:  time, person, and place  Attention Span and Concentration:  intact  Recent and Remote Memory:  intact  Fund of Knowledge: appropriate for education  Muscle Strength and Tone: normal  Gait and Station: slow gait d/t pain in feet     LABS   No results found for this or any previous visit (from the past 24 hour(s)).      IMPRESSION     This is a 27 year old male with a PMH of depression and anxiety who was found by police in his car after an ingestion of clonidine, lunesta, and effexor. He was found to be hypothermic and had frostbite to bilateral feet which required debridement at [facility]. He was medically stabilized on a medical unit from 12/19-1/3 and transitioned to behavioral health as a voluntary patient for treatment. Cymbalta and Gabapentin were started while he was at [facility] for neuropathic pain though also for depression/anxiety. Patient is denying any suicidal thoughts today and feels that his mood has gradually been improving over the course of his inpatient stay. He is interested in considering PHP, does find the groups helpful here. He also works with [facility] for therapy and med management and is agreeable to continue these as well. Medical NP will see him regarding his feet and any needed treatment or referrals. He is agreeable to remaining in the hospital voluntarily at this point, though if he should ask to leave prior to a safe discharge plan being in place, would likely place him on a hold.       DIAGNOSES     1. MDD, recurrent, severe  2. Unspecified anxiety disorder, RENE vs social anxiety  3. Frost bite, bilateral feet       PLAN     Location: Unit 5  Legal Status: Orders Placed This Encounter      Legal status Voluntary    Safety Assessment:    Behavioral Orders   Procedures     Code 1 - Restrict to Unit     Routine Programming     As clinically indicated     Status  15     Every 15 minutes.      PTA psychotropic medications stopped:     -Stop Effexor. This had been restarted at low dose at [facility] to limit withdrawal    PTA psychotropic medications continued/changed:     -Cymbalta 30 mg BID  -Gabapentin 600 mg TID for pain--> 800 mg TID on 1/8    New medications tried and stopped:     -None    New medications initiated:     -None today    Today's Changes:    -Increase Gabapentin to 800 mg TID, pt feels this is helping with pain  -Referral for PHP, SW will work on scheduling start date  -Elevate feet when in bed to help with swelling in feet. Will look at getting him established with someone in the clinic for follow-up  -Discharge home tomorrow    Programming: Patient will be treated in a therapeutic milieu with appropriate individual and group therapies. Education will be provided on diagnoses, medications, and treatments.     Medical diagnoses:  Per medicine    Consult: None  Tests: CMP    Anticipated LOS: 7 days  Disposition: home with PHP, likely tomorrow       ATTESTATION      Carmenza Puente NP

## 2023-01-08 NOTE — PLAN OF CARE
Problem: Adult Behavioral Health Plan of Care  Goal: Patient-Specific Goal (Individualization)  Description: Patient will sleep 6 to 8 hours per night  Patient will eat at least 50% of meals  Patient will attend at least 50% of groups  Patient will comply with recommendations of treatment team  Patient will remain medication compliant  Patient ok to use shower chair for bathing.   1/5/23 - ok for patient to use his own Velcro slides while on the unit.  Outcome: Progressing  Note: Report received from Monica. Rounding complete. Pt observed sleeping in supine position with regular and unlabored respirations.    Pt has been in bed with eyes closed and regular respirations. 15 minute and PRN checks all night. No complaints offered. Will continue to monitor.    0545- Pt returned ice pack and was provided requested aloe vesta for his feet. He denied any other needs at this time. Pt up for the morning and socializing with a peer in the lounge.     Pt slept approx  7.5  hours this NOC shift.    Face to face end of shift report communicated to oncoming RN.    Angeles JUAN RN  January 8, 2023  5:03 AM          Problem: Suicide Risk  Goal: Absence of Self-Harm  Description: Pt will be free of self harm while on unit.   Outcome: Progressing  Note: Unable to assess due to pt sleeping. Pt has remained free of self-harm.     Goal Outcome Evaluation:

## 2023-01-08 NOTE — PLAN OF CARE
Problem: Adult Behavioral Health Plan of Care  Goal: Patient-Specific Goal (Individualization)  Description: Patient will sleep 6 to 8 hours per night  Patient will eat at least 50% of meals  Patient will attend at least 50% of groups  Patient will comply with recommendations of treatment team  Patient will remain medication compliant  Patient ok to use shower chair for bathing.   1/5/23 - ok for patient to use his own Velcro slides while on the unit.  Outcome: Progressing  Note: 15:45: Received end of shift report from ALONZO Kuo.     22:30: Pt out et about on unit, participates in 100% of PM groups, visits with peers et staff appropriately. Full range affect, mood is calm. PRN ultram given during HS, in addition to ice pack for left foot; improved skin condition reported, states throat remains sore only when swallowing food--- pleasant. Relevant, reality based conversation.       Face to face end of shift report communicated to on-coming NOC staff.     Abi De La Torre RN  1/8/2023  11:36 PM    Problem: Suicide Risk  Goal: Absence of Self-Harm  Description: Pt will be free of self harm while on unit.   Outcome: Progressing   Goal Outcome Evaluation:

## 2023-01-08 NOTE — PROGRESS NOTES
"[facility] PSYCHIATRY  PROGRESS NOTE     SUBJECTIVE     Jose is up in the lounge when I see him today, agrees to meet in his room. He reports that things are \"still going good\". He does voice some frustration with a fellow peer who he reports that made inappropriate comments to some of the other patients. He has been attending groups, feels they have been \"super helpful. There was one about shame and it was the perfect group for me\". He denies any side effects from medication. He will be participating in the PHP program after discharge. Reviewed that we will also look at getting a PCP appointment scheduled, as he states he currently does not have one, prefers the [facility], for continued follow-up with the frostbite on his feet. His affect has been bright, patient has been denying any suicidal thoughts.      MEDICATIONS   Scheduled Meds:    amoxicillin  500 mg Oral BID     diclofenac  4 g Topical 4x Daily     DULoxetine  30 mg Oral BID     gabapentin  800 mg Oral TID     magic mouthwash suspension (diphenhydrAMINE, lidocaine, aluminum-magnesium & simethicone)  10 mL Swish & Spit 4x Daily w/meals     [Held by provider] petrolatum-zinc oxide   Topical BID     PRN Meds:.acetaminophen, alum & mag hydroxide-simethicone, hydrOXYzine, ibuprofen, melatonin, naloxone **OR** naloxone **OR** naloxone **OR** naloxone, OLANZapine **OR** OLANZapine, phenol, senna-docusate, traMADol     ALLERGIES   No Known Allergies     MENTAL STATUS EXAM   Vitals: /75   Pulse 80   Temp 96.8  F (36  C) (Temporal)   Resp 14   Ht 1.829 m (6')   Wt 129.5 kg (285 lb 8 oz)   SpO2 96%   BMI 38.72 kg/m      Appearance:  awake, alert, adequately groomed, dressed in hospital scrubs and appeared as age stated  Attitude:  cooperative, pleasant  Eye Contact:  good  Mood: improving depression and anxiety  Affect:  brighter  Speech:  clear, coherent  Psychomotor Behavior:  no evidence of tardive dyskinesia, dystonia, or tics  Thought " Process:  linear and goal oriented, logical  Associations:  no loose associations  Thought Content:  no evidence of suicidal ideation or homicidal ideation and no evidence of psychotic thought  Insight:  fair, improving  Judgment:  fair  Oriented to:  time, person, and place  Attention Span and Concentration:  intact  Recent and Remote Memory:  intact  Fund of Knowledge: appropriate for education  Muscle Strength and Tone: normal  Gait and Station: slow gait d/t pain in feet     LABS   No results found for this or any previous visit (from the past 24 hour(s)).      IMPRESSION     This is a 27 year old male with a PMH of depression and anxiety who was found by police in his car after an ingestion of clonidine, lunesta, and effexor. He was found to be hypothermic and had frostbite to bilateral feet which required debridement at [facility]. He was medically stabilized on a medical unit from 12/19-1/3 and transitioned to behavioral health as a voluntary patient for treatment. Cymbalta and Gabapentin were started while he was at [facility] for neuropathic pain though also for depression/anxiety. Patient is denying any suicidal thoughts today and feels that his mood has gradually been improving over the course of his inpatient stay. He is interested in considering PHP, does find the groups helpful here. He also works with [facility] for therapy and med management and is agreeable to continue these as well. Medical NP will see him regarding his feet and any needed treatment or referrals. He is agreeable to remaining in the hospital voluntarily at this point, though if he should ask to leave prior to a safe discharge plan being in place, would likely place him on a hold.       DIAGNOSES     1. MDD, recurrent, severe  2. Unspecified anxiety disorder, RENE vs social anxiety  3. Frost bite, bilateral feet       PLAN     Location: Unit 5  Legal Status: Orders Placed This Encounter      Legal status Voluntary    Safety  Assessment:    Behavioral Orders   Procedures     Code 1 - Restrict to Unit     Routine Programming     As clinically indicated     Status 15     Every 15 minutes.      PTA psychotropic medications stopped:     -Stop Effexor. This had been restarted at low dose at [facility] to limit withdrawal    PTA psychotropic medications continued/changed:     -Cymbalta 30 mg BID  -Gabapentin 600 mg TID for pain    New medications tried and stopped:     -None    New medications initiated:     -None today    Today's Changes:    -Continue current medications  -Referral for [facility], SW will work on scheduling start date  -Elevate feet when in bed to help with swelling in feet. Will look at getting him established with someone in the clinic for follow-up      Programming: Patient will be treated in a therapeutic milieu with appropriate individual and group therapies. Education will be provided on diagnoses, medications, and treatments.     Medical diagnoses:  Per medicine    Consult: None  Tests: CMP    Anticipated LOS: 7 days  Disposition: home with [facility], likely on monday       ATTESTATION      Carmenza Puente NP

## 2023-01-09 VITALS
HEIGHT: 72 IN | SYSTOLIC BLOOD PRESSURE: 134 MMHG | TEMPERATURE: 96.9 F | RESPIRATION RATE: 16 BRPM | DIASTOLIC BLOOD PRESSURE: 84 MMHG | OXYGEN SATURATION: 95 % | WEIGHT: 285.5 LBS | BODY MASS INDEX: 38.67 KG/M2 | HEART RATE: 85 BPM

## 2023-01-09 LAB
ALBUMIN SERPL BCG-MCNC: 4.4 G/DL (ref 3.5–5.2)
ALP SERPL-CCNC: 65 U/L (ref 40–129)
ALT SERPL W P-5'-P-CCNC: 105 U/L (ref 10–50)
ANION GAP SERPL CALCULATED.3IONS-SCNC: 10 MMOL/L (ref 7–15)
AST SERPL W P-5'-P-CCNC: 36 U/L (ref 10–50)
BILIRUB SERPL-MCNC: 0.4 MG/DL
BUN SERPL-MCNC: 11.9 MG/DL (ref 6–20)
CALCIUM SERPL-MCNC: 9.5 MG/DL (ref 8.6–10)
CHLORIDE SERPL-SCNC: 104 MMOL/L (ref 98–107)
CREAT SERPL-MCNC: 0.76 MG/DL (ref 0.67–1.17)
DEPRECATED HCO3 PLAS-SCNC: 26 MMOL/L (ref 22–29)
GFR SERPL CREATININE-BSD FRML MDRD: >90 ML/MIN/1.73M2
GLUCOSE SERPL-MCNC: 88 MG/DL (ref 70–99)
HOLD SPECIMEN: NORMAL
HOLD SPECIMEN: NORMAL
POTASSIUM SERPL-SCNC: 4.6 MMOL/L (ref 3.4–5.3)
PROT SERPL-MCNC: 6.8 G/DL (ref 6.4–8.3)
SODIUM SERPL-SCNC: 140 MMOL/L (ref 136–145)

## 2023-01-09 PROCEDURE — 250N000013 HC RX MED GY IP 250 OP 250 PS 637: Performed by: NURSE PRACTITIONER

## 2023-01-09 PROCEDURE — 36415 COLL VENOUS BLD VENIPUNCTURE: CPT | Performed by: NURSE PRACTITIONER

## 2023-01-09 PROCEDURE — 99239 HOSP IP/OBS DSCHRG MGMT >30: CPT | Performed by: NURSE PRACTITIONER

## 2023-01-09 PROCEDURE — 80053 COMPREHEN METABOLIC PANEL: CPT | Performed by: NURSE PRACTITIONER

## 2023-01-09 RX ORDER — GABAPENTIN 800 MG/1
800 TABLET ORAL 3 TIMES DAILY
Qty: 90 TABLET | Refills: 0 | Status: SHIPPED | OUTPATIENT
Start: 2023-01-09 | End: 2023-02-05

## 2023-01-09 RX ORDER — DULOXETIN HYDROCHLORIDE 30 MG/1
30 CAPSULE, DELAYED RELEASE ORAL 2 TIMES DAILY
Qty: 60 CAPSULE | Refills: 0 | Status: SHIPPED | OUTPATIENT
Start: 2023-01-09 | End: 2023-02-05

## 2023-01-09 RX ORDER — AMOXICILLIN 500 MG/1
500 CAPSULE ORAL 2 TIMES DAILY
Qty: 2 CAPSULE | Refills: 0 | Status: SHIPPED | OUTPATIENT
Start: 2023-01-09 | End: 2023-01-23

## 2023-01-09 RX ADMIN — TRAMADOL HYDROCHLORIDE 100 MG: 50 TABLET, COATED ORAL at 12:36

## 2023-01-09 RX ADMIN — AMOXICILLIN 500 MG: 500 CAPSULE ORAL at 08:31

## 2023-01-09 RX ADMIN — DICLOFENAC SODIUM 4 G: 10 GEL TOPICAL at 14:34

## 2023-01-09 RX ADMIN — DICLOFENAC SODIUM 4 G: 10 GEL TOPICAL at 08:32

## 2023-01-09 RX ADMIN — GABAPENTIN 800 MG: 400 CAPSULE ORAL at 08:30

## 2023-01-09 RX ADMIN — GABAPENTIN 800 MG: 400 CAPSULE ORAL at 14:34

## 2023-01-09 RX ADMIN — DULOXETINE HYDROCHLORIDE 30 MG: 30 CAPSULE, DELAYED RELEASE ORAL at 08:31

## 2023-01-09 ASSESSMENT — ACTIVITIES OF DAILY LIVING (ADL)
ADLS_ACUITY_SCORE: 50
HYGIENE/GROOMING: INDEPENDENT
ADLS_ACUITY_SCORE: 50
LAUNDRY: UNABLE TO COMPLETE
DRESS: SCRUBS (BEHAVIORAL HEALTH)
ADLS_ACUITY_SCORE: 50
ORAL_HYGIENE: INDEPENDENT

## 2023-01-09 NOTE — PLAN OF CARE
Problem: Adult Behavioral Health Plan of Care  Goal: Patient-Specific Goal (Individualization)  Description: Patient will sleep 6 to 8 hours per night  Patient will eat at least 50% of meals  Patient will attend at least 50% of groups  Patient will comply with recommendations of treatment team  Patient will remain medication compliant  Patient ok to use shower chair for bathing.   1/5/23 - ok for patient to use his own Velcro slides while on the unit.  Outcome: Progressing  Note: Report received from Abi. Rounding complete. Pt observed sleeping in supine position with regular and unlabored respirations.    Pt has been in bed with eyes closed and regular respirations. 15 minute and PRN checks all night. No complaints offered. Will continue to monitor.    Pt slept approx  7.5  hours this NOC shift.    Face to face end of shift report communicated to oncoming RN.    Angeles JUAN RN  January 9, 2023  1:30 AM          Problem: Suicide Risk  Goal: Absence of Self-Harm  Description: Pt will be free of self harm while on unit.   Outcome: Progressing  Note: Unable to assess due to pt sleeping. No issues or concerns noted at this time.     Goal Outcome Evaluation:

## 2023-01-09 NOTE — PLAN OF CARE
Met with patient and reviewed his discharge plans - he will come for an intake appointment with [facility] next Monday -reviewed where to go and answered any questions.  He plans to discharge later today.

## 2023-01-09 NOTE — DISCHARGE SUMMARY
[facility] PSYCHIATRY  DISCHARGE SUMMARY     DISCHARGE DATA     Jose Alexander MRN# [id number]   Age: 27 year old YOB: 1995     Date of Admission: 1/3/2023  Date of Discharge: January 9, 2023  Discharge Provider: Carmenza Puente NP       REASON FOR ADMISSION     Jose is a 27 year old single male who initially presented to the [facility] ED via ambulance after intentional drug overdose. Per EMS he had ingested 30 of his 0.1 mg clonidine, 30 of his 2 mg Lunesta, and about 20-30 of his 150 mg effexor the night before as a suicide attempt. He stayed in his cold car until he was found by police. Was found to be hypothermic, rectal temp 92. He was found to have frostbite to bilateral feet and nontraumatic rhabdomyolysis so was transferred to [facility] for medical care on 12/19/22. He was subsequently medically cleared on 1/3/23 and was transferred back to [facility] to behavioral health for further evaluation and treatment as a voluntary patient.    See H&P completed by MIKE Puente CNP for full admission information       DISCHARGE DIAGNOSES     1. MDD, recurrent, severe  2. RENE  3. Frost bite, bilateral feet       CONSULTS     Wound care- for care of frostbite to bilateral feet       HOSPITAL COURSE     Legal status: Orders Placed This Encounter      Legal status Voluntary    Patient was admitted to unit 5 due to the aforementioned presentation. The patient was placed under 15 minute checks to ensure patient safety. The patient participated in unit programming and groups as able. Patient was seen by medical for treatment of strep pharyngitis and frostbite to bilateral feet, both of which were improving by time of discharge. Cymbalta and Gabapentin were continued for both treatment of neuropathic pain but also treatment of depression and anxiety. He tolerated these medication well, denied side effects. He denied any further suicidal thoughts during his hospital stay. His affect was brighter and he  was social with peers. He felt that his mood and anxiety both improved and he was sleeping well at night. He attended most of the group programming and was interested in outpatient groups, so referral was made for the PHP program, which he will start next week. Today he feels adequately ready for discharge. He has psychiatry follow-up as well as medical follow-up scheduled. He will also have PHP intake on 1/16/23. He lives with his parents and his mother is willing to come pick him up this afternoon. Will allow him to discharge home with mother to follow-up with outpatient services.    Mr. Alexander did not require seclusion/restraint during hospitalization.     We reviewed with Mr. Alexander current and past medication trials including duration, dose, response and side effects. During this hospitalization, the following changes to the patient's psychotropic medications were made:    PTA psychotropic medications stopped:     -Effexor XR    PTA psychotropic medications continued/changed:     -Cymbalta 30 mg BID  -Gabapentin 800 mg TID    New psychotropic medications tried and stopped:     -none    New psychotropic medications initiated:     -none. Cymbalta and Gabapentin started at [facility] prior to admit    With these changes and supports the patient noticed improvement in their symptoms and felt sufficiently ready for discharge. As a result, Jose Alexander was discharged to home. At the time of discharge, Jose Alexander was determined to not be a danger to self or others. The patient was also medically stable for discharge. At the current time of discharge, the patient does not meet criteria for involuntary hospitalization. On the day of discharge, the patient reports that they do not have suicidal or homicidal ideation. Steps taken to minimize risk include: assessing patient s behavior and thought process daily during hospital stay, discharging patient with adequate plan for follow up for mental and physical  health and discussing safety plan of returning to the hospital should the patient ever have thoughts of harming themselves or others. Therefore, based on all available evidence including the factors cited above, the patient does not appear to be at imminent risk for self-harm, and is appropriate for outpatient level of care. However, if patient uses substances or is medication non-adherent, their risk of decompensation and SI will be elevated. This was discussed with the patient.       DISCHARGE MEDICATIONS     Current Discharge Medication List      START taking these medications    Details   gabapentin (NEURONTIN) 800 MG tablet Take 1 tablet (800 mg) by mouth 3 times daily  Qty: 90 tablet, Refills: 0    Associated Diagnoses: Major depressive disorder, recurrent severe without psychotic features (H); Generalized anxiety disorder      petrolatum-zinc oxide (PHYTOPLEX) 57-17 % paste Apply topically 2 times daily to bilateral feet  Qty: 113 g, Refills: 0    Associated Diagnoses: Frostbite of both feet, initial encounter         CONTINUE these medications which have CHANGED    Details   amoxicillin (AMOXIL) 500 MG capsule Take 1 capsule (500 mg) by mouth 2 times daily  Qty: 2 capsule, Refills: 0    Comments: To complete 10 day course. 2 doses left  Associated Diagnoses: Strep pharyngitis      diclofenac (VOLTAREN) 1 % topical gel Apply 4 g topically 4 times daily -apply gel to both feet, avoiding open area and rub into skin gently; apply to entire joint.  Qty: 350 g, Refills: 0    Associated Diagnoses: Frostbite of both feet, initial encounter      DULoxetine (CYMBALTA) 30 MG capsule Take 1 capsule (30 mg) by mouth 2 times daily  Qty: 60 capsule, Refills: 0    Associated Diagnoses: Major depressive disorder, recurrent severe without psychotic features (H)         CONTINUE these medications which have NOT CHANGED    Details   ibuprofen (ADVIL/MOTRIN) 400 MG tablet Take 400 mg by mouth every 6 hours as needed -administer  "with food.         STOP taking these medications       gabapentin (NEURONTIN) 300 MG capsule Comments:   Reason for Stopping: dose change        magic mouthwash suspension, diphenhydrAMINE, lidocaine, aluminum-magnesium & simethicone, (FIRST-MOUTHWASH BLM) compounding kit Comments:   Reason for Stopping: no longer using        phenol (CHLORASEPTIC) 1.4 % spray Comments:   Reason for Stopping: no longer using        venlafaxine (EFFEXOR XR) 37.5 MG 24 hr capsule Comments:   Reason for Stopping: discontinued            Reason for two or more neuroleptics: not applicable       MENTAL STATUS EXAM   Vitals: /77   Pulse 88   Temp 97.2  F (36.2  C) (Temporal)   Resp 16   Ht 1.829 m (6')   Wt 129.5 kg (285 lb 8 oz)   SpO2 96%   BMI 38.72 kg/m      Appearance: Alert, oriented, dressed in hospital scrubs, appears stated age   Attitude: Cooperative, pleasant   Eye Contact: Good  Mood: \"Better\"  Affect: Full range of affect  Speech: Normal rate and rhythm   Psychomotor Behavior: No tremor, rigidity, or psychomotor abnormality   Thought Process: Logical, goal directed, linear  Associations: No loose associations   Thought Content: Denies SI or plan. No SIB. Denies A/V hallucinations. No evidence of delusional thought.  Insight: Good  Judgment: Good  Oriented to: Person, place, and time  Attention Span and Concentration: Intact  Recent and Remote Memory: Intact  Language: English with appropriate syntax and vocabulary  Fund of Knowledge: Average  Muscle Strength and Tone: Grossly normal  Gait and Station: Grossly normal       DISCHARGE PLAN     1.  Education given regarding diagnostic and treatment options with risks, benefits and alternatives with adequate verbalization of understanding.  2.  Discharge to home. Upon detailed review of risk factors, patient amenable for release.   3.  Continue aforementioned medications and associated medication changes with follow-up by outpatient provider.  4.  Crisis management " planning in place.    5.  Nursing and  to review further discharge recommendations.   6.  Patient is being discharged with the following appointments as detailed below.      Health Care Follow-up:      [facility] Establish care meet and glen Willard- 1/23/23  @ 2:05 pm  [address] [id number] [phone]     [facility]- Follow-up   Rachna Juarez- 2/1/23 @ 2:05pm  [address] , [place] [address] [phone]     [facility]  Lydia Reeves - therapist  [address] [place]  [address]  Phone - [phone]  Fax - [phone]     [email or web address] - Karena Long  [address] [place] [address] [place] [address]  Phone: [phone]  Fax: [phone]        [facility]- PHP  *Check into admitting on 1st floor before the first appointment   Intake appointment Monday, January 16 @ 10:30  5th Floor Room [address]  [address] [address]  Phone: [phone]. [phone]  Fax: [phone]          DISCHARGE SERVICES PROVIDED     40 minutes spent on discharge services, including:  Final examination of patient.  Review and discussion of hospital stay.  Instructions for continued outpatient care/goals.  Preparation of discharge records.  Preparation of medications refills and new prescriptions.  Preparation of applicable referral forms.        ATTESTATION     Carmenza Puente NP       LABS THIS ADMISSION     Results for orders placed or performed during the hospital encounter of 01/03/23   Comprehensive metabolic panel     Status: Abnormal   Result Value Ref Range    Sodium 138 136 - 145 mmol/L    Potassium 4.3 3.4 - 5.3 mmol/L    Chloride 103 98 - 107 mmol/L    Carbon Dioxide (CO2) 22 22 - 29 mmol/L    Anion Gap 13 7 - 15 mmol/L    Urea Nitrogen 15.5 6.0 - 20.0 mg/dL    Creatinine 0.77 0.67 - 1.17 mg/dL    Calcium 9.5 8.6 - 10.0 mg/dL    Glucose 84 70 - 99 mg/dL    Alkaline Phosphatase 64 40  - 129 U/L    AST 56 (H) 10 - 50 U/L     (H) 10 - 50 U/L    Protein Total 7.2 6.4 - 8.3 g/dL    Albumin 4.5 3.5 - 5.2 g/dL    Bilirubin Total 0.7 <=1.2 mg/dL    GFR Estimate >90 >60 mL/min/1.73m2   Comprehensive metabolic panel     Status: Abnormal   Result Value Ref Range    Sodium 140 136 - 145 mmol/L    Potassium 4.6 3.4 - 5.3 mmol/L    Chloride 104 98 - 107 mmol/L    Carbon Dioxide (CO2) 26 22 - 29 mmol/L    Anion Gap 10 7 - 15 mmol/L    Urea Nitrogen 11.9 6.0 - 20.0 mg/dL    Creatinine 0.76 0.67 - 1.17 mg/dL    Calcium 9.5 8.6 - 10.0 mg/dL    Glucose 88 70 - 99 mg/dL    Alkaline Phosphatase 65 40 - 129 U/L    AST 36 10 - 50 U/L     (H) 10 - 50 U/L    Protein Total 6.8 6.4 - 8.3 g/dL    Albumin 4.4 3.5 - 5.2 g/dL    Bilirubin Total 0.4 <=1.2 mg/dL    GFR Estimate >90 >60 mL/min/1.73m2   Extra Tube     Status: None    Narrative    The following orders were created for panel order Extra Tube.  Procedure                               Abnormality         Status                     ---------                               -----------         ------                     Extra Red Top Tube[[id number]]                               Final result               Extra Purple Top Tube[[id number]]                            Final result                 Please view results for these tests on the individual orders.   Extra Red Top Tube     Status: None   Result Value Ref Range    Hold Specimen JIC    Extra Purple Top Tube     Status: None   Result Value Ref Range    Hold Specimen JIC

## 2023-01-09 NOTE — PLAN OF CARE
Problem: Suicide Risk  Goal: Absence of Self-Harm  Description: Pt will be free of self harm while on unit.   Outcome: Progressing     Problem: Adult Behavioral Health Plan of Care  Goal: Patient-Specific Goal (Individualization)  Description: Patient will sleep 6 to 8 hours per night  Patient will eat at least 50% of meals  Patient will attend at least 50% of groups  Patient will comply with recommendations of treatment team  Patient will remain medication compliant  Patient ok to use shower chair for bathing.   1/5/23 - ok for patient to use his own Velcro slides while on the unit.  Outcome: Progressing     Patient has been calm, cooperative, and medication compliant this shift.  He attended all groups and is social with peers and staff.  Feels he is ready to discharge home and has a safe discharge plan.  Plan for his mom to pick him up from the hospital at 1700.  Follow up appointments made.  Reported foot pain and took 100 mg Tramadol at 1236 with good relief of pain.  VS WNL.  Face to face end of shift report communicated to evening shift RN.     Danika Chen RN  1/9/2023  2:05 PM

## 2023-01-10 NOTE — PLAN OF CARE
Discharge Note    Patient Discharged to home on 1/9/2023 6:38 PM via Private Car accompanied by this writer to [facility]; mother awaiting.      Patient informed of discharge instructions in AVS. patient verbalizes understanding and denies having any questions pertaining to AVS. Patient stable at time of discharge. Patient denies SI, HI, and thoughts of self harm at time of discharge. All personal belongings returned to patient. Discharge [facility] sent to [facility] [place], [place] via electronic communication.     Abi De La Torre RN  1/9/2023  6:38 PM   Problem: Adult Behavioral Health Plan of Care  Goal: Plan of Care Review  Outcome: Met     Problem: Adult Behavioral Health Plan of Care  Goal: Individualized Daily Interaction Plan (IDIP)  Outcome: Met     Problem: Adult Behavioral Health Plan of Care  Goal: Adheres to Safety Considerations for Self and Others  Outcome: Met     Problem: Adult Behavioral Health Plan of Care  Goal: Absence of New-Onset Illness or Injury  Outcome: Met     Problem: Adult Behavioral Health Plan of Care  Goal: Optimized Coping Skills in Response to Life Stressors  Outcome: Met     Problem: Adult Behavioral Health Plan of Care  Goal: Develops/Participates in Therapeutic [place] to Support Successful Transition  Outcome: Met

## 2023-01-16 ENCOUNTER — HOSPITAL ENCOUNTER (OUTPATIENT)
Dept: BEHAVIORAL HEALTH | Facility: HOSPITAL | Age: 28
Discharge: HOME | End: 2023-01-16
Attending: SOCIAL WORKER | Admitting: SOCIAL WORKER
Payer: COMMERCIAL

## 2023-01-16 PROCEDURE — 90791 PSYCH DIAGNOSTIC EVALUATION: CPT | Performed by: SOCIAL WORKER

## 2023-01-16 PROCEDURE — H0035 MH PARTIAL HOSP TX UNDER 24H: HCPCS | Performed by: SOCIAL WORKER

## 2023-01-23 ENCOUNTER — OFFICE VISIT (OUTPATIENT)
Dept: FAMILY MEDICINE | Facility: OTHER | Age: 28
End: 2023-01-23
Attending: FAMILY MEDICINE
Payer: COMMERCIAL

## 2023-01-23 VITALS
BODY MASS INDEX: 40.24 KG/M2 | DIASTOLIC BLOOD PRESSURE: 86 MMHG | RESPIRATION RATE: 16 BRPM | HEART RATE: 89 BPM | OXYGEN SATURATION: 97 % | WEIGHT: 296.7 LBS | SYSTOLIC BLOOD PRESSURE: 132 MMHG | TEMPERATURE: 98.5 F

## 2023-01-23 DIAGNOSIS — R53.83 OTHER FATIGUE: ICD-10-CM

## 2023-01-23 DIAGNOSIS — F33.2 MAJOR DEPRESSIVE DISORDER, RECURRENT SEVERE WITHOUT PSYCHOTIC FEATURES (H): ICD-10-CM

## 2023-01-23 DIAGNOSIS — E66.01 MORBID OBESITY (H): ICD-10-CM

## 2023-01-23 DIAGNOSIS — Z09 HOSPITAL DISCHARGE FOLLOW-UP: Primary | ICD-10-CM

## 2023-01-23 DIAGNOSIS — E55.9 VITAMIN D DEFICIENCY: ICD-10-CM

## 2023-01-23 DIAGNOSIS — T14.91XA SUICIDE ATTEMPT (H): ICD-10-CM

## 2023-01-23 DIAGNOSIS — R29.818 SUSPECTED SLEEP APNEA: ICD-10-CM

## 2023-01-23 LAB — TSH SERPL DL<=0.005 MIU/L-ACNC: 1.13 UIU/ML (ref 0.3–4.2)

## 2023-01-23 PROCEDURE — 84443 ASSAY THYROID STIM HORMONE: CPT | Mod: ZL | Performed by: FAMILY MEDICINE

## 2023-01-23 PROCEDURE — G0463 HOSPITAL OUTPT CLINIC VISIT: HCPCS

## 2023-01-23 PROCEDURE — 82306 VITAMIN D 25 HYDROXY: CPT | Mod: ZL | Performed by: FAMILY MEDICINE

## 2023-01-23 PROCEDURE — 36415 COLL VENOUS BLD VENIPUNCTURE: CPT | Mod: ZL | Performed by: FAMILY MEDICINE

## 2023-01-23 PROCEDURE — 86364 TISS TRNSGLTMNASE EA IG CLAS: CPT | Mod: ZL,XU | Performed by: FAMILY MEDICINE

## 2023-01-23 PROCEDURE — 83970 ASSAY OF PARATHORMONE: CPT | Mod: ZL | Performed by: FAMILY MEDICINE

## 2023-01-23 PROCEDURE — 84100 ASSAY OF PHOSPHORUS: CPT | Mod: ZL | Performed by: FAMILY MEDICINE

## 2023-01-23 PROCEDURE — 99204 OFFICE O/P NEW MOD 45 MIN: CPT | Performed by: FAMILY MEDICINE

## 2023-01-23 RX ORDER — VENLAFAXINE HYDROCHLORIDE 150 MG/1
150 CAPSULE, EXTENDED RELEASE ORAL DAILY
COMMUNITY
End: 2023-02-20

## 2023-01-23 ASSESSMENT — PAIN SCALES - GENERAL: PAINLEVEL: MODERATE PAIN (5)

## 2023-01-23 ASSESSMENT — ANXIETY QUESTIONNAIRES
4. TROUBLE RELAXING: NOT AT ALL
2. NOT BEING ABLE TO STOP OR CONTROL WORRYING: NOT AT ALL
7. FEELING AFRAID AS IF SOMETHING AWFUL MIGHT HAPPEN: NOT AT ALL
3. WORRYING TOO MUCH ABOUT DIFFERENT THINGS: NOT AT ALL
6. BECOMING EASILY ANNOYED OR IRRITABLE: SEVERAL DAYS
GAD7 TOTAL SCORE: 3
1. FEELING NERVOUS, ANXIOUS, OR ON EDGE: SEVERAL DAYS
IF YOU CHECKED OFF ANY PROBLEMS ON THIS QUESTIONNAIRE, HOW DIFFICULT HAVE THESE PROBLEMS MADE IT FOR YOU TO DO YOUR WORK, TAKE CARE OF THINGS AT HOME, OR GET ALONG WITH OTHER PEOPLE: NOT DIFFICULT AT ALL
GAD7 TOTAL SCORE: 3
5. BEING SO RESTLESS THAT IT IS HARD TO SIT STILL: SEVERAL DAYS

## 2023-01-23 ASSESSMENT — PATIENT HEALTH QUESTIONNAIRE - PHQ9: SUM OF ALL RESPONSES TO PHQ QUESTIONS 1-9: 9

## 2023-01-23 NOTE — PROGRESS NOTES
Assessment & Plan     Hospital discharge follow-up    Major depressive disorder, recurrent severe without psychotic features (H)  Check labs not done in hospital.  - Vitamin D Deficiency  - TSH with free T4 reflex    Suicide attempt (H)    Suspected sleep apnea  - Adult Sleep Eval & Management  Referral; Future    Morbid obesity (H)  - Adult Sleep Eval & Management  Referral; Future  - Vitamin D Deficiency  - TSH with free T4 reflex    Other fatigue  - Adult Sleep Eval & Management  Referral; Future  - Vitamin D Deficiency  - TSH with free T4 reflex    Vitamin D deficiency  Found on labs drawn today, additional labs ordered, starting on replacement.  - vitamin D3 (CHOLECALCIFEROL) 1.25 MG (80695 UT) capsule; Take 1 capsule (50,000 Units) by mouth every 7 days for 10 doses  - Phosphorus  - Parathyroid Hormone Intact  - Tissue transglutaminase indra IgA and IgG      Will need to repeat/boost HepB vaccines, also complete his HepA series.  He wants to hold for now.  Strep throat resolved.  Frostbite feet appear to be healing appropriately.  Follow-up in approx 1 month.  Correction 2/1/23  2:41pm:  Follow-up 3 months.    AKASH GIBBONS,   [facility]    Subjective   oJse is a 27 year old, presenting for the following health issues:  Establish Care      HPI     26yo male here to establish care.  Recent admission 12/19/22 for suicide attempt - intentional overdose on his psych meds and stay overnight in cold car - found by police and EMS brought to [facility].  Transferred to [place] for medical mgmt - frostbite both feet, nontraumatic Rhabdo, incidental yet symptomatic positive strep throat, elevated LFTs likely MORENO.  Once medically cleared, transferred back to [facility] 1/3/23 - 1/9/23.      BMI 40, snores, tired, never checked for PLACIDO.  Recent labs show NOT immune to HepB, but did have vaccines x3 as a kid.  He has fatty liver disease (12/28/22 US at  [facility] showing Hepatomegaly with probable diffuse hepatic steatosis).    Sees his psychiatrist tomorrow for medication mgmt.  He feels he is stable since discharge, no SI/HI.  He is interested in getting off his Venlafaxine as he is scared of this medication since it is one of the meds he overdosed on.          Depression and Anxiety Follow-Up    How are you doing with your depression since your last visit? Improved Been in outpatient therapy and under med. management    How are you doing with your anxiety since your last visit?  Improved     Are you having other symptoms that might be associated with depression or anxiety? Yes:  Looking into Bipolar Disorder    Have you had a significant life event? No     Do you have any concerns with your use of alcohol or other drugs? No       PHQ 1/23/2023   PHQ-9 Total Score 9   Q9: Thoughts of better off dead/self-harm past 2 weeks Not at all     RENE-7 SCORE 1/23/2023   Total Score 3     Last PHQ-9 1/23/2023   1.  Little interest or pleasure in doing things 1   2.  Feeling down, depressed, or hopeless 1   3.  Trouble falling or staying asleep, or sleeping too much 3   4.  Feeling tired or having little energy 3   5.  Poor appetite or overeating 1   6.  Feeling bad about yourself 0   7.  Trouble concentrating 0   8.  Moving slowly or restless 0   Q9: Thoughts of better off dead/self-harm past 2 weeks 0   PHQ-9 Total Score 9   Difficulty at work, home, or with people Not difficult at all     RENE-7  1/23/2023   1. Feeling nervous, anxious, or on edge 1   2. Not being able to stop or control worrying 0   3. Worrying too much about different things 0   4. Trouble relaxing 0   5. Being so restless that it is hard to sit still 1   6. Becoming easily annoyed or irritable 1   7. Feeling afraid, as if something awful might happen 0   RENE-7 Total Score 3   If you checked any problems, how difficult have they made it for you to do your work, take care of things at home, or get along  with other people? Not difficult at all           How many servings of fruits and vegetables do you eat daily?  2-3    On average, how many sweetened beverages do you drink each day (Examples: soda, juice, sweet tea, etc.  Do NOT count diet or artificially sweetened beverages)?   1    How many days per week do you exercise enough to make your heart beat faster? 5    How many minutes a day do you exercise enough to make your heart beat faster? 30 - 60    How many days per week do you miss taking your medication? 0    Concern - Frostbite on feet  Onset: 12/19/2022  Description: Suicide attempt, passed out in car, car not running.  Intensity: moderate, severe  Progression of Symptoms:  improving  Accompanying Signs & Symptoms: Nerve pain, pins and needles bottom of foot, some burning  Previous history of similar problem: No  Precipitating factors:        Worsened by: Out in cold.  Alleviating factors:        Improved by: Walking or lotion  Therapies tried and outcome: None        Review of Systems   Constitutional: Negative for fever.   Respiratory: Negative for cough, shortness of breath and wheezing.    Cardiovascular: Negative for chest pain, palpitations and peripheral edema.   Gastrointestinal: Negative for abdominal pain.   Neurological: Negative for light-headedness.            Objective    /86   Pulse 89   Temp 98.5  F (36.9  C) (Tympanic)   Resp 16   Wt 134.6 kg (296 lb 11.2 oz)   SpO2 97%   BMI 40.24 kg/m    Body mass index is 40.24 kg/m .  Physical Exam  Constitutional:       General: He is not in acute distress.     Appearance: Normal appearance.   HENT:      Head: Normocephalic and atraumatic.   Eyes:      Conjunctiva/sclera: Conjunctivae normal.      Pupils: Pupils are equal, round, and reactive to light.   Neck:      Vascular: No carotid bruit.   Cardiovascular:      Rate and Rhythm: Normal rate and regular rhythm.      Heart sounds: Normal heart sounds. No murmur heard.  Pulmonary:       Effort: Pulmonary effort is normal.      Breath sounds: Normal breath sounds. No wheezing.   Abdominal:      General: Bowel sounds are normal.      Palpations: Abdomen is soft.      Tenderness: There is no abdominal tenderness.   Musculoskeletal:      Right lower leg: No edema.      Left lower leg: No edema.   Lymphadenopathy:      Cervical: No cervical adenopathy.   Skin:     Comments: Feet somewhat discolored (darker skin) but no wound and cap refill approx 2-3 sec in toes, DP palpable bilat   Neurological:      Mental Status: He is alert and oriented to person, place, and time.   Psychiatric:         Mood and Affect: Mood normal.         Behavior: Behavior normal.         Thought Content: Thought content normal.         Judgment: Judgment normal.

## 2023-01-25 LAB — DEPRECATED CALCIDIOL+CALCIFEROL SERPL-MC: 7 UG/L (ref 20–75)

## 2023-01-26 LAB
PHOSPHATE SERPL-MCNC: 3.4 MG/DL (ref 2.5–4.5)
PTH-INTACT SERPL-MCNC: 42 PG/ML (ref 15–65)

## 2023-01-30 ENCOUNTER — TELEPHONE (OUTPATIENT)
Dept: FAMILY MEDICINE | Facility: OTHER | Age: 28
End: 2023-01-30

## 2023-01-30 LAB
TTG IGA SER-ACNC: 0.9 U/ML
TTG IGG SER-ACNC: <0.6 U/ML
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
## 2023-01-30 NOTE — TELEPHONE ENCOUNTER
9:06 AM    Reason for Call: Phone Call    Description: pt called to schedule labs that he said the nurse from Dr. Willard office called him to schedule labs. There are no orders in for lab,  could not schedule lab.    Was an appointment offered for this call? No  If yes : Appointment type              Date    Preferred method for responding to this message: Telephone Call  What is your phone number ? [phone]    If we cannot reach you directly, may we leave a detailed response at the number you provided? Yes    Can this message wait until your PCP/provider returns, if available today? YES    Candi Smith

## 2023-01-31 ENCOUNTER — TELEPHONE (OUTPATIENT)
Dept: BEHAVIORAL HEALTH | Facility: CLINIC | Age: 28
End: 2023-01-31
Payer: COMMERCIAL

## 2023-01-31 ENCOUNTER — HOSPITAL ENCOUNTER (OUTPATIENT)
Dept: BEHAVIORAL HEALTH | Facility: HOSPITAL | Age: 28
Discharge: HOME | End: 2023-01-31
Attending: PSYCHIATRY & NEUROLOGY
Payer: COMMERCIAL

## 2023-01-31 PROCEDURE — H0035 MH PARTIAL HOSP TX UNDER 24H: HCPCS | Performed by: SOCIAL WORKER

## 2023-01-31 NOTE — PROGRESS NOTES
Group Therapy Progress Notes     Client Initial Individualized Goals for Treatment: *Will report on symptoms and identify skills to use to manage negative, self defeatnig thoughts and Will learn and practice 1-2 coping skills to help improve depressive symptoms.    Area of Treatment Focus:  Personal Safety    Therapeutic Interventions/Treatment Strategies:  Assist clients in establishing / strengthening support network  Assist to identify treatment goals  Assist with discharge planning  Engage in safety planning when indicated  Facilitate increased self awareness  Teach adaptive coping skills and communication skills  Use reality based supportive approach    Response to Treatment Strategies:  Accepted Feedback, Gave Feedback, Listened , Focused on Goals, Attentive and Accepted Support    Name of Groups:  Time Management - Time: 11:10-12:00    Description and Therapeutic Outcome:   Self development group - OT - Time Management  Time management group focuses on assisting clients to define self and increase positive self -regard.  Psychoeducation provided related to developing strategies/skills to support effective time management.  Clients will increase knowledge and awareness of time management, increase knowledge/awareness of skills/techniques/behaviors that support time management and when/how to utilize new time management strategies.  Clients will recognize that other peers share similar feelings, thoughts, and problems, and will expand their knowledge/skills through observing others self exploration and working through issues and personal development.  The goal is to help clients learn strategies to have success with time management ie. prioritizing, task analysis, limiting distractions, giving yourself enough time, and use of lists.  In OT group today, Jose presents as focused and engaged.  States that he is terrible at managing his time.  States that he takes on too much at work and works long hours and  "then on the weekends he is bored an has nothing to do.  Discussed the importance of setting boundaries at work and saying \"no\".         Is this a Weekly Review of the Progress on the Treatment Plan?  NO    Are Treatment Plan Goals being addressed?  YES      Are Treatment Plan Strategies to Address Goals Effective?  YES      Are there any current contracts in place?  YES             "

## 2023-01-31 NOTE — TELEPHONE ENCOUNTER
----- Message from MERCEDES Holbrook sent at 1/31/2023  8:23 AM CST -----  Regarding: [facility]  Good Morning!    Please add Jose to our [place] [facility] schedule starting today.  Thank you!    MERCEDES Minor

## 2023-01-31 NOTE — TREATMENT PLAN
Individualized Treatment Plan     Date of Plan: 2023    Name: Jose Alexander MRN: [id number]    : 1995    Programs:  [facility] ()     DSM-V Diagnoses: 296.33 (F33.2) Major Depressive Disorder, Recurrent Episode, Severe _ and With anxious distress;  Generalized Anxiety Disorder    DA Date: 23  Psychosocial & Contextual Factors: limited social support and mental health symptoms  WHODAS:   LOCUS: 22      Team Members Contributing to Plan:  Dr. Mino Thomas, [facility]  Cristina Francis, [facility]  Kat Engle, HATTIE    Client Strengths:  caring, creative, educated, empathetic, employed, goal-focused, good listener, has a previous history of therapy, insightful, intelligent, motivated, open to learning, open to suggestions / feedback, support of family, friends and providers, supportive, wants to learn, willing to ask questions, willing to relate to others and work history    Client Participation in Plan:  Agrees with plan   Received copy of treatment plan     Areas of Vulnerability:  Suicidal Ideation   Poor impulse control   Anxiety  Depressive symptoms   Physical/medical: see medical chart     Long-Term Goals:  Knowledge about illness and management of symptoms   Maintenance of personal safety     Abuse Prevention Plan:  Safe, therapeutic environment   Safety coping plan as needed   Education regarding illness and skill development     Discharge Criteria:  Satisfactory progress toward treatment goals   Improvement re: identified problems and symptoms   Ability to continue recovery at next level of service   Has a discharge plan in place   Regular attendance as scheduled           Areas of Treatment Focus            Area of Treatment Focus:   Personal Safety  Start Date:    23    Goal:  Target Date: 2/3/23 Status: Active  Client will notify staff when needing assistance to develop or implement a coping plan to manage suicidal or self injurious urges.  Client will  learn and practice 1 - 2 coping skills to help improve depressive symptoms      Progress:             Treatment Strategies:   Assist clients in establishing / strengthening support network  Assist to identify treatment goals  Assist with discharge planning  Assess / reassess for appropriate therapy program involvement, encourage participation in therapies  Facilitate increased self awareness  Teach adaptive coping skills and communication skills  Use reality based supportive approach    These services will include group therapy and OT group therapy daily and individual therapy and medications management as needed.                 Area of Treatment Focus:   Symptom Stabilization and Management  Start Date:    2/6/23    Goal:  Target Date: 2/10/23 Status: Active  Will Improve Mindfulness / Stay in the Here and Now Intentionally bringing your attention to something beautiful, pleasant, or interesting that is occurring or is present in your immediate environment or experience on a regular basis.      Progress:             Treatment Strategies:   Assist clients in establishing / strengthening support network  Assist with discharge planning  Assess / reassess level of potential for harm to self or others  Engage in safety planning when indicated  Facilitate increased self awareness  Provide feedback about social skills  Teach adaptive coping skills and communication skills  Use reality based supportive approach    These services will include group therapy and OT group therapy daily and individual therapy and medications management as needed.               Area of Treatment Focus:   Community Resources / Support and Discharge Planning  Start Date:    2/12/23    Goal:  Target Date: 2/24/23 Status: Active  Will increase effective use of support / increase ability to ask for help. Identify attitudes or beliefs about asking for help that interfere with your ability to ask for help and identify more helpful attitudes, Identify  providers you will continue to see for individual therapy, psychiatric care, group therapy, or other specialized providers. and Create a return to work, return to school, or return to daily routines plan.      Progress:             Treatment Strategies:   Assist clients in establishing / strengthening support network  Assist with discharge planning  Engage in safety planning when indicated  Facilitate increased self awareness  Provide feedback about social skills  Teach adaptive coping skills and communication skills  Use reality based supportive approach    These services will include group therapy and OT group therapy daily and individual therapy and medications management as needed.

## 2023-01-31 NOTE — PROGRESS NOTES
"Group Therapy Progress Notes     Client Initial Individualized Goals for Treatment:     Client will notify staff when needing assistance to develop or implement a coping plan to manage suicidal or self injurious urges.  Client will learn and practice 1 - 2 coping skills to help improve depressive symptoms     Area of Treatment Focus:  Personal Safety    Therapeutic Interventions/Treatment Strategies:  Assist clients in establishing / strengthening support network  Assist to identify treatment goals  Assist with discharge planning  Assess / reassess for appropriate therapy program involvement, encourage participation in therapies  Facilitate increased self awareness  Teach adaptive coping skills and communication skills  Use reality based supportive approach    Response to Treatment Strategies:  Accepted Feedback and Gave Feedback Listened    Name of Groups:  Shame - Time: 10-10:50; Coping with Shame 1-1:50    Description and Therapeutic Outcome:     During Shame group, Jose presented as interested and with a broad affect. He owns Shame as an area that is a challenge for him and valued the topic. Jose was able to differentiate between Guilt: A bad behavior and Shame: I am bad. He also identified his coping as : Withdrawal; turning the tables; lashing out; self put down;  avoidance through thrill seeking. \"I have to work on forgiving myself\". He also recognizes that \"I was the Lost Child\" in the Family Dynamics concept.     During the Coping with Shame group, Jose identified skills that he will implement to cope with Shame: the THE skill to not be the behavior as it is THE behavior and that he is not the behavior; Rewire to have healthy thinking; Watch my Words; Drop the Rope regarding the past; Self Compassion; Be active. He will continue to practice Awareness and implement the AAA - Awareness Acceptance Action as well as Present Moment. Jose presents as intentional regarding his wellness and remains " appropriate for PHP.     Is this a Weekly Review of the Progress on the Treatment Plan?  NO    Are Treatment Plan Goals being addressed?  YES    Are Treatment Plan Strategies to Address Goals Effective?  YES    Are there any current contracts in place?  YES

## 2023-02-01 ENCOUNTER — MYC REFILL (OUTPATIENT)
Dept: FAMILY MEDICINE | Facility: OTHER | Age: 28
End: 2023-02-01

## 2023-02-01 ENCOUNTER — HOSPITAL ENCOUNTER (OUTPATIENT)
Dept: BEHAVIORAL HEALTH | Facility: HOSPITAL | Age: 28
Discharge: HOME | End: 2023-02-01
Attending: PSYCHIATRY & NEUROLOGY
Payer: COMMERCIAL

## 2023-02-01 DIAGNOSIS — T33.822A FROSTBITE OF BOTH FEET, INITIAL ENCOUNTER: ICD-10-CM

## 2023-02-01 DIAGNOSIS — T33.821A FROSTBITE OF BOTH FEET, INITIAL ENCOUNTER: ICD-10-CM

## 2023-02-01 PROCEDURE — H0035 MH PARTIAL HOSP TX UNDER 24H: HCPCS | Performed by: SOCIAL WORKER

## 2023-02-01 ASSESSMENT — ENCOUNTER SYMPTOMS
ABDOMINAL PAIN: 0
COUGH: 0
PALPITATIONS: 0
WHEEZING: 0
SHORTNESS OF BREATH: 0
LIGHT-HEADEDNESS: 0
FEVER: 0

## 2023-02-01 NOTE — PROGRESS NOTES
Group Therapy Progress Notes     Client Initial Individualized Goals for Treatment: Client will notify staff when needing assistance to develop or implement a coping plan to manage suicidal or self injurious urges.  Client will learn and practice 1 - 2 coping skills to help improve depressive symptoms    Area of Treatment Focus:  Personal Safety    Therapeutic Interventions/Treatment Strategies:  Assist clients in establishing / strengthening support network  Assist to identify treatment goals  Assess / reassess level of potential for harm to self or others  Engage in safety planning when indicated  Facilitate increased self awareness  Teach adaptive coping skills and communication skills  Use reality based supportive approach    Response to Treatment Strategies:  Accepted Feedback, Gave Feedback, Listened , Focused on Goals, Attentive and Accepted Support    Name of Groups: Psychotherapy Wrap Up Group 2-3pm    Description and Therapeutic Outcome:   In psychotherapy wrapup group, Jose reviewed his takeaways from today.  He presented as engaged, gave good group feedback, and was intentional regarding his wellness.  He advised the discussion on shame and guilt hit hard; he stated he has always struggled with a lot of perfectionism and shame regarding not being good enough.  He discussed ongoing shame and inability to forgive oneself related to things in his past; however, he recognized through today's discussing remembering it was THE behavior and it does not define who he is as a whole.  He recognized working on drop the rope, Present Moment, and self compassion as if he is able to forgive himself he states his whole world view will change.  Tonight he plans on spending time with his grandparents.  No safety concerns identified - he remains appropriate for PHP.       Is this a Weekly Review of the Progress on the Treatment Plan?  NO    Are Treatment Plan Goals being addressed?  YES      Are Treatment Plan  Strategies to Address Goals Effective?  YES      Are there any current contracts in place?  YES

## 2023-02-01 NOTE — PROGRESS NOTES
"Group Therapy Progress Notes     Client Initial Individualized Goals for Treatment:     Client will notify staff when needing assistance to develop or implement a coping plan to manage suicidal or self injurious urges.  Client will learn and practice 1 - 2 coping skills to help improve depressive symptoms    Area of Treatment Focus:  Personal Safety    Therapeutic Interventions/Treatment Strategies:  Assist clients in establishing / strengthening support network  Assist to identify treatment goals  Assist with discharge planning  Assess / reassess for appropriate therapy program involvement, encourage participation in therapies  Engage in safety planning when indicated  Teach adaptive coping skills and communication skills  Use reality based supportive approach    Response to Treatment Strategies:  Accepted Feedback, Gave Feedback, Listened  and Focused on Goals    Name of Groups:  Self Disclosure - Time: 10-10:50; Gratitude 1-1:50    Description and Therapeutic Outcome:     During Self Disclosure group, Jose presented as social, interested, and easily participatory. He easily participated in the exercise relating to positives, strengths, and areas he wants to develop. Some of the strengths he identified were: spiritual; happy; loving; good boss; boundaries; hopeful; hardworking; caring, positive thinking; curious, reader. He accepted positives from peers and also shifted his 'surviving' to \"thriving\" and plans to work on 'confidence\". Jose was supportive of peers in adding to and supporting their positives.    During Gratitude group, Jose grasped the concept of saying \"Thank you\" rather than \"Sorry\". He identified addressing the sadness relating to death, rather than \"thanks for trusting me with this difficult emotion\". Jose continues to present as intentional regarding his wellness and remains appropriate for PHP.    Is this a Weekly Review of the Progress on the Treatment Plan?  NO    Are Treatment " Plan Goals being addressed?  YES      Are Treatment Plan Strategies to Address Goals Effective?  YES      Are there any current contracts in place?  YES

## 2023-02-01 NOTE — PROGRESS NOTES
"Group Therapy Progress Notes     Client Initial Individualized Goals for Treatment:     Client will notify staff when needing assistance to develop or implement a coping plan to manage suicidal or self injurious urges.  Client will learn and practice 1 - 2 coping skills to help improve depressive symptoms    Area of Treatment Focus:  Personal Safety    Therapeutic Interventions/Treatment Strategies:  Assist clients in establishing / strengthening support network  Assist to identify treatment goals  Assist with discharge planning  Assess / reassess for appropriate therapy program involvement, encourage participation in therapies  Facilitate increased self awareness  Use reality based supportive approach    Response to Treatment Strategies:  Accepted Feedback, Gave Feedback and Listened     Name of Groups:  Healthy Goals - Time: 11:10-12:00    Description and Therapeutic Outcome:   Healthy Goals  OT life skills group with focus on identification of appropriate goals for overall health and wellbeing.  Clients will create individualized goals, identify barriers to meeting those goals, and problem solve to make goals achievable.  Educated in creating a SMART goal (Specific, Measurable, Attainable, Relevant, Timely).  \"Setting healthy goals\" handout provided as well as individualized goal setting worksheet.   Jose notes that his goal is to learn to read sheet music.        Is this a Weekly Review of the Progress on the Treatment Plan?  NO    Are Treatment Plan Goals being addressed?  YES      Are Treatment Plan Strategies to Address Goals Effective?  YES      Are there any current contracts in place?  YES           "

## 2023-02-01 NOTE — PLAN OF CARE
MY COPING PLAN FOR SAFETY          The things that are most important to me and the reasons for living, are: my family and friends              My relapse warning signs are: self isolation, not being talkative, easily distracted, not reaching out  I will make my environment safer by: telling others my warning signs, be open, be assertive  When in crisis, I will cope in the following ways: (relaxation/self-soothing/distraction/activity) coping strategies, mindfulness, rewiring, self care  I will use my support system:   Personal Support: I can ask for reminders, support, them to stay with me  Trusted Friend/s:  William Broderick   Family Member/s : Mom, Dad, Stepmom, Stepbrother/wife                  Professional Support: I can ask for med changes, emergency appointments, help  Psychiatrist: Karena Long  Therapist: William Mendoza    Other/s: Veena Kaye     I can call a crisis line to know of options I can t see when I am this depressed, anxious or confused:     [place] Area:  [facility], Crisis stabilization [facility]- [phone]  [facility] Crisis Line: [phone]  [facility]: [phone]  Sexual Assault Program [facility]: [phone] or [phone]  [place] [facility] Battered Women's Program: [phone] Ext: [phone]       Calls answered Mon-Fri-8:00 am--4:30 pm    [place]:  [facility]: [phone]  [facility] first call for help: [phone]  [facility]:  [phone]    [place] Area:  Warm Line: [phone]       Calls answered Tuesday--Saturday 4:00 pm--10:00 pm  [facility] Crisis Line - [phone]  [place] Crisis Stabil[facility] [phone]    [place] Statewide:  [place] Crisis and Referral Services: 4-414-131-4338  National Suicide Prevention Lifeline: 3-724-836-TALK (0228)   - anu5ybqf- Text  Life  to 35270  First Call for Help: 2-1-1  [facility] Helpline- 9-066-PNUY-HELP       I will attend my Treatment Program and talk to my one of my therapists: KAYLEE     AA  I agree that I will report any thoughts, impulses or plans of suicide, homicide, SIB or substance relapse as they occur, during the treatment day.   AA   I agree that I will not act on the above symptoms, but will follow the above plan instead.    If nothing above is working for me, I can go to:   Emergency Care Department - Located at the [address] [address]   You can reach us directly by calling [phone] or call the [facility] at [phone] or [phone].

## 2023-02-01 NOTE — TELEPHONE ENCOUNTER
diclofenac (VOLTAREN) 1 % topical gel     Last Written Prescription Date:  1-9-23  Last Fill Quantity: 350G,   # refills: 0  Last Office Visit: 1-23-23  Future Office visit:       Routing refill request to provider for review/approval because:

## 2023-02-01 NOTE — TELEPHONE ENCOUNTER
Called patient with the providers answer as below and he states understanding      Extra labs were added onto the blood they already had... Not sure why they called him to come in again.  Basically he just has the low Vit D, I had sent in Rx for supplement, follow-up 3 months.      You  Hubert Willard, DO 3 hours ago (10:49 AM)     MR  Labs drawn 1-23-23, anything else needed?

## 2023-02-02 ENCOUNTER — HOSPITAL ENCOUNTER (OUTPATIENT)
Dept: BEHAVIORAL HEALTH | Facility: HOSPITAL | Age: 28
Discharge: HOME | End: 2023-02-02
Attending: PSYCHIATRY & NEUROLOGY
Payer: COMMERCIAL

## 2023-02-02 DIAGNOSIS — F33.2 MAJOR DEPRESSIVE DISORDER, RECURRENT SEVERE WITHOUT PSYCHOTIC FEATURES (H): Primary | ICD-10-CM

## 2023-02-02 PROCEDURE — H0035 MH PARTIAL HOSP TX UNDER 24H: HCPCS | Performed by: SOCIAL WORKER

## 2023-02-02 RX ORDER — VENLAFAXINE HYDROCHLORIDE 75 MG/1
75 CAPSULE, EXTENDED RELEASE ORAL DAILY
Qty: 30 CAPSULE | Refills: 0 | Status: SHIPPED | OUTPATIENT
Start: 2023-02-02 | End: 2023-02-20

## 2023-02-02 NOTE — PROGRESS NOTES
"Group Therapy Progress Notes     Client Initial Individualized Goals for Treatment:     Client will notify staff when needing assistance to develop or implement a coping plan to manage suicidal or self injurious urges.  Client will learn and practice 1 - 2 coping skills to help improve depressive symptoms    Area of Treatment Focus:  Personal Safety    Therapeutic Interventions/Treatment Strategies:  Assist clients in establishing / strengthening support network  Assist to identify treatment goals  Assist with discharge planning  Assess / reassess for appropriate therapy program involvement, encourage participation in therapies  Engage in safety planning when indicated  Teach adaptive coping skills and communication skills  Use reality based supportive approach    Response to Treatment Strategies:  Accepted Feedback, Gave Feedback, Listened  and Focused on Goals    Name of Groups:  Shame - Time: 10-10:50; Perfectionism 1-1:50    Description and Therapeutic Outcome:     During Shame group, Jose presented as bright, attentive and with a broad affect. He eagerly brought out other points as a follow up from the Shame topic yesterday. He focused specifically on   'measuring up' to others. This especially related to working on measuring up to his intelligent brother \"but my body paid the price\" due to excessive sports. He also accepts Shame being the thief of intimacy. Jose has a positive grasp on the Shame concept and will focus on : Drop the Rope; Letting Go; 3 C's; Present Moment; the THE skill; Know My Truth.    During Perfectionism group, Jose identified having Low Self Esteem. He is also aware that \"Pain comes from the difference between what is happening and what I think ought to be happening\". Jose will implement Self Compassion and identified that he will focus on the tools he now has with the skills; use his supports : Step mom and best friend; acceptance; self compassion. Jose continues to " present as intentional regarding his wellness and remains appropriate for PHP.    Is this a Weekly Review of the Progress on the Treatment Plan?  NO    Are Treatment Plan Goals being addressed?  YES      Are Treatment Plan Strategies to Address Goals Effective?  YES      Are there any current contracts in place?  YES

## 2023-02-02 NOTE — PROGRESS NOTES
"Group Therapy Progress Notes     Client Initial Individualized Goals for Treatment: Client will notify staff when needing assistance to develop or implement a coping plan to manage suicidal or self injurious urges.  Client will learn and practice 1 - 2 coping skills to help improve depressive symptoms    Area of Treatment Focus:  Personal Safety    Therapeutic Interventions/Treatment Strategies:  Assist clients in establishing / strengthening support network  Assist to identify treatment goals  Assist with discharge planning  Assess / reassess for appropriate therapy program involvement, encourage participation in therapies  Engage in safety planning when indicated  Teach adaptive coping skills and communication skills  Use reality based supportive approach    Response to Treatment Strategies:  Accepted Feedback, Gave Feedback, Listened , Focused on Goals, Attentive and Accepted Support    Name of Groups:  Money Management - Time: 11:10-12:00    Description and Therapeutic Outcome:   OT - Life Skills - Money Management  In this group, clients learn the basics of money management as well as learn about their money management style (spenders, savers, amassers, conscientious manager and risk takers).  Will discuss what their style means for them and discuss basic skills they can use to improve their habits if needed.  Education and handout also given on savings tips, creating a budget and shopping tips.  In OT group today, Jose is focused and engaged.  States that he pays his bills but often overspends and buys things that he feels are unnecessary.  \"I don't ever save for the future\".        Is this a Weekly Review of the Progress on the Treatment Plan?  NO    Are Treatment Plan Goals being addressed?  YES      Are Treatment Plan Strategies to Address Goals Effective?  YES      Are there any current contracts in place?  YES             "

## 2023-02-02 NOTE — PROGRESS NOTES
Group Therapy Progress Notes     Client Initial Individualized Goals for Treatment: Client will notify staff when needing assistance to develop or implement a coping plan to manage suicidal or self injurious urges.  Client will learn and practice 1 - 2 coping skills to help improve depressive symptoms.    Area of Treatment Focus:  Personal Safety    Therapeutic Interventions/Treatment Strategies:  Assist clients in establishing / strengthening support network  Assist to identify treatment goals  Facilitate increased self awareness  Teach adaptive coping skills and communication skills  Use reality based supportive approach    Response to Treatment Strategies:  Accepted Feedback, Gave Feedback, Focused on Goals and Accepted Support    Name of Groups: Psychotherapy Wrap Up Group 2-3pm    Description and Therapeutic Outcome:   In psychotherapy wrap up group, Jose reviewed his takeaways from today.  He presented with good humor, participatory, and focused on his wellness/ providing good group feedback. He identified his main takeaway from today was the importance of self-love and self-compassion. He advised the activity of working on forming a more positive identity helped identify how far he has come and the positives he brings to the table.  Through the goal group in OT today, he recognized continuing to work on fulfilling his dreams and will start by learning sheet music.  This was a good group for Jose and he continues to work on self compassion /forgiveness regarding past events.  Tonight he has a doctor's appt and then is meeting with his [employer] housing worker.  No safety concerns identified - he remains appropriate for PHP.    Is this a Weekly Review of the Progress on the Treatment Plan?  NO    Are Treatment Plan Goals being addressed?  YES      Are Treatment Plan Strategies to Address Goals Effective?  YES      Are there any current contracts in place?  YES

## 2023-02-03 ENCOUNTER — HOSPITAL ENCOUNTER (OUTPATIENT)
Dept: BEHAVIORAL HEALTH | Facility: HOSPITAL | Age: 28
Discharge: HOME | End: 2023-02-03
Attending: PSYCHIATRY & NEUROLOGY
Payer: COMMERCIAL

## 2023-02-03 PROCEDURE — H0035 MH PARTIAL HOSP TX UNDER 24H: HCPCS | Performed by: SOCIAL WORKER

## 2023-02-03 NOTE — PROGRESS NOTES
"Group Therapy Progress Notes     Client Initial Individualized Goals for Treatment:     Client will notify staff when needing assistance to develop or implement a coping plan to manage suicidal or self injurious urges.  Client will learn and practice 1 - 2 coping skills to help improve depressive symptoms.    Area of Treatment Focus:  Personal Safety    Therapeutic Interventions/Treatment Strategies:  Assist clients in establishing / strengthening support network  Assist to identify treatment goals  Assist with discharge planning  Assess / reassess for appropriate therapy program involvement, encourage participation in therapies  Engage in safety planning when indicated  Teach adaptive coping skills and communication skills  Use reality based supportive approach    Response to Treatment Strategies:  Accepted Feedback, Gave Feedback, Listened  and Focused on Goals    Name of Groups:  3 Cycles of 21 Days - Time: 10-10:50; Self Awareness 1-1:50    Description and Therapeutic Outcome:     During 3 Cycles of 21 Days group, Jose presented as alert, participatory and provided positive feedback to the group process, as well as to a graduating peer. He continues to work on Awareness and changing his \"I'm sorry to Thank you\". He grasps the concept of the 3 Cycles - beginning with an event and the outcome being a trauma due to not interrupting the process.    During the Self Awareness group, Jose continued to present as positive and engaged. He valued the concept of \"The trauma was real then, but now, it is only an illusion, carried through time via a train of thought\". Jose is also intent on rewiring negatives with positives: worthless - I am loved; ugly - I have great features. He will also practice: the THE skill; Lower Expectations; Mindfulness; Awareness. Jose presents as intentional regarding his wellness and remains appropriate for PHP.    Is this a Weekly Review of the Progress on the Treatment " Plan?  NO    Are Treatment Plan Goals being addressed?  YES      Are Treatment Plan Strategies to Address Goals Effective?  YES      Are there any current contracts in place?  YES

## 2023-02-03 NOTE — PROGRESS NOTES
Group Therapy Progress Notes     Client Initial Individualized Goals for Treatment: Client will notify staff when needing assistance to develop or implement a coping plan to manage suicidal or self injurious urges.  Client will learn and practice 1 - 2 coping skills to help improve depressive symptoms    Area of Treatment Focus:  Personal Safety    Therapeutic Interventions/Treatment Strategies:  Assist clients in establishing / strengthening support network  Assist to identify treatment goals  Assist with discharge planning  Assess / reassess for appropriate therapy program involvement, encourage participation in therapies  Engage in safety planning when indicated  Teach adaptive coping skills and communication skills  Use reality based supportive approach    Response to Treatment Strategies:  Accepted Feedback, Gave Feedback, Listened , Focused on Goals, Attentive and Accepted Support    Name of Groups:  Stress Management - Time: 11:10-12:00    Description and Therapeutic Outcome:   Education provided on sensory coping skills and how to identify when needing to alert up vs. calm down.  Scenarios provided and sensory coping skills identified to utilize in times of distress.  Education on emotional signs, physical signs, and behavioral signs when recognizing states of arousal to guide the need for calming vs alerting sensory input. Pt completed sensory toolbox kit: items offered include weighted tool, fidgets, aromatherapy, mind engaging activities, stress balls, crayons, and bubbles.  Verbalizes/demos understanding of sensory toolbox and what else could be added at home.  In OT group today, Jose is quiet but engaged.  Jose made a sensory kit but declined making a weighted item.         Is this a Weekly Review of the Progress on the Treatment Plan?  NO    Are Treatment Plan Goals being addressed?  YES      Are Treatment Plan Strategies to Address Goals Effective?  YES      Are there any current contracts in  place?  YES

## 2023-02-06 ENCOUNTER — HOSPITAL ENCOUNTER (OUTPATIENT)
Dept: BEHAVIORAL HEALTH | Facility: HOSPITAL | Age: 28
Discharge: HOME | End: 2023-02-06
Attending: PSYCHIATRY & NEUROLOGY
Payer: COMMERCIAL

## 2023-02-06 PROCEDURE — H0035 MH PARTIAL HOSP TX UNDER 24H: HCPCS | Performed by: SOCIAL WORKER

## 2023-02-06 NOTE — PROGRESS NOTES
"Group Therapy Progress Notes     Client Initial Individualized Goals for Treatment:     Will Improve Mindfulness / Stay in the Here and Now Intentionally bringing your attention to something beautiful, pleasant, or interesting that is occurring or is present in your immediate environment or experience on a regular basis.    Area of Treatment Focus:  Symptom Stabilization and Management    Therapeutic Interventions/Treatment Strategies:  Assist clients in establishing / strengthening support network  Assist with discharge planning  Assess / reassess for appropriate therapy program involvement, encourage participation in therapies  Engage in safety planning when indicated  Facilitate increased self awareness  Provide feedback about social skills  Teach adaptive coping skills and communication skills  Use reality based supportive approach    Response to Treatment Strategies:  Accepted Feedback, Gave Feedback, Listened  and Focused on Goals    Name of Groups:  Radical Acceptance - Time: 10-10:50; 1-1:50    Description and Therapeutic Outcome:     During Radical Acceptance group, Jose presented with a broad affect and was participatory. He identified having \"resentment\" toward his dad because he was the result of his dad's affair. \"I do accept the affair as I would otherwise not be here.\" He acknowledges Letting Go will take time. He will also implement the 3 C's; AAA- Awareness, Acceptance, action; Opposite Action/Opposite Emotion.     During the follow up group, Jose provided positive feedback to an emotional peer. He also engaged in the group process, though found the topic challenging. He will work on \"I AM.. and be mindful of what words follow his I Am \"as that is what I am going to be\". He will also focus on Know My Truth; Gratitude; accept life in general. Jose continues to present as intentional regarding his wellness and remains appropriate for PHP.    Is this a Weekly Review of the Progress on the " Treatment Plan?  YES    Are Treatment Plan Goals being addressed?  YES      Are Treatment Plan Strategies to Address Goals Effective?  YES      Are there any current contracts in place?  YES

## 2023-02-06 NOTE — PROGRESS NOTES
Group Therapy Progress Notes     Client Initial Individualized Goals for Treatment:     Will Improve Mindfulness / Stay in the Here and Now Intentionally bringing your attention to something beautiful, pleasant, or interesting that is occurring or is present in your immediate environment or experience on a regular basis.    Area of Treatment Focus:  Symptom Stabilization and Management    Therapeutic Interventions/Treatment Strategies:  Assist clients in establishing / strengthening support network  Assist with discharge planning  Assess / reassess for appropriate therapy program involvement, encourage participation in therapies  Engage in safety planning when indicated  Facilitate increased self awareness  Provide feedback about social skills  Teach adaptive coping skills and communication skills  Use reality based supportive approach    Response to Treatment Strategies:  Accepted Feedback, Gave Feedback, Listened , Focused on Goals and Attentive    Name of Groups:  Hygeine/Self Care - Time: 11:10-12:00    Description and Therapeutic Outcome:   OT group - Hygiene and Self Care  This group focus is on self care and proper hygiene.  Clients will explore self care topics such as diet, exercise, spirituality, overall health management, self improvement, hygiene, self regulation, leisure, sleep, and sobriety; as well as learn how those aspects can be addressed for overall health and wellness.  Clients will have the opportunity to assess their current hygiene routines and habits, and make changes for improvement if necessary.    Jose notes that he has a wellness sheet.        Is this a Weekly Review of the Progress on the Treatment Plan?  YES    Are Treatment Plan Goals being addressed?  YES      Are Treatment Plan Strategies to Address Goals Effective?  YES      Are there any current contracts in place?  YES

## 2023-02-07 ENCOUNTER — HOSPITAL ENCOUNTER (OUTPATIENT)
Dept: BEHAVIORAL HEALTH | Facility: HOSPITAL | Age: 28
Discharge: HOME | End: 2023-02-07
Attending: PSYCHIATRY & NEUROLOGY
Payer: COMMERCIAL

## 2023-02-07 NOTE — PROGRESS NOTES
Group Therapy Progress Notes     Client Initial Individualized Goals for Treatment:   Will Improve Mindfulness / Stay in the Here and Now Intentionally bringing your attention to something beautiful, pleasant, or interesting that is occurring or is present in your immediate environment or experience on a regular basis.    Area of Treatment Focus:  Symptom Stabilization and Management    Therapeutic Interventions/Treatment Strategies:  Assist clients in establishing / strengthening support network  Assist with discharge planning  Assess / reassess for appropriate therapy program involvement, encourage participation in therapies  Engage in safety planning when indicated  Facilitate increased self awareness  Provide feedback about social skills  Teach adaptive coping skills and communication skills  Use reality based supportive approach    Response to Treatment Strategies:  Accepted Feedback, Gave Feedback, Listened , Focused on Goals and Attentive    Name of Groups:  Warning Signs - Time: 11:10-12:00    Description and Therapeutic Outcome:   OT group - Recognizing warning signs of relapse - aftercare planning  This group provides psychoeducation related to developing strategies/skills to support effective identification of warning signs as well as identifying strategies to prevent MH relapse. Clients will review what can be done day-to-day to cope and manage symptoms as well as create a modified action plan. Jose discussed a main warning sign of relapse for him is isolation. He also went over his Wellness Recovery Action Plan (WRAP) with the group.    Is this a Weekly Review of the Progress on the Treatment Plan?  NO    Are Treatment Plan Goals being addressed?  YES    Are Treatment Plan Strategies to Address Goals Effective?  YES    Are there any current contracts in place?  YES

## 2023-02-07 NOTE — TREATMENT PLAN
Individualized Treatment Plan     Date of Plan: 2023    Name: Jose Alexander MRN: [id number]    : 1995    Programs:  [facility] ()     DSM-V Diagnoses: 296.33 (F33.2) Major Depressive Disorder, Recurrent Episode, Severe _ and With anxious distress;  Generalized Anxiety Disorder    DA Date: 23  Psychosocial & Contextual Factors: limited social support and mental health symptoms  WHODAS:   LOCUS: 22      Team Members Contributing to Plan:  Dr. Mino Thomas, [facility]  Cristina Francis, [facility]  Kat Engle, HATTIE    Client Strengths:  caring, creative, educated, empathetic, employed, goal-focused, good listener, has a previous history of therapy, insightful, intelligent, motivated, open to learning, open to suggestions / feedback, support of family, friends and providers, supportive, wants to learn, willing to ask questions, willing to relate to others and work history    Client Participation in Plan:  Agrees with plan   Received copy of treatment plan     Areas of Vulnerability:  Suicidal Ideation   Poor impulse control   Anxiety  Depressive symptoms   Physical/medical: see medical chart     Long-Term Goals:  Knowledge about illness and management of symptoms   Maintenance of personal safety     Abuse Prevention Plan:  Safe, therapeutic environment   Safety coping plan as needed   Education regarding illness and skill development     Discharge Criteria:  Satisfactory progress toward treatment goals   Improvement re: identified problems and symptoms   Ability to continue recovery at next level of service   Has a discharge plan in place   Regular attendance as scheduled           Areas of Treatment Focus            Area of Treatment Focus:   Personal Safety  Start Date:    23    Goal:  Target Date: 2/3/23 Status: Active  Client will notify staff when needing assistance to develop or implement a coping plan to manage suicidal or self injurious urges.  Client will  "learn and practice 1 - 2 coping skills to help improve depressive symptoms      Progress:    Jose had a great first week of PHP programming, he has been engaged and motivated to learn the content of what is being taught in the groups.  He has been a strong participant and always offers to read and is quick to engaged in group discussion.  Jose has been a strong addition to this group and offers much support and compassion to his peers.     During Shame group, Jose owns Shame as an area that is a challenge for him and valued the topic. Jose was able to differentiate between Guilt: A bad behavior and Shame: I am bad. He also identified his coping as : Withdrawal; turning the tables; lashing out; self put down;  avoidance through thrill seeking. \"I have to work on forgiving myself\". He also recognizes that \"I was the Lost Child\" in the Family Dynamics concept. In the Coping with Shame group, Jose identified skills that he will implement to cope with Shame: the THE skill to not be the behavior as it is THE behavior and that he is not the behavior; Rewire to have healthy thinking; Watch my Words; Drop the Rope regarding the past; Self Compassion; Be active. He will continue to practice Awareness and implement the AAA - Awareness Acceptance Action as well as Present Moment.     In Self Disclosure group, Jose easily participated in the exercise relating to positives, strengths, and areas he wants to develop. Some of the strengths he identified were: spiritual; happy; loving; good boss; boundaries; hopeful; hardworking; caring, positive thinking; curious, reader. He accepted positives from peers and also shifted his 'surviving' to \"thriving\" and plans to work on 'confidence\". Jose was supportive of peers in adding to and supporting their positives.  During Gratitude group, Jose grasped the concept of saying \"Thank you\" rather than \"Sorry\". He identified addressing the sadness relating to death, " "rather than \"thanks for trusting me with this difficult emotion\".     During Shame group, Jose brought out other points as a follow up from the Shame topic yesterday. He focused specifically on 'measuring up' to others. This especially related to working on measuring up to his  brother \"but my body paid the price\" due to excessive sports. He also accepts Shame being the thief of intimacy. Jose has a positive grasp on the Shame concept and will focus on : Drop the Rope; Letting Go; 3 C's; Present Moment; the THE skill; Know My Truth.  In Perfectionism group, Jose identified having Low Self Esteem. He is also aware that \"Pain comes from the difference between what is happening and what I think ought to be happening\". Jose will implement Self Compassion and identified that he will focus on the tools he now has with the skills; use his supports : Step mom and best friend; acceptance; self compassion.     In 3 Cycles of 21 Days group, Jose presented as alert, participatory and provided positive feedback to the group process, as well as to a graduating peer. He continues to work on Awareness and changing his \"I'm sorry to Thank you\". He grasps the concept of the 3 Cycles - beginning with an event and the outcome being a trauma due to not interrupting the process.  During the Self Awareness group, Jose continued to present as positive and engaged. He valued the concept of \"The trauma was real then, but now, it is only an illusion, carried through time via a train of thought\". Jose is also intent on rewiring negatives with positives: worthless - I am loved; ugly - I have great features. He will also practice: the THE skill; Lower Expectations; Mindfulness; Awareness. Jose presents as intentional regarding his wellness and remains appropriate for PHP.    In the next week we will focus on symptom management and introduce mindfulness skills and grounding techniques.  Good first week for Jose!        "    Treatment Strategies:   Assist clients in establishing / strengthening support network  Assist to identify treatment goals  Assist with discharge planning  Assess / reassess for appropriate therapy program involvement, encourage participation in therapies  Facilitate increased self awareness  Teach adaptive coping skills and communication skills  Use reality based supportive approach    These services will include group therapy and OT group therapy daily and individual therapy and medications management as needed.                 Area of Treatment Focus:   Symptom Stabilization and Management  Start Date:    2/6/23    Goal:  Target Date: 2/10/23 Status: Active  Will Improve Mindfulness / Stay in the Here and Now Intentionally bringing your attention to something beautiful, pleasant, or interesting that is occurring or is present in your immediate environment or experience on a regular basis.      Progress:             Treatment Strategies:   Assist clients in establishing / strengthening support network  Assist with discharge planning  Assess / reassess level of potential for harm to self or others  Engage in safety planning when indicated  Facilitate increased self awareness  Provide feedback about social skills  Teach adaptive coping skills and communication skills  Use reality based supportive approach    These services will include group therapy and OT group therapy daily and individual therapy and medications management as needed.               Area of Treatment Focus:   Community Resources / Support and Discharge Planning  Start Date:    2/12/23    Goal:  Target Date: 2/24/23 Status: Active  Will increase effective use of support / increase ability to ask for help. Identify attitudes or beliefs about asking for help that interfere with your ability to ask for help and identify more helpful attitudes, Identify providers you will continue to see for individual therapy, psychiatric care, group therapy, or  other specialized providers. and Create a return to work, return to school, or return to daily routines plan.      Progress:             Treatment Strategies:   Assist clients in establishing / strengthening support network  Assist with discharge planning  Engage in safety planning when indicated  Facilitate increased self awareness  Provide feedback about social skills  Teach adaptive coping skills and communication skills  Use reality based supportive approach    These services will include group therapy and OT group therapy daily and individual therapy and medications management as needed.

## 2023-02-07 NOTE — PROGRESS NOTES
"Group Therapy Progress Notes     Client Initial Individualized Goals for Treatment:     Will Improve Mindfulness / Stay in the Here and Now Intentionally bringing your attention to something beautiful, pleasant, or interesting that is occurring or is present in your immediate environment or experience on a regular basis.    Area of Treatment Focus:  Symptom Stabilization and Management    Therapeutic Interventions/Treatment Strategies:  Assist clients in establishing / strengthening support network  Assist with discharge planning  Assess / reassess for appropriate therapy program involvement, encourage participation in therapies  Engage in safety planning when indicated  Facilitate increased self awareness  Provide feedback about social skills  Teach adaptive coping skills and communication skills  Use reality based supportive approach    Response to Treatment Strategies:  Accepted Feedback, Gave Feedback, Listened  and Focused on Goals    Name of Groups:  Relationships - Time: 10-10:50; 1-1:50    Description and Therapeutic Outcome:     During Relationships group, Jose presented as alert, with a broad affect and easily participatory. He regularly provides positive feedback to the group process, as well being supportive of peers. He identified skills that will support relationship: Drop the Rope - this he is working on in regard to his bio dad. He will also implement : Drop the Rope; TFC - Think Feel Choose; Validate; 3 C's; Common Humanity, Just Like Me and Know My Truth.    During the follow up group, which related to the Hello Emotion skill, Jose grasped the concept. He will incorporate : Feelings just are; AAA- Awareness; Acceptance, Action; as well a the THE skill and Present Moment. A skill that was a big take-away from him today was \"Check your soil\". He recognizes that unhealthy soil will affect the outcome of the \"crop\". Jose continues to present as intentional regarding his wellness and remains " appropriate for PHP.    Is this a Weekly Review of the Progress on the Treatment Plan?  NO    Are Treatment Plan Goals being addressed?  YES      Are Treatment Plan Strategies to Address Goals Effective?  YES      Are there any current contracts in place?  YES

## 2023-02-08 ENCOUNTER — HOSPITAL ENCOUNTER (OUTPATIENT)
Dept: BEHAVIORAL HEALTH | Facility: HOSPITAL | Age: 28
Discharge: HOME | End: 2023-02-08
Attending: PSYCHIATRY & NEUROLOGY
Payer: COMMERCIAL

## 2023-02-08 PROCEDURE — H0035 MH PARTIAL HOSP TX UNDER 24H: HCPCS | Performed by: SOCIAL WORKER

## 2023-02-08 NOTE — PROGRESS NOTES
"Group Therapy Progress Notes     Client Initial Individualized Goals for Treatment:     Will Improve Mindfulness / Stay in the Here and Now Intentionally bringing your attention to something beautiful, pleasant, or interesting that is occurring or is present in your immediate environment or experience on a regular basis.    Area of Treatment Focus:  Symptom Stabilization and Management    Therapeutic Interventions/Treatment Strategies:  Assist clients in establishing / strengthening support network  Assist with discharge planning  Assess / reassess for appropriate therapy program involvement, encourage participation in therapies  Engage in safety planning when indicated  Facilitate increased self awareness  Provide feedback about social skills  Teach adaptive coping skills and communication skills  Use reality based supportive approach    Response to Treatment Strategies:  Accepted Feedback, Gave Feedback, Listened  and Focused on Goals    Name of Groups:  Psychotherapy wrap up group - 2-3    Description and Therapeutic Outcome:   In psychotherapy wrap up group, Jose reviewed his take away from today.  Initially introduced himself as this is my first group with these patients.  He reports he is hoping to move through past guilt and shame and learn to move on with skills.  Reports using the \"the\" skill, AAA, Drop the Rope, and present moment. In program today, reviewed TFC, Hello Emotion and the 5:3:2 skills as well as the WRAP packet.  He also mentioned a new skill of \"check your soil\" with the idea of making sure you have the ability to group - add or subtract things that are toxic.  He reports learning that feelings are just feelings - not good or bad and not judging what feelings come up.  Jose engaged very well - gave helpful and supportive feedback to his peers as well as comfortably engaged in the group reviewing the day.  Reviewed evening plans - no safety issues noted - he remains appropriate for " PHP.      Is this a Weekly Review of the Progress on the Treatment Plan?  NO    Are Treatment Plan Goals being addressed?  YES      Are Treatment Plan Strategies to Address Goals Effective?  YES      Are there any current contracts in place?  YES

## 2023-02-08 NOTE — PROGRESS NOTES
"Group Therapy Progress Notes     Client Initial Individualized Goals for Treatment:     Will Improve Mindfulness / Stay in the Here and Now Intentionally bringing your attention to something beautiful, pleasant, or interesting that is occurring or is present in your immediate environment or experience on a regular basis.    Area of Treatment Focus:  Symptom Stabilization and Management    Therapeutic Interventions/Treatment Strategies:  Assist clients in establishing / strengthening support network  Assist with discharge planning  Assess / reassess for appropriate therapy program involvement, encourage participation in therapies  Engage in safety planning when indicated  Facilitate increased self awareness  Provide feedback about social skills  Teach adaptive coping skills and communication skills  Use reality based supportive approach    Response to Treatment Strategies:  Accepted Feedback, Gave Feedback, Listened , Focused on Goals and Attentive    Name of Groups:  Emotion Regulation - Time: 11:10-12:00    Description and Therapeutic Outcome:   OT group- Emotion Management  OT group today was focused on emotion regulation.  Clients are given handouts outlining 10 emotion regulation skills.  In group, specific emotion regulation skills are discussed and practiced together.  Handout \"Checking your basic needs to aide in self/emotion regulation\" was issued and discussed.  Encouraged clients to identify what basic needs they are doing well with as well as set a goal to work on improving 1-2 basic needs.    Patient notes that he struggles with things from his childhood which impacts how he self regulates discussed the 5 why's and proceeded down this with him. Came to the conclusion that trusting others is a difficult thing for him.        Is this a Weekly Review of the Progress on the Treatment Plan?  NO    Are Treatment Plan Goals being addressed?  YES      Are Treatment Plan Strategies to Address Goals " Effective?  YES      Are there any current contracts in place?  YES

## 2023-02-08 NOTE — PROGRESS NOTES
Group Therapy Progress Notes     Client Initial Individualized Goals for Treatment:     Will Improve Mindfulness / Stay in the Here and Now Intentionally bringing your attention to something beautiful, pleasant, or interesting that is occurring or is present in your immediate environment or experience on a regular basis.    Area of Treatment Focus:  Symptom Stabilization and Management    Therapeutic Interventions/Treatment Strategies:  Assist clients in establishing / strengthening support network  Assist with discharge planning  Assess / reassess for appropriate therapy program involvement, encourage participation in therapies  Engage in safety planning when indicated  Facilitate increased self awareness  Provide feedback about social skills  Teach adaptive coping skills and communication skills  Use reality based supportive approach    Response to Treatment Strategies:  Accepted Feedback, Gave Feedback, Listened  and Focused on Goals    Name of Groups:  Gender - Time: 10-10:50; Love Languages 1-1:50    Description and Therapeutic Outcome:     During Gender group, which entailed Microaggressions and stereotypes, Jose presented as alert, interested and participatory. He grasped the concept and was able to provide insightful feedback to the group process.     During the Love Languages group, Jose identified his Love Language as Quality Time, and secondary as Physical Touch. He valued the 1:1 time with his mom yesterday when he intentionally practiced the 3-minute skill. Jose continues to present as intentional regarding his wellness and remains appropriate for PHP.    Is this a Weekly Review of the Progress on the Treatment Plan?  NO    Are Treatment Plan Goals being addressed?  YES      Are Treatment Plan Strategies to Address Goals Effective?  YES      Are there any current contracts in place?  YES

## 2023-02-09 ENCOUNTER — HOSPITAL ENCOUNTER (OUTPATIENT)
Dept: BEHAVIORAL HEALTH | Facility: HOSPITAL | Age: 28
Discharge: HOME | End: 2023-02-09
Attending: PSYCHIATRY & NEUROLOGY
Payer: COMMERCIAL

## 2023-02-09 PROCEDURE — H0035 MH PARTIAL HOSP TX UNDER 24H: HCPCS | Performed by: SOCIAL WORKER

## 2023-02-09 NOTE — PROGRESS NOTES
Group Therapy Progress Notes     Client Initial Individualized Goals for Treatment:     Will Improve Mindfulness / Stay in the Here and Now Intentionally bringing your attention to something beautiful, pleasant, or interesting that is occurring or is present in your immediate environment or experience on a regular basis.    Area of Treatment Focus:  Symptom Stabilization and Management    Therapeutic Interventions/Treatment Strategies:  Assist clients in establishing / strengthening support network  Assist with discharge planning  Assess / reassess for appropriate therapy program involvement, encourage participation in therapies  Engage in safety planning when indicated  Facilitate increased self awareness  Provide feedback about social skills  Teach adaptive coping skills and communication skills  Use reality based supportive approach    Response to Treatment Strategies:  Accepted Feedback, Gave Feedback, Listened  and Focused on Goals    Name of Groups:  Memory - Time: 11:10-12:00    Description and Therapeutic Outcome:   OT group on memory discusses and aids group participants to identify what type of learner they are I.e. Right vs left brained and Kinesthetic, auditory, or visual. Which allows to gain insight into strengths that they possess in order to be successful with everyday challenges and to manage tasks that require memory. For example: appointments, taking medication, compensatory techniques involving time management etc. Provided handouts on tips to aid your memory as well as ways to minimize memory loss.   Jose notes that his strongest learning style is visual with auditory and kinesthetic tied for 2nd.        Is this a Weekly Review of the Progress on the Treatment Plan?  NO    Are Treatment Plan Goals being addressed?  YES      Are Treatment Plan Strategies to Address Goals Effective?  YES      Are there any current contracts in place?  YES

## 2023-02-09 NOTE — PROGRESS NOTES
Group Therapy Progress Notes     Client Initial Individualized Goals for Treatment:     Will Improve Mindfulness / Stay in the Here and Now Intentionally bringing your attention to something beautiful, pleasant, or interesting that is occurring or is present in your immediate environment or experience on a regular basis.    Area of Treatment Focus:  Symptom Stabilization and Management    Therapeutic Interventions/Treatment Strategies:  Assist clients in establishing / strengthening support network  Assist with discharge planning  Assess / reassess for appropriate therapy program involvement, encourage participation in therapies  Engage in safety planning when indicated  Facilitate increased self awareness  Provide feedback about social skills  Teach adaptive coping skills and communication skills  Use reality based supportive approach    Response to Treatment Strategies:  Accepted Feedback, Gave Feedback, Listened  and Focused on Goals    Name of Groups:  Coping Skills - Time: 10-10:50; Self Development 1-1:50    Description and Therapeutic Outcome:     During Coping Skills group, Jose presented as bright and engaged. He continues to provide positive feedback to the group process and is appropriately social with peers. Jose has a positive grasp of the skills and is able to elaborate on them with clarity. He continues to work on Present Moment, Time Limit and the 5 to 1 for Mindfulness.    During the follow up group, Jose was able to illustrate skills that are significant to him: Boundaries; T.H.I.N.K.; Focused Mode; Thriving of the Focused; the Thisness, not Thatness concept. Awareness. Jose continues to present as intentional regarding his wellness and remains appropriate for PHP.    Is this a Weekly Review of the Progress on the Treatment Plan?    NO    Are Treatment Plan Goals being addressed?  YES      Are Treatment Plan Strategies to Address Goals Effective?  YES      Are there any current  contracts in place?  YES

## 2023-02-09 NOTE — PROGRESS NOTES
Group Therapy Progress Notes     Client Initial Individualized Goals for Treatment:     Will Improve Mindfulness / Stay in the Here and Now Intentionally bringing your attention to something beautiful, pleasant, or interesting that is occurring or is present in your immediate environment or experience on a regular basis.    Area of Treatment Focus:  Symptom Stabilization and Management     Therapeutic Interventions/Treatment Strategies:  Assist clients in establishing / strengthening support network  Assist with discharge planning  Assess / reassess for appropriate therapy program involvement, encourage participation in therapies  Engage in safety planning when indicated  Facilitate increased self awareness  Provide feedback about social skills  Teach adaptive coping skills and communication skills  Use reality based supportive approach     Response to Treatment Strategies:  Accepted Feedback, Gave Feedback, Listened  and Focused on Goals     Name of Groups:  Wrap Up- 2-3     Description and Therapeutic Outcome:     During Wrap up group Jose presented as alert and intentional. He shares his take away's from the day. He discusses how the skill THINK has been very effective for him and that good enough is more reasonable than perfect. He has practiced and discussed boundaries and focusing on the moment now more than future thinking. Jose continues to practice his skills: Triple A, THINK, Mindfulness, and Grateful for. Evening plans discussed and no concerns noted. Jose remains intentional and appropriate for PP.       Is this a Weekly Review of the Progress on the Treatment Plan?  NO     Are Treatment Plan Goals being addressed?  YES        Are Treatment Plan Strategies to Address Goals Effective?  YES        Are there any current contracts in place?  YES

## 2023-02-09 NOTE — PROGRESS NOTES
Group Therapy Progress Notes     Client Initial Individualized Goals for Treatment:     Will Improve Mindfulness / Stay in the Here and Now Intentionally bringing your attention to something beautiful, pleasant, or interesting that is occurring or is present in your immediate environment or experience on a regular basis.    Area of Treatment Focus:  Symptom Stabilization and Management    Therapeutic Interventions/Treatment Strategies:  Assist clients in establishing / strengthening support network  Assist with discharge planning  Assess / reassess for appropriate therapy program involvement, encourage participation in therapies  Engage in safety planning when indicated  Facilitate increased self awareness  Provide feedback about social skills  Teach adaptive coping skills and communication skills  Use reality based supportive approach    Response to Treatment Strategies:  Accepted Feedback, Gave Feedback, Listened  and Focused on Goals    Name of Groups:  Psychotherapy wrap up group - 2-3    Description and Therapeutic Outcome:   In psychotherapy wrap up group, Jose reviewed his take away from today.  wrap up group, Reese reviewed his take away from today.  Reviewed micro aggressions and then relationships and love languages.  Also reviewed they whys about emotions in OT.   Reviewed what he learned about the love languages related to himself and his mom - was able to practice some skills with his mom yesterday related to the 5:3:2 skill.  Jose continues to engage well - able to review things he has learned as well as how he is practicing the skills outside of the group.  Reviewed evening plans - no safety issues noted - he remains appropriate for PHP.      Is this a Weekly Review of the Progress on the Treatment Plan?  NO    Are Treatment Plan Goals being addressed?  YES      Are Treatment Plan Strategies to Address Goals Effective?  YES      Are there any current contracts in place?  YES

## 2023-02-10 ENCOUNTER — HOSPITAL ENCOUNTER (OUTPATIENT)
Dept: BEHAVIORAL HEALTH | Facility: HOSPITAL | Age: 28
Discharge: HOME | End: 2023-02-10
Attending: PSYCHIATRY & NEUROLOGY
Payer: COMMERCIAL

## 2023-02-10 PROCEDURE — H0035 MH PARTIAL HOSP TX UNDER 24H: HCPCS | Performed by: SOCIAL WORKER

## 2023-02-10 NOTE — PROGRESS NOTES
"Group Therapy Progress Notes     Client Initial Individualized Goals for Treatment:     Will Improve Mindfulness / Stay in the Here and Now Intentionally bringing your attention to something beautiful, pleasant, or interesting that is occurring or is present in your immediate environment or experience on a regular basis.    Area of Treatment Focus:  Symptom Stabilization and Management    Therapeutic Interventions/Treatment Strategies:  Assist clients in establishing / strengthening support network  Assist with discharge planning  Assess / reassess for appropriate therapy program involvement, encourage participation in therapies  Engage in safety planning when indicated  Facilitate increased self awareness  Provide feedback about social skills  Teach adaptive coping skills and communication skills  Use reality based supportive approach    Response to Treatment Strategies:  Accepted Feedback, Gave Feedback, Listened  and Focused on Goals    Name of Groups:  Coping Skills - Time: 10-10:50    Description and Therapeutic Outcome:     During Coping Skills group, Jose presented as bright and smiling - he engages well with staff and peers.  We reviewed the concept of grounding and reviewed \"the big list of grounding techniques\".  Jose participated well in the group, volunteering for some reading as well as giving examples of how he uses some of the grounding techniques.  He was challenged to pick a couple of the skills to practice over the weekend and report to the group in the wrap up group which techniques he was going to practice.  Good group for Jose.        Is this a Weekly Review of the Progress on the Treatment Plan?    NO    Are Treatment Plan Goals being addressed?  YES      Are Treatment Plan Strategies to Address Goals Effective?  YES      Are there any current contracts in place?  YES           "

## 2023-02-12 ENCOUNTER — MYC REFILL (OUTPATIENT)
Dept: FAMILY MEDICINE | Facility: OTHER | Age: 28
End: 2023-02-12

## 2023-02-12 DIAGNOSIS — E55.9 VITAMIN D DEFICIENCY: ICD-10-CM

## 2023-02-12 DIAGNOSIS — T33.821A FROSTBITE OF BOTH FEET, INITIAL ENCOUNTER: ICD-10-CM

## 2023-02-12 DIAGNOSIS — T33.822A FROSTBITE OF BOTH FEET, INITIAL ENCOUNTER: ICD-10-CM

## 2023-02-13 ENCOUNTER — HOSPITAL ENCOUNTER (OUTPATIENT)
Dept: BEHAVIORAL HEALTH | Facility: HOSPITAL | Age: 28
Discharge: HOME | End: 2023-02-13
Attending: PSYCHIATRY & NEUROLOGY
Payer: COMMERCIAL

## 2023-02-13 PROCEDURE — H0035 MH PARTIAL HOSP TX UNDER 24H: HCPCS | Performed by: SOCIAL WORKER

## 2023-02-13 PROCEDURE — H0035 MH PARTIAL HOSP TX UNDER 24H: HCPCS

## 2023-02-13 NOTE — PROGRESS NOTES
"Group Therapy Progress Notes     Client Initial Individualized Goals for Treatment:     Will increase effective use of support / increase ability to ask for help. Identify attitudes or beliefs about asking for help that interfere with your ability to ask for help and identify more helpful attitudes, Identify providers you will continue to see for individual therapy, psychiatric care, group therapy, or other specialized providers. and Create a return to work, return to school, or return to daily routines plan.    Area of Treatment Focus:  Community Resources / Support and Discharge Planning    Therapeutic Interventions/Treatment Strategies:    Assist clients in establishing / strengthening support network  Assist with discharge planning  Engage in safety planning when indicated  Facilitate increased self awareness  Provide feedback about social skills  Teach adaptive coping skills and communication skills  Use reality based supportive approach    Response to Treatment Strategies:  Accepted Feedback, Gave Feedback, Listened  and Focused on Goals    Name of Groups:  Coping Skills - Time: 10-10:50; Listening Skills 1-1:50    Description and Therapeutic Outcome:     During Coping Skills group, with a focus on the \"No-No's\", Jose presented as focused, social, and participatory, with insightful feedback. He grasped the concept of No 'Why'. He understands the context in which it is being applied: 'What' happened, rather than 'Why' as why's can trigger densiveness and shaming.    During the Listening Skills group, Jose volunteered to participate in a role play. The exercise entailed focusing on reflecting and not giving advice. He was proficient in practicing this and is also proficient in skill use: Gratitude; Present Moment; AAA; Know Your Truth; 5 to 1; 3 Good Things. Jose continues to present as intentional regarding his wellness and remains appropriate for PHP.    Is this a Weekly Review of the Progress on the " Treatment Plan?  YES    Are Treatment Plan Goals being addressed?  YES      Are Treatment Plan Strategies to Address Goals Effective?  YES      Are there any current contracts in place?  YES

## 2023-02-13 NOTE — TREATMENT PLAN
Individualized Treatment Plan     Date of Plan: 2023    Name: Jose Alexander MRN: [id number]    : 1995    Programs:  [facility] ()     DSM-V Diagnoses: 296.33 (F33.2) Major Depressive Disorder, Recurrent Episode, Severe _ and With anxious distress;  Generalized Anxiety Disorder    DA Date: 23  Psychosocial & Contextual Factors: limited social support and mental health symptoms  WHODAS:   LOCUS: 22      Team Members Contributing to Plan:  Dr. Mino Thomas, [facility]  Cristina Francis, [facility]  Kat Engle, HATTIE    Client Strengths:  caring, creative, educated, empathetic, employed, goal-focused, good listener, has a previous history of therapy, insightful, intelligent, motivated, open to learning, open to suggestions / feedback, support of family, friends and providers, supportive, wants to learn, willing to ask questions, willing to relate to others and work history    Client Participation in Plan:  Agrees with plan   Received copy of treatment plan     Areas of Vulnerability:  Suicidal Ideation   Poor impulse control   Anxiety  Depressive symptoms   Physical/medical: see medical chart     Long-Term Goals:  Knowledge about illness and management of symptoms   Maintenance of personal safety     Abuse Prevention Plan:  Safe, therapeutic environment   Safety coping plan as needed   Education regarding illness and skill development     Discharge Criteria:  Satisfactory progress toward treatment goals   Improvement re: identified problems and symptoms   Ability to continue recovery at next level of service   Has a discharge plan in place   Regular attendance as scheduled           Areas of Treatment Focus            Area of Treatment Focus:   Personal Safety  Start Date:    23    Goal:  Target Date: 2/3/23 Status: Active  Client will notify staff when needing assistance to develop or implement a coping plan to manage suicidal or self injurious urges.  Client will  "learn and practice 1 - 2 coping skills to help improve depressive symptoms      Progress:    Jose had a great first week of PHP programming, he has been engaged and motivated to learn the content of what is being taught in the groups.  He has been a strong participant and always offers to read and is quick to engaged in group discussion.  Jose has been a strong addition to this group and offers much support and compassion to his peers.     During Shame group, Jose owns Shame as an area that is a challenge for him and valued the topic. Jose was able to differentiate between Guilt: A bad behavior and Shame: I am bad. He also identified his coping as : Withdrawal; turning the tables; lashing out; self put down;  avoidance through thrill seeking. \"I have to work on forgiving myself\". He also recognizes that \"I was the Lost Child\" in the Family Dynamics concept. In the Coping with Shame group, Jose identified skills that he will implement to cope with Shame: the THE skill to not be the behavior as it is THE behavior and that he is not the behavior; Rewire to have healthy thinking; Watch my Words; Drop the Rope regarding the past; Self Compassion; Be active. He will continue to practice Awareness and implement the AAA - Awareness Acceptance Action as well as Present Moment.     In Self Disclosure group, Jose easily participated in the exercise relating to positives, strengths, and areas he wants to develop. Some of the strengths he identified were: spiritual; happy; loving; good boss; boundaries; hopeful; hardworking; caring, positive thinking; curious, reader. He accepted positives from peers and also shifted his 'surviving' to \"thriving\" and plans to work on 'confidence\". Jose was supportive of peers in adding to and supporting their positives.  During Gratitude group, Jose grasped the concept of saying \"Thank you\" rather than \"Sorry\". He identified addressing the sadness relating to death, " "rather than \"thanks for trusting me with this difficult emotion\".     During Shame group, Jose brought out other points as a follow up from the Shame topic yesterday. He focused specifically on 'measuring up' to others. This especially related to working on measuring up to his  brother \"but my body paid the price\" due to excessive sports. He also accepts Shame being the thief of intimacy. Jose has a positive grasp on the Shame concept and will focus on : Drop the Rope; Letting Go; 3 C's; Present Moment; the THE skill; Know My Truth.  In Perfectionism group, Jose identified having Low Self Esteem. He is also aware that \"Pain comes from the difference between what is happening and what I think ought to be happening\". Jose will implement Self Compassion and identified that he will focus on the tools he now has with the skills; use his supports : Step mom and best friend; acceptance; self compassion.     In 3 Cycles of 21 Days group, Jose presented as alert, participatory and provided positive feedback to the group process, as well as to a graduating peer. He continues to work on Awareness and changing his \"I'm sorry to Thank you\". He grasps the concept of the 3 Cycles - beginning with an event and the outcome being a trauma due to not interrupting the process.  During the Self Awareness group, Jose continued to present as positive and engaged. He valued the concept of \"The trauma was real then, but now, it is only an illusion, carried through time via a train of thought\". Jose is also intent on rewiring negatives with positives: worthless - I am loved; ugly - I have great features. He will also practice: the THE skill; Lower Expectations; Mindfulness; Awareness. Jose presents as intentional regarding his wellness and remains appropriate for PHP.    In the next week we will focus on symptom management and introduce mindfulness skills and grounding techniques.  Good first week for Jose!        " "   Treatment Strategies:   Assist clients in establishing / strengthening support network  Assist to identify treatment goals  Assist with discharge planning  Assess / reassess for appropriate therapy program involvement, encourage participation in therapies  Facilitate increased self awareness  Teach adaptive coping skills and communication skills  Use reality based supportive approach    These services will include group therapy and OT group therapy daily and individual therapy and medications management as needed.                 Area of Treatment Focus:   Symptom Stabilization and Management  Start Date:    2/6/23    Goal:  Target Date: 2/10/23 Status: Active  Will Improve Mindfulness / Stay in the Here and Now Intentionally bringing your attention to something beautiful, pleasant, or interesting that is occurring or is present in your immediate environment or experience on a regular basis.      Progress:    Jose has had a very productive week in PHP.  He is very engaged and is a strong participant in the program. Jose has been transparent and honest regarding hs mental danelle symptoms and how they affect him and is extremely diligent about practicing the coping skills outside of the PHP setting.  He also is a wonderful support to his peers and presents much insight into his symptoms and how the skills help him; he will use this as an example as to how a skill may support another peer.    In Radical Acceptance group, Jose identified having \"resentment\" toward his dad because he was the result of his dad's affair. \"I do accept the affair as I would otherwise not be here.\" He acknowledges Letting Go will take time. He will also implement the 3 C's; AAA- Awareness, Acceptance, action; Opposite Action/Opposite Emotion.  During the follow up group, Jose provided positive feedback to an emotional peer. He also engaged in the group process, though found the topic challenging. He will work on \"I AM.. and be " "mindful of what words follow his I Am \"as that is what I am going to be\". He will also focus on Know My Truth; Gratitude; accept life in general.      During Relationships group, Jose regularly provides positive feedback to the group process, as well being supportive of peers. He identified skills that will support relationship: Drop the Rope - this he is working on in regard to his bio dad. He will also implement : Drop the Rope; TFC - Think Feel Choose; Validate; 3 C's; Common Humanity, Just Like Me and Know My Truth.  During the follow up group, which related to the Hello Emotion skill, Jose grasped the concept. He will incorporate : Feelings just are; AAA- Awareness; Acceptance, Action; as well a the THE skill and Present Moment. A skill that was a big take-away from him today was \"Check your soil\". He recognizes that unhealthy soil will affect the outcome of the \"crop\".    In the Love Languages group, Jose identified his Love Language as Quality Time, and secondary as Physical Touch. He valued the 1:1 time with his mom yesterday when he intentionally practiced the 3-minute skill. Jose continues to present as intentional regarding his wellness and remains appropriate for PHP.     In Coping Skills group, Jose has a positive grasp of the skills and is able to elaborate on them with clarity. He continues to work on Present Moment, Time Limit and the 5 to 1 for Mindfulness. During the follow up group, Jose was able to illustrate skills that are significant to him: Boundaries; T.H.I.N.K.; Focused Mode; Thriving of the Focused; the Thisness, not Thatness concept. Awareness.      During Coping Skills group, with a focus on the \"No-No's\", Jose grasped the concept of No 'Why'. He understands the context in which it is being applied: 'What' happened, rather than 'Why' as why's can trigger densiveness and shaming.   During the Listening Skills group, Jose volunteered to participate in a role play. " "The exercise entailed focusing on reflecting and not giving advice. He was proficient in practicing this and is also proficient in skill use: Gratitude; Present Moment; AAA; Know Your Truth; 5 to 1; 3 Good Things. Jose continues to present as intentional regarding his wellness and remains appropriate for PHP.    Good week for Jose! He has quickly adopted many of the skills into his \"toolbox\" and continues to progress in the program. In the next week we will continue to focus on interrupting negative thoughts as well as decreasing depressive and anxiety based symptoms. Jose has been a wonderful addition to the PHP program!            Treatment Strategies:   Assist clients in establishing / strengthening support network  Assist with discharge planning  Assess / reassess level of potential for harm to self or others  Engage in safety planning when indicated  Facilitate increased self awareness  Provide feedback about social skills  Teach adaptive coping skills and communication skills  Use reality based supportive approach    These services will include group therapy and OT group therapy daily and individual therapy and medications management as needed.               Area of Treatment Focus:   Community Resources / Support and Discharge Planning  Start Date:    2/12/23    Goal:  Target Date: 2/24/23 Status: Active  Will increase effective use of support / increase ability to ask for help. Identify attitudes or beliefs about asking for help that interfere with your ability to ask for help and identify more helpful attitudes, Identify providers you will continue to see for individual therapy, psychiatric care, group therapy, or other specialized providers. and Create a return to work, return to school, or return to daily routines plan.      Progress:             Treatment Strategies:   Assist clients in establishing / strengthening support network  Assist with discharge planning  Engage in safety planning when " indicated  Facilitate increased self awareness  Provide feedback about social skills  Teach adaptive coping skills and communication skills  Use reality based supportive approach    These services will include group therapy and OT group therapy daily and individual therapy and medications management as needed.

## 2023-02-13 NOTE — PROGRESS NOTES
Group Therapy Progress Notes     Client Initial Individualized Goals for Treatment:     Will increase effective use of support / increase ability to ask for help. Identify attitudes or beliefs about asking for help that interfere with your ability to ask for help and identify more helpful attitudes, Identify providers you will continue to see for individual therapy, psychiatric care, group therapy, or other specialized providers. and Create a return to work, return to school, or return to daily routines plan.    Area of Treatment Focus:  Community Resources / Support and Discharge Planning    Therapeutic Interventions/Treatment Strategies:  Assist clients in establishing / strengthening support network  Assist with discharge planning  Engage in safety planning when indicated  Facilitate increased self awareness  Provide feedback about social skills  Teach adaptive coping skills and communication skills  Use reality based supportive approach    Response to Treatment Strategies:  Accepted Feedback, Gave Feedback, Listened , Focused on Goals and Attentive    Name of Groups:  Assertiveness - Time: 11:10-12:00    Description and Therapeutic Outcome:   Assertiveness   OT Group - Assertiveness, Boundaries, and Positive Affirmations  This group focuses on passive, aggressive, and assertive communication styles and educates clients on how each of the styles can affect our relationships and how we respond to situations. Reviewed each type of communication and benefits and consequences of each type.  Also encouraged assertive body language and the use of SAS (State the problem, Ask for what you need, and Spell out the benefits of cooperation).  Clients are given the opportunity to share scenarios where they utilized each type of communication and what the end result of each type was.    Had Jose practice assertive communication from a scenario.       Is this a Weekly Review of the Progress on the Treatment Plan?  YES    Are  Treatment Plan Goals being addressed?  YES      Are Treatment Plan Strategies to Address Goals Effective?  YES      Are there any current contracts in place?  YES

## 2023-02-14 ENCOUNTER — HOSPITAL ENCOUNTER (OUTPATIENT)
Dept: BEHAVIORAL HEALTH | Facility: HOSPITAL | Age: 28
Discharge: HOME | End: 2023-02-14
Attending: PSYCHIATRY & NEUROLOGY
Payer: COMMERCIAL

## 2023-02-14 PROCEDURE — H0035 MH PARTIAL HOSP TX UNDER 24H: HCPCS | Performed by: SOCIAL WORKER

## 2023-02-14 NOTE — PROGRESS NOTES
"Group Therapy Progress Notes     Client Initial Individualized Goals for Treatment:     Will increase effective use of support / increase ability to ask for help. Identify attitudes or beliefs about asking for help that interfere with your ability to ask for help and identify more helpful attitudes, Identify providers you will continue to see for individual therapy, psychiatric care, group therapy, or other specialized providers. and Create a return to work, return to school, or return to daily routines plan.    Area of Treatment Focus:  Community Resources / Support and Discharge Planning    Therapeutic Interventions/Treatment Strategies:  Assist clients in establishing / strengthening support network  Assist with discharge planning  Engage in safety planning when indicated  Facilitate increased self awareness  Provide feedback about social skills  Teach adaptive coping skills and communication skills  Use reality based supportive approach    Response to Treatment Strategies:  Accepted Feedback, Gave Feedback, Listened  and Focused on Goals    Name of Groups:  Boundaries - Time: 10-10:50; 1-1:50    Description and Therapeutic Outcome:     During Boundaries group, Jose presented as bright, engaged, and provided positive feedback to the group process. He related to the topic and identified mainly having Rigid Boundaries, moderate porous, and at least two Healthy Boundaries. Jose recognizes Self Esteem issues. During the discussion of Relationship Circles, Jose identified certain friends that 'are out to sea'. He will bring his therapist in to a closer [place] as well as an acquaintance.     During the follow up group, Jose identified the value of having Boundaries: The line/boundary ensures my uniqueness, autonomy and privacy. I am able to be the way I really am, rather than the way people want me to be\". He will continue to practice and implement: Present Moment; 3 C's; AAA; Present Moment; T.H.I.N.K. and " "the 5 to 1. He is also personalizing the skills e.g for the Now I Am, he is using \"In this Moment\". Jose continues to present as intentional regarding his wellness and remains appropriate for PHP.    Is this a Weekly Review of the Progress on the Treatment Plan?  NO    Are Treatment Plan Goals being addressed?  YES      Are Treatment Plan Strategies to Address Goals Effective?  YES      Are there any current contracts in place?  YES           "

## 2023-02-14 NOTE — PROGRESS NOTES
Group Therapy Progress Notes     Client Initial Individualized Goals for Treatment: Will increase effective use of support / increase ability to ask for help. Identify attitudes or beliefs about asking for help that interfere with your ability to ask for help and identify more helpful attitudes, Identify providers you will continue to see for individual therapy, psychiatric care, group therapy, or other specialized providers. and Create a return to work, return to school, or return to daily routines plan.    Area of Treatment Focus:  Community Resources / Support and Discharge Planning    Therapeutic Interventions/Treatment Strategies:  Assist clients in establishing / strengthening support network  Assist to identify treatment goals  Engage in safety planning when indicated  Facilitate increased self awareness  Provide feedback about social skills  Teach adaptive coping skills and communication skills  Use reality based supportive approach    Response to Treatment Strategies:  Accepted Feedback, Gave Feedback, Listened , Focused on Goals, Attentive and Accepted Support    Name of Groups:  Employment Planning - Time: 11:10-12:00    Description and Therapeutic Outcome:   OT life skills group - Finding Purpose  This group focus involves clients identifying their purpose in life.  Education and handouts provided on this.  Discussed how our purposeful activities can change throughout our life span.  Discussion on setting healthy boundaries and finding time for self care as our purposeful activities can cause stress and anxiety in life.  Encouraged a healthy balanced lifestyle and seeking out purposeful activities.  In OT group today, Jose presents as focused and engaged.  States that foe some time he did not have a purpose but admits that within the last month or so he has found his purpose and setting daily goals that work towards engaging in that purpose.         Is this a Weekly Review of the Progress on the  Treatment Plan?  NO    Are Treatment Plan Goals being addressed?  YES      Are Treatment Plan Strategies to Address Goals Effective?  YES      Are there any current contracts in place?  YES

## 2023-02-15 ENCOUNTER — DOCUMENTATION ONLY (OUTPATIENT)
Dept: SLEEP MEDICINE | Facility: HOSPITAL | Age: 28
End: 2023-02-15

## 2023-02-15 ENCOUNTER — HOSPITAL ENCOUNTER (OUTPATIENT)
Dept: BEHAVIORAL HEALTH | Facility: HOSPITAL | Age: 28
Discharge: HOME | End: 2023-02-15
Attending: PSYCHIATRY & NEUROLOGY
Payer: COMMERCIAL

## 2023-02-15 PROCEDURE — H0035 MH PARTIAL HOSP TX UNDER 24H: HCPCS | Performed by: SOCIAL WORKER

## 2023-02-15 NOTE — PROGRESS NOTES
Group Therapy Progress Notes     Client Initial Individualized Goals for Treatment:      Will increase effective use of support / increase ability to ask for help. Identify attitudes or beliefs about asking for help that interfere with your ability to ask for help and identify more helpful attitudes, Identify providers you will continue to see for individual therapy, psychiatric care, group therapy, or other specialized providers. and Create a return to work, return to school, or return to daily routines plan.     Area of Treatment Focus:  Community Resources / Support and Discharge Planning     Therapeutic Interventions/Treatment Strategies:  Assist clients in establishing / strengthening support network  Assist with discharge planning  Engage in safety planning when indicated  Facilitate increased self awareness  Provide feedback about social skills  Teach adaptive coping skills and communication skills  Use reality based supportive approach     Response to Treatment Strategies:  Accepted Feedback, Gave Feedback, Listened  and Focused on Goals     Name of Groups:  Psychotherapy Group: 9-10 AM      Description and Therapeutic Outcome:      During Psychotherapy group Jose presented alert and oriented. He shares his night of shopping and reading. He discusses using his skills 5 to 1 to and mindfulness to stay in the present moment. He reports going over the handouts further and working through an application for peer support.      Is this a Weekly Review of the Progress on the Treatment Plan?  NO     Are Treatment Plan Goals being addressed?  YES        Are Treatment Plan Strategies to Address Goals Effective?  YES        Are there any current contracts in place?  YES

## 2023-02-15 NOTE — PROGRESS NOTES
"Group Therapy Progress Notes     Client Initial Individualized Goals for Treatment:     Will increase effective use of support / increase ability to ask for help. Identify attitudes or beliefs about asking for help that interfere with your ability to ask for help and identify more helpful attitudes, Identify providers you will continue to see for individual therapy, psychiatric care, group therapy, or other specialized providers. and Create a return to work, return to school, or return to daily routines plan.    Area of Treatment Focus:  Community Resources / Support and Discharge Planning    Therapeutic Interventions/Treatment Strategies:  Assist clients in establishing / strengthening support network  Assist with discharge planning  Engage in safety planning when indicated  Facilitate increased self awareness  Provide feedback about social skills  Teach adaptive coping skills and communication skills  Use reality based supportive approach    Response to Treatment Strategies:  Accepted Feedback, Gave Feedback, Listened  and Focused on Goals    Name of Groups:  Self Dickson - Time: 10-10:50; States of Mind 1-1:50    Description and Therapeutic Outcome:     During Self Dickson group, Jose presented as alert, eager to participate, and insightful in response. He grasps the concept of Self Dickson relating to the Internal Locus and Self Esteem relating to being influenced by Externals. He presents as confident, identifies having positive Self Dickson and \"You can't say something worse to me than what  I've already said to myself\".     During the States of Mind group, Jose identified being more in his Reasonable Mind. He is aware of the influence that the Emotional Mind can have on decision-making. Jose identifies Spirituality as significant in his life. He does acknowledge comparing himself to others reflects \"unreal expectations\" and will work on Lowering Expectations. Jose will continue to implement: In this " Moment; 3 C's; Gratitude; Present Moment; 5 to 1; Just Like Me and Guided Meditation. Jose presents as intentional regarding his wellness and remains appropriate for PHP.    Is this a Weekly Review of the Progress on the Treatment Plan?  NO    Are Treatment Plan Goals being addressed?  YES      Are Treatment Plan Strategies to Address Goals Effective?  YES      Are there any current contracts in place?  YES

## 2023-02-15 NOTE — PROGRESS NOTES
"Group Therapy Progress Notes     Client Initial Individualized Goals for Treatment:      Will increase effective use of support / increase ability to ask for help. Identify attitudes or beliefs about asking for help that interfere with your ability to ask for help and identify more helpful attitudes, Identify providers you will continue to see for individual therapy, psychiatric care, group therapy, or other specialized providers. and Create a return to work, return to school, or return to daily routines plan.     Area of Treatment Focus:  Community Resources / Support and Discharge Planning     Therapeutic Interventions/Treatment Strategies:  Assist clients in establishing / strengthening support network  Assist with discharge planning  Engage in safety planning when indicated  Facilitate increased self awareness  Provide feedback about social skills  Teach adaptive coping skills and communication skills  Use reality based supportive approach     Response to Treatment Strategies:  Accepted Feedback, Gave Feedback, Listened  and Focused on Goals     Name of Groups:  Wrap Up Group: 2-3 PM      Description and Therapeutic Outcome:      During Wrap Up group Jose presented Intentional and focused. He discusses his take away's from today. Jose shares that while discussing raya mind that he believes this is helpful for him and that reasonable mind is \"less fun\" if emotions are not involved. Discussed with Jose of finding a good balance between the two so it is not all of nothing thinking. He continues to share that he feels he is more emotional thinking when it comes to people in his life due to his \"protectivness\" and \"empathy\". Jose continues to practice his romel: 3 C's, Mindfulness, and 5 to 1. Evening plans discussed and no safety concerns noted. Jose continues to engage well in groups and remains appropriate for PHP.      Is this a Weekly Review of the Progress on the Treatment Plan?  NO     Are " Treatment Plan Goals being addressed?  YES        Are Treatment Plan Strategies to Address Goals Effective?  YES        Are there any current contracts in place?  YES

## 2023-02-15 NOTE — PROGRESS NOTES
SLEEP HISTORY QUESTIONNAIRE    Please describe the main reason for your sleep appointment? Snoring, trouble sleeping, over sleeping    How long has this been a problem? 4-8 years    Have you been diagnosed with a sleep problem in the past? NO    If so, what? n/a    What treatment was recommended? n/a    Have you had a sleep study in the past? NO    If yes, where and when? n/a    Sleep Habits:   Do you read in bed? Yes  Do you eat in bed? No  Do you watch TV in bed? Yes  Do you work in bed? No  Do you use a phone or computer in bed? Yes    Is you sleep disturbed by:   Bed partner: No  Children: No  Noise: No   Pets: No  Other: no      On two or more nights per week, do you drink alcohol to help you fall asleep?NO    On two or more nights per week, do you take melatonin to help you fall asleep? NO    On two or more nights per week, do you take over the counter medicine to fall asleep?  YES    Do you take drinks with caffeine (coffee, tea, soda, energy drinks)? YES    Do you have 3 or more caffeine drinks in a day? NO    Do you have caffeine drinks within 6 hours of bedtime? NO    Do you smoke or use tobacco? YES    Do you exercise? YES    Sleep Routine:   Using a 24 Hour Clock    What time do you usually get into bed on workdays? 5am    Weekend/non work days? 5-6am    What time do you get out of bed on workdays? 4-5pm      Weekend/non work days?4-6pm    Do you work the evening or night shift or do your shifts rotate? YES    How long does it usually take to fall to sleep? 2-3 hours    How many times do you wake during the night? 1-2    How much time do you feel that you are awake during the entire night? 1 hour    How long does it take for you to fall back to sleep after you wake up? 5 min    Why do you think you wake up? Thirsty/bathroom    What do you do when you wake up? Drink water, use bathroom    How much sleep do you think you get on work nights? 8-10    How much sleep do you think you get on weekends/non work  days? 8-10    How much sleep do you think you need to feel your best? 6    How many days during a week do you take a nap on average? 0    What is the average length of your naps? n/a    Do you feel better after taking a nap? DOES NOT APPLY    If you could chose the best sleep schedule for you, what time would you go to bed? 6am  What time would you get up? 2-7pm    Do you read in bed? YES    Do you eat in bed? NO    Do you watch TV in bed? YES    Do you do work in bed? NO    Do you use a computer or phone in bed? YES    Sleep Disruptions?   Leg movements:  Do you ever have restless, crawling, aching or other unusual feelings in your legs? NO    Do you ever wake yourself by kicking your legs during the night? YES    Are the sheets and blankets messed up or tossed about when you get up? YES    Night-time behaviors:   Do you have nightmares or night terrors? NO   How often? n/a    Have you had times when you were sleep walking? NO    Have you been seen doing anything unusual while you sleep at nights? NO  What? n/a  How often? n/a    Have you ever hurt yourself or someone else while you were sleeping? NO  Please describe: n/a    Do you clench or grind your teeth during the night? no    Sleep Apnea (pauses in breathing during sleep):  Do you wake with a headache in the morning? YES  How often? 1/week    Does your bed partner, family or friends ever say that you snore? NO  How many nights per week do you snore? n/a  Can snoring be heard outside the bedroom? n/a    Do you ever wake yourself up from snoring, gasping or choking? YES    Have you ever been told that you stop breathing or have pauses in your breathing? NO    Do you wake in the morning with a dry throat or mouth? YES    Do you have trouble breathing through your nose? YES    Do you have problems with heartburn, reflux or a hiatal hernia? YES    Which positions do you usually sleep in? (stomach, back, sides, all) back, sides    Do you use oxygen or any other  medical equipment when you sleep? NO    Do members of your family (related by blood) snore? YES    Have any members of your family been diagnosed with with sleep apnea? YES    Do other members of your family have restless leg? NO    Do other members of your family have sleep walking? NO    Have you ever had an accident, or near accident due to sleepiness while driving? NO    Does your sleepiness affect your work on the job or at school? NO    Do you ever fall asleep by accident while doing a task? NO    Have you had sudden muscle weakness when laughing, angry or surprised? NO    Have you ever been unable to move your body when falling asleep or waking up? NO    Do you ever have trouble  your dreams from real life events? NO  Please describe: n/a    Physical Health: (including illness and injury): During the past 30 days, on how many days was your physical health not good?  days     Mental Health: (including stress, depression, and problems with emotions): During the last 30 days, how may days was your mental health not good? 10/30 days.     During the past 30 days, on how many days did poor physical or mental health keep you from doing your usual activities? This might be self-care, work, or play? 10/30 days.     Social History:   Marital status: single    Who lives in your home with you? Mom, step dad, brother    Mother (alive or dead)? alive If has , from what? n/a  Father (alive or dead)? alive If has , from what? n/a    Siblings: YES  Have any ? NO  If so, from what? n/a    Currently working? YES  If yes, work:   Former jobs: , , labor     Sleepiness Scale:   Sitting and reading 2   Watching TV 2   Sitting in a public place 0   Riding in a car 0   Lying down to rest in the afternoon 2   Sitting and talking to someone 0   Sitting quietly after a lunch without alcohol 0   In a car, stopping for a few minutes in traffic 0       Surgical History: No past surgical  history on file.    Medical Conditions: No past medical history on file.    Medications:   Current Outpatient Medications   Medication Sig     diclofenac (VOLTAREN) 1 % topical gel Apply 4 g topically 4 times daily -apply gel to both feet, avoiding open area and rub into skin gently; apply to entire joint.     DULoxetine (CYMBALTA) 30 MG capsule Take 1 capsule (30 mg) by mouth 2 times daily     gabapentin (NEURONTIN) 800 MG tablet Take 1 tablet (800 mg) by mouth 3 times daily     ibuprofen (ADVIL/MOTRIN) 400 MG tablet Take 400 mg by mouth every 6 hours as needed -administer with food.     venlafaxine (EFFEXOR XR) 150 MG 24 hr capsule Take 150 mg by mouth daily     venlafaxine (EFFEXOR XR) 75 MG 24 hr capsule Take 1 capsule (75 mg) by mouth daily for 30 days     vitamin D3 (CHOLECALCIFEROL) 1.25 MG (49678 UT) capsule Take 1 capsule (50,000 Units) by mouth every 7 days for 10 doses     No current facility-administered medications for this visit.       Are you currently having any of the following symptoms?   General:   Obvious weight gain or loss NO  Fever, chills or sweats NO  Drug allergies: no    Eyes:   Changes in vision NO  Blind spots NO  Double vision NO  Other no    Ear, Nose and Throat:   Ear pain NO  Sore throat NO  Sinus pain NO  Post-nasal drip NO  Runny nose NO  Bloody nose NO    Heart:   Rapid or irregular heart beat NO  Chest pain or pressure NO  Out of breath when lying down NO  Swelling in feet or legs NO  High blood pressure NO  Heart disease NO    Nervous system   Headaches NO  Weakness in arms or legs NO  Numbness in arms of legs NO  Other: no    Skin  Rashes NO  New moles or skin changes NO  Other no    Lungs  Shortness of breath at rest NO  Shortness of breath with activity NO  Dry cough NO  Coughing up mucous or phlegm NO  Coughing up blood NO  Wheezing when breathing NO    Lymph System  Swollen lymph nodes NO  New lumps or bumps NO  Changes in breasts or discharge NO    Digestive System    Nausea or vomiting NO  Loose or watery stools NO  Hard, dry stools (constipation) NO  Fat or grease in stools NO  Blood in stools NO  Stools are black or bloody NO  Abdominal (belly) pain NO    Urinary Tract   Pain when you urinate (pee) NO  Blood in your urine NO  Urinate (pee) more than normal NO  Irregular periods NO    Muscles and bones   Muscle pain YES  Joint or bone pain YES  Swollen joints NO  Other no    Glands  Increased thirst or urination NO  Diabetes NO  Morning glucose: no  Afternoon glucose: no    Mental Health  Depression YES  Anxiety YES  Other mental health issues: yes

## 2023-02-15 NOTE — PROGRESS NOTES
STOP BANG       Name: Jose Alexander MRN# [id number]   Age: 27 year old YOB: 1995     Stop Bang questionnaire completed with a score of >3 to allow for HST     Have you been told you snore loudly (louder than talking or loud enough to be heard through doors)? NO    Do you often feel tired, fatigued, or sleepy during the daytime? YES    Has anyone observed you stop breathing during your sleep? NO    Do you have or are you being treated for high blood pressure? NO    Is your BMI greater than 35? YES    Is your neck size circumference 16 inches or greater? YES    Are you over 50 years old? NO    Stop Bang Score (# of yes): 4

## 2023-02-15 NOTE — PROGRESS NOTES
"Group Therapy Progress Notes     Client Initial Individualized Goals for Treatment:      Will increase effective use of support / increase ability to ask for help. Identify attitudes or beliefs about asking for help that interfere with your ability to ask for help and identify more helpful attitudes, Identify providers you will continue to see for individual therapy, psychiatric care, group therapy, or other specialized providers. and Create a return to work, return to school, or return to daily routines plan.     Area of Treatment Focus:  Community Resources / Support and Discharge Planning     Therapeutic Interventions/Treatment Strategies:  Assist clients in establishing / strengthening support network  Assist with discharge planning  Engage in safety planning when indicated  Facilitate increased self awareness  Provide feedback about social skills  Teach adaptive coping skills and communication skills  Use reality based supportive approach     Response to Treatment Strategies:  Accepted Feedback, Gave Feedback, Listened  and Focused on Goals     Name of Groups:  Wrap Up Group: 2-3 PM      Description and Therapeutic Outcome:   In psychotherapy wrap up group, Jose reviewed his take away from today - reviewed Relationship circles, boundaries and finding purpose.  Fadi engaged very well and spontaneously reviewed his day as well as giving nice feedback and support to his peers.  Discussed his insight into having rigid boundaries and awareness - will use 3C's, present moment and AAA as well as THINK and Just do it related to his boundaries.  Reports focus is on keeping the past in the past and has adapted one of the Now I am skills to \"In this moment\" to practice present moment.  Related to purpose, he is looking into the option of being a  - feels his purpose is related to MH.  Discussed taking steps and completing things one step at a time so he has goals and not having this just be a " concept that he would like to do.  Good group for Jose.  Reviewed evening plans - no safety issues noted. He remains appropriate for PHP.      Is this a Weekly Review of the Progress on the Treatment Plan?  NO     Are Treatment Plan Goals being addressed?  YES        Are Treatment Plan Strategies to Address Goals Effective?  YES        Are there any current contracts in place?  YES

## 2023-02-16 ENCOUNTER — HOSPITAL ENCOUNTER (OUTPATIENT)
Dept: BEHAVIORAL HEALTH | Facility: HOSPITAL | Age: 28
Discharge: HOME | End: 2023-02-16
Attending: PSYCHIATRY & NEUROLOGY
Payer: COMMERCIAL

## 2023-02-16 PROCEDURE — H0035 MH PARTIAL HOSP TX UNDER 24H: HCPCS

## 2023-02-16 PROCEDURE — H0035 MH PARTIAL HOSP TX UNDER 24H: HCPCS | Performed by: SOCIAL WORKER

## 2023-02-16 NOTE — PROGRESS NOTES
Group Therapy Progress Notes       Client Initial Individualized Goals for Treatment:      Will increase effective use of support / increase ability to ask for help. Identify attitudes or beliefs about asking for help that interfere with your ability to ask for help and identify more helpful attitudes, Identify providers you will continue to see for individual therapy, psychiatric care, group therapy, or other specialized providers. and Create a return to work, return to school, or return to daily routines plan.     Area of Treatment Focus:  Community Resources / Support and Discharge Planning     Therapeutic Interventions/Treatment Strategies:  Assist clients in establishing / strengthening support network  Assist with discharge planning  Engage in safety planning when indicated  Facilitate increased self awareness  Provide feedback about social skills  Teach adaptive coping skills and communication skills  Use reality based supportive approach     Response to Treatment Strategies:  Accepted Feedback, Gave Feedback, Listened  and Focused on Goals     Name of Groups:  Wrap Up Group: 2-3 PM      Description and Therapeutic Outcome:      During Wrap Up group Jose presented engaged and participatory. He appears with a bright affect and willingness to participate in group. Discussed take aways from today. He shares that he struggles giving his compassion to other people rather than himself and would like to change that. He reports wanting to find more hobbies that are psychical or social such as visiting museums or art galleries. He shares he was struggling to reveal what his identity was outside of different things and realized his identity is fluid. Jose continues to practice his romel: 3 C's, Mindfulness, and 5 to 1. Evening plans discussed and no safety concerns noted. Jose continues to engage well in groups and remains appropriate for PHP.      Is this a Weekly Review of the Progress on the Treatment  Plan?  NO     Are Treatment Plan Goals being addressed?  YES        Are Treatment Plan Strategies to Address Goals Effective?  YES        Are there any current contracts in place?  YES

## 2023-02-16 NOTE — PROGRESS NOTES
Group Therapy Progress Notes     Client Initial Individualized Goals for Treatment: Will increase effective use of support / increase ability to ask for help. Identify attitudes or beliefs about asking for help that interfere with your ability to ask for help and identify more helpful attitudes, Identify providers you will continue to see for individual therapy, psychiatric care, group therapy, or other specialized providers. and Create a return to work, return to school, or return to daily routines plan.    Area of Treatment Focus:  Community Resources / Support and Discharge Planning    Therapeutic Interventions/Treatment Strategies:  Assist clients in establishing / strengthening support network  Assist with discharge planning  Engage in safety planning when indicated  Facilitate increased self awareness  Provide feedback about social skills  Teach adaptive coping skills and communication skills  Use reality based supportive approach    Response to Treatment Strategies:  Accepted Feedback, Gave Feedback, Listened , Focused on Goals, Attentive and Accepted Support    Name of Groups:  Healthy Leisure - Time: 11:10-12:00    Description and Therapeutic Outcome:   OT group - Healthy leisure  Group today is focused on healthy leisure exploration.  Watched a short video on the benefits of healthy leisure, particularly outdoor activity and the benefits it provides to our overall health.  Handouts and education provided on the benefits of hobbies and leisure activities.  Discussed the 6 types of leisure (social, creative, physical, cognitive, relaxation and spiritual) and clients completed a checklist to learn about what types of leisure they engage in mostly.  Clients ended group with creating a leisure goal.  Handout issued with healthy leisure options to do on the [place].  In OT group today, Jose is bright and engaged.  States that his biggest barrier to leisure participation is taking on too many hrs at work.     Discussed his plan to add more leisure time into his day,       Is this a Weekly Review of the Progress on the Treatment Plan?  NO    Are Treatment Plan Goals being addressed?  YES      Are Treatment Plan Strategies to Address Goals Effective?  YES      Are there any current contracts in place?  YES

## 2023-02-16 NOTE — PROGRESS NOTES
Chart review prior to sleep testing.    Patient Summary:  27 year old yo male who is referred for concern for sleep-disordered breathing.    Patient Active Problem List    Diagnosis Date Noted     Morbid obesity (H) 01/23/2023     Priority: Medium     Strep pharyngitis 12/31/2022     Priority: Medium     Frostbite of both feet 12/20/2022     Priority: Medium     Suicide attempt (H) 11/08/2021     Priority: Medium     Suicidal overdose, subsequent encounter 11/08/2021     Priority: Medium     Moderate episode of recurrent major depressive disorder (H) 09/14/2021     Priority: Medium     Major depressive disorder, recurrent severe without psychotic features (H) 09/14/2021     Priority: Medium     Anxiety disorder 06/04/2019     Priority: Medium     BPPV (benign paroxysmal positional vertigo) 04/10/2012     Priority: Medium     Formatting of this note might be different from the original.  IMO Update 10/11       Pes planovalgus 08/26/2010     Priority: Medium     Other acne 03/31/2010     Priority: Medium     Seborrheic dermatitis, unspecified 03/31/2010     Priority: Medium     Formatting of this note might be different from the original.  IMO Update 10/11       Bilateral knee pain 01/08/2009     Priority: Medium     Headache 11/11/2005     Priority: Medium     Formatting of this note might be different from the original.  Could be sinus allergies  IMO Update 10 2016         Current Outpatient Medications   Medication     diclofenac (VOLTAREN) 1 % topical gel     DULoxetine (CYMBALTA) 30 MG capsule     gabapentin (NEURONTIN) 800 MG tablet     ibuprofen (ADVIL/MOTRIN) 400 MG tablet     venlafaxine (EFFEXOR XR) 150 MG 24 hr capsule     venlafaxine (EFFEXOR XR) 75 MG 24 hr capsule     vitamin D3 (CHOLECALCIFEROL) 1.25 MG (05219 UT) capsule     No current facility-administered medications for this visit.       Pertinent PMHx of severe obesity, MDD, anxiety, prior SI with overdose attempt.    STOP-BANG score of 4, with  "unknown neck circumference.  [place] score of 6.  BMI of Estimated body mass index is 40.24 kg/m  as calculated from the following:    Height as of 1/3/23: 1.829 m (6').    Weight as of 1/23/23: 134.6 kg (296 lb 11.2 oz).     Per questionnaire: \"Snoring, trouble sleeping, over sleeping\"    Sxs for 4-8 years, no prior sleep testing.    Caffeine use:  No for 3+ per day.  No for within 6 hours of bed.    Tobacco use: Yes    Sleep pattern:  Workdays.  5am to 4-5pm, total sleep time 8-10 hours.  Weekends.  5-6am to 4-6pm, total sleep time 8-10 hours.  Time to fall asleep: ~2-3 hours.  Awakenings: 1-2 times per night, 5 minutes to return to sleep, awake for total of 1 hours per night.  Napping.  0 days per week, - hours per nap.    Sleep Habits:   Do you read in bed? Yes  Do you eat in bed? No  Do you watch TV in bed? Yes  Do you work in bed? No  Do you use a phone or computer in bed? Yes    No for RLS screen.  No for sleep walking.  No for dream enactment behavior.  No for bruxism.    Yes for morning headaches.  No for snoring.  No for observed apnea.  Yes for FHx of PLACIDO.    SHx:  Single, lives with mother, step father, brother.  Working as     A/P:    1.)  High likelihood of PLACIDO with STOP-BANG score of 4.   - Would appear to be candidate for either home sleep testing or in-lab PSG.    2.) Severe / Morbid Obesity:   - Counseled on the importance of strict adherence to diet, an exercise routine, and reducing weight.  Weight loss would improve sleep-disordered breathing.  Estimated body mass index is 40.24 kg/m  as calculated from the following:    Height as of 1/3/23: 1.829 m (6').    Weight as of 1/23/23: 134.6 kg (296 lb 11.2 oz).    3.)  Sleep onset insomnia  - Appears to have quite delayed sleep phase, potential 2nd or 3rd shift work.  - Components of psychophysiological insomnia with read / TV / phone in bed.    ---  This note was written with the assistance of the Dragon voice-dictation technology " software. The final document, although reviewed, may contain errors. For corrections, please contact the office.    Eze De La Torre MD    Sleep Medicine    [facility]  - [place], [place]  o Main Office: [phone]    [facility] [address] [place] [address], [address]  o Schedule visits: [phone]  o Main Office: [phone]  o Fax: [phone]

## 2023-02-16 NOTE — PROGRESS NOTES
Group Therapy Progress Notes     Client Initial Individualized Goals for Treatment:     Will increase effective use of support / increase ability to ask for help. Identify attitudes or beliefs about asking for help that interfere with your ability to ask for help and identify more helpful attitudes, Identify providers you will continue to see for individual therapy, psychiatric care, group therapy, or other specialized providers. and Create a return to work, return to school, or return to daily routines plan.    Area of Treatment Focus:  Community Resources / Support and Discharge Planning    Therapeutic Interventions/Treatment Strategies:  Assist clients in establishing / strengthening support network  Assist with discharge planning  Engage in safety planning when indicated  Facilitate increased self awareness  Provide feedback about social skills  Teach adaptive coping skills and communication skills  Use reality based supportive approach    Response to Treatment Strategies:  Accepted Feedback, Gave Feedback, Listened  and Focused on Goals    Name of Groups:  Identity - Time: 10-10:50; Self Compassion 1-1:50    Description and Therapeutic Outcome:     During Identity group, Jose eagerly participated in the group process. He also illustrated the 5 Why's concept and process. He acknowledges his job being his Identity and the challenge of having no job. He is, however, applying for another job. He is also working  on  Hello Emotion. 5 to 1; In This Moment; and the 3 Minute skill. He identified : Life is a challenge, meet it; Life is a dream, realize it; Life is Love, enjoy it.    During Self Compassion group, Jose continues to present as positive and with a broad affect. He provided positive feedback to the topic and will continue to work and practice Self Compassion. He presents as intentional regarding his wellness and remains appropriate for PHP.    Is this a Weekly Review of the Progress on the Treatment  Plan?  NO    Are Treatment Plan Goals being addressed?  YES      Are Treatment Plan Strategies to Address Goals Effective?  YES      Are there any current contracts in place?  YES

## 2023-02-16 NOTE — PROGRESS NOTES
Group Therapy Progress Notes     Client Initial Individualized Goals for Treatment:      Will increase effective use of support / increase ability to ask for help. Identify attitudes or beliefs about asking for help that interfere with your ability to ask for help and identify more helpful attitudes, Identify providers you will continue to see for individual therapy, psychiatric care, group therapy, or other specialized providers. and Create a return to work, return to school, or return to daily routines plan.     Area of Treatment Focus:  Community Resources / Support and Discharge Planning     Therapeutic Interventions/Treatment Strategies:  Assist clients in establishing / strengthening support network  Assist with discharge planning  Engage in safety planning when indicated  Facilitate increased self awareness  Provide feedback about social skills  Teach adaptive coping skills and communication skills  Use reality based supportive approach     Response to Treatment Strategies:  Accepted Feedback, Gave Feedback, Listened  and Focused on Goals     Name of Groups:  Psychotherapy Group: 9-10 AM      Description and Therapeutic Outcome:     During Psychotherapy group Jose presented as well oriented and alert. He has a bright affect and willingness to share in group. He states he spent some time with his mom yesterday going over triggers and other paperwork like what you expect from your supports. He states this went well but needs to work on lowering his expectations. Jose describes using his skill Hello Emotion this morning while thinking of a negative thought. He shares he was able to recognize it, and move forward from it. He shares great insight into how to how the skills work for him and continue to practice his skills: 5-1, 3 C's, Gratitude's, and Mindfulness.      Is this a Weekly Review of the Progress on the Treatment Plan?  NO     Are Treatment Plan Goals being addressed?  YES        Are Treatment  Plan Strategies to Address Goals Effective?  YES        Are there any current contracts in place?  YES

## 2023-02-17 ENCOUNTER — HOSPITAL ENCOUNTER (OUTPATIENT)
Dept: BEHAVIORAL HEALTH | Facility: HOSPITAL | Age: 28
Discharge: HOME | End: 2023-02-17
Attending: PSYCHIATRY & NEUROLOGY
Payer: COMMERCIAL

## 2023-02-17 PROCEDURE — H0035 MH PARTIAL HOSP TX UNDER 24H: HCPCS | Performed by: SOCIAL WORKER

## 2023-02-17 PROCEDURE — H0035 MH PARTIAL HOSP TX UNDER 24H: HCPCS

## 2023-02-17 NOTE — PROGRESS NOTES
"Group Therapy Progress Notes       Client Initial Individualized Goals for Treatment:      Will increase effective use of support / increase ability to ask for help. Identify attitudes or beliefs about asking for help that interfere with your ability to ask for help and identify more helpful attitudes, Identify providers you will continue to see for individual therapy, psychiatric care, group therapy, or other specialized providers. and Create a return to work, return to school, or return to daily routines plan.     Area of Treatment Focus:  Community Resources / Support and Discharge Planning     Therapeutic Interventions/Treatment Strategies:  Assist clients in establishing / strengthening support network  Assist with discharge planning  Engage in safety planning when indicated  Facilitate increased self awareness  Provide feedback about social skills  Teach adaptive coping skills and communication skills  Use reality based supportive approach     Response to Treatment Strategies:  Accepted Feedback, Gave Feedback, Listened  and Focused on Goals     Name of Groups:  Wrap Up Group: 2-3 PM      Description and Therapeutic Outcome:   In psychotherapy wrap up group, Jose reviewed his take away from today.  Reviewed States of mind and wise mind again today and he helped teach some for group members who were not in the discussion earlier in the week.  Also reviewed Self awareness and Progressive muscle relaxation.  He reports really liking the self awareness group and learning more about each other - reports helping him feel more comfortable with the group members when he can get to know them a little better.  He was able to share more about using wise mind and helped in the discussion during the group to assist him in \"cementing\" the concept in his mind.  Reviewed skills he will intentionally practice this weekend - self care, drop the rope, THINK, common humanity and raya mind.  Jose engaged well in the group " today - giving helpful feedback to peers but also taking support and feedback from staff and peers.  No safety issues noted for his weekend - he remains appropriate for PHP.       Is this a Weekly Review of the Progress on the Treatment Plan?  NO     Are Treatment Plan Goals being addressed?  YES        Are Treatment Plan Strategies to Address Goals Effective?  YES        Are there any current contracts in place?  YES

## 2023-02-17 NOTE — PROGRESS NOTES
Group Therapy Progress Notes     Client Initial Individualized Goals for Treatment:      Will increase effective use of support / increase ability to ask for help. Identify attitudes or beliefs about asking for help that interfere with your ability to ask for help and identify more helpful attitudes, Identify providers you will continue to see for individual therapy, psychiatric care, group therapy, or other specialized providers. and Create a return to work, return to school, or return to daily routines plan.     Area of Treatment Focus:  Community Resources / Support and Discharge Planning     Therapeutic Interventions/Treatment Strategies:  Assist clients in establishing / strengthening support network  Assist with discharge planning  Engage in safety planning when indicated  Facilitate increased self awareness  Provide feedback about social skills  Teach adaptive coping skills and communication skills  Use reality based supportive approach     Response to Treatment Strategies:  Accepted Feedback, Gave Feedback, Listened  and Focused on Goals     Name of Groups:  Psychotherapy Group: 9-10 AM      Description and Therapeutic Outcome:      During Psychotherapy group Jose presented as well engaged with a bright affect. He states that he was able to use his skills Hello Emotion and 5 Why's last night during tough negative thoughts. He shares how this helps him be able to work through the thoughts.  Jose shares he had a tougher time sleeping last night which is new to him. He shares great insight into how to how the skills work for him and continue to practice his skills: 5-1, 3 C's, Gratitude's, Triple A, and Present Moment.      Is this a Weekly Review of the Progress on the Treatment Plan?  NO     Are Treatment Plan Goals being addressed?  YES        Are Treatment Plan Strategies to Address Goals Effective?  YES        Are there any current contracts in place?  YES

## 2023-02-17 NOTE — PROGRESS NOTES
Group Therapy Progress Notes     Client Initial Individualized Goals for Treatment:     Will increase effective use of support / increase ability to ask for help. Identify attitudes or beliefs about asking for help that interfere with your ability to ask for help and identify more helpful attitudes, Identify providers you will continue to see for individual therapy, psychiatric care, group therapy, or other specialized providers. and Create a return to work, return to school, or return to daily routines plan.    Area of Treatment Focus:  Community Resources / Support and Discharge Planning    Therapeutic Interventions/Treatment Strategies:  Assist clients in establishing / strengthening support network  Assist to identify treatment goals  Engage in safety planning when indicated  Facilitate increased self awareness  Provide feedback about social skills  Teach adaptive coping skills and communication skills  Use reality based supportive approach    Response to Treatment Strategies:  Accepted Feedback, Gave Feedback, Listened  and Focused on Goals    Name of Groups:  Stress Management - Time: 11:10-12:00    Description and Therapeutic Outcome:   OT group: Went over the use of biofeedback as a tool to gain awareness to the mind/body connections. Provided participants with biodots and had them complete a self check in to rate their stress level. After a few minutes had them look at the biodot to see if where they were scoring. As an added skill today, discussed progressive muscle relaxation and how this aid with activating the rest and digest system. Had participants complete a round of progressive muscle relaxation and re-check their biodot.  Jose notes that he engaged in guided imagery after the round of progressive muscle relaxation.   Validated patient on layering of skills. Jose notes that he also reached out to someone in his life to be a support. He stated that this person said they needed to think about  it.       Is this a Weekly Review of the Progress on the Treatment Plan?  NO    Are Treatment Plan Goals being addressed?  YES      Are Treatment Plan Strategies to Address Goals Effective?  YES      Are there any current contracts in place?  YES

## 2023-02-17 NOTE — PROGRESS NOTES
"Group Therapy Progress Notes     Client Initial Individualized Goals for Treatment:     Will increase effective use of support / increase ability to ask for help. Identify attitudes or beliefs about asking for help that interfere with your ability to ask for help and identify more helpful attitudes, Identify providers you will continue to see for individual therapy, psychiatric care, group therapy, or other specialized providers. and Create a return to work, return to school, or return to daily routines plan.    Area of Treatment Focus:  Community Resources / Support and Discharge Planning    Therapeutic Interventions/Treatment Strategies:  Assist clients in establishing / strengthening support network  Assist with discharge planning  Engage in safety planning when indicated  Facilitate increased self awareness  Provide feedback about social skills  Teach adaptive coping skills and communication skills  Use reality based supportive approach    Response to Treatment Strategies:  Accepted Feedback, Gave Feedback, Listened  and Focused on Goals    Name of Groups:  States of Mind - Time: 10-10:50; Self Awareness 1-1:50    Description and Therapeutic Outcome:     During States of Mind group, Jose presented as social and easily elaborated on the content with positive feedback. He identifies having a Reasonable Mind and being able to rationalize. He will focus on further development of His Wise Mind, continue with Awareness of the 5 Why's, implement the AAA; In this Moment; Acceptance; Hello Emotion; 5 to 1; and 3 C's. He responds that the skills are becoming a norm for him and realizes he is beginning to apply them involuntarily.    During the Self Awareness group, Jose easily engaged. \"This group is my take from today.\" He appreciated getting to know his peers through the exercise. He will continue to practice the THINK; self acceptance and Drop the Rope. Jose presents as intentional and eager to work on his " wellness and remains appropriate for PHP.    Is this a Weekly Review of the Progress on the Treatment Plan?  NO    Are Treatment Plan Goals being addressed?  YES      Are Treatment Plan Strategies to Address Goals Effective?  YES      Are there any current contracts in place?  YES

## 2023-02-20 ENCOUNTER — HOSPITAL ENCOUNTER (EMERGENCY)
Facility: HOSPITAL | Age: 28
End: 2023-02-20
Payer: COMMERCIAL

## 2023-02-20 ENCOUNTER — HOSPITAL ENCOUNTER (EMERGENCY)
Facility: HOSPITAL | Age: 28
Discharge: HOME | End: 2023-02-20
Attending: PHYSICIAN ASSISTANT | Admitting: PHYSICIAN ASSISTANT
Payer: COMMERCIAL

## 2023-02-20 ENCOUNTER — HOSPITAL ENCOUNTER (OUTPATIENT)
Dept: BEHAVIORAL HEALTH | Facility: HOSPITAL | Age: 28
Discharge: HOME | End: 2023-02-20
Attending: PSYCHIATRY & NEUROLOGY
Payer: COMMERCIAL

## 2023-02-20 VITALS
SYSTOLIC BLOOD PRESSURE: 124 MMHG | DIASTOLIC BLOOD PRESSURE: 78 MMHG | RESPIRATION RATE: 18 BRPM | OXYGEN SATURATION: 98 % | TEMPERATURE: 97.2 F | HEART RATE: 61 BPM

## 2023-02-20 DIAGNOSIS — T33.822D FROSTBITE OF BOTH FEET, SUBSEQUENT ENCOUNTER: ICD-10-CM

## 2023-02-20 DIAGNOSIS — T33.821D FROSTBITE OF BOTH FEET, SUBSEQUENT ENCOUNTER: ICD-10-CM

## 2023-02-20 DIAGNOSIS — F41.1 GENERALIZED ANXIETY DISORDER: ICD-10-CM

## 2023-02-20 DIAGNOSIS — F33.2 MAJOR DEPRESSIVE DISORDER, RECURRENT SEVERE WITHOUT PSYCHOTIC FEATURES (H): Primary | ICD-10-CM

## 2023-02-20 PROCEDURE — G0463 HOSPITAL OUTPT CLINIC VISIT: HCPCS

## 2023-02-20 PROCEDURE — 99213 OFFICE O/P EST LOW 20 MIN: CPT | Performed by: PHYSICIAN ASSISTANT

## 2023-02-20 PROCEDURE — H0035 MH PARTIAL HOSP TX UNDER 24H: HCPCS | Performed by: SOCIAL WORKER

## 2023-02-20 PROCEDURE — H0035 MH PARTIAL HOSP TX UNDER 24H: HCPCS

## 2023-02-20 PROCEDURE — G0463 HOSPITAL OUTPT CLINIC VISIT: HCPCS | Mod: 25

## 2023-02-20 RX ORDER — BUSPIRONE HYDROCHLORIDE 10 MG/1
10 TABLET ORAL 3 TIMES DAILY
Qty: 90 TABLET | Refills: 0 | Status: SHIPPED | OUTPATIENT
Start: 2023-02-20 | End: 2023-08-29

## 2023-02-20 ASSESSMENT — ENCOUNTER SYMPTOMS
SEIZURES: 0
FACIAL ASYMMETRY: 0
COUGH: 0
TREMORS: 0
EYE REDNESS: 0
CONFUSION: 0
FEVER: 0
FACIAL SWELLING: 0
VOMITING: 0

## 2023-02-20 ASSESSMENT — ACTIVITIES OF DAILY LIVING (ADL): ADLS_ACUITY_SCORE: 37

## 2023-02-20 NOTE — PROGRESS NOTES
Group Therapy Progress Notes     Client Initial Individualized Goals for Treatment: Will increase effective use of support / increase ability to ask for help. Identify attitudes or beliefs about asking for help that interfere with your ability to ask for help and identify more helpful attitudes, Identify providers you will continue to see for individual therapy, psychiatric care, group therapy, or other specialized providers. and Create a return to work, return to school, or return to daily routines plan.    Area of Treatment Focus:  Community Resources / Support and Discharge Planning    Therapeutic Interventions/Treatment Strategies:  Assist clients in establishing / strengthening support network  Assist with discharge planning  Engage in safety planning when indicated  Facilitate increased self awareness  Provide feedback about social skills  Teach adaptive coping skills and communication skills  Use reality based supportive approach    Response to Treatment Strategies:  Accepted Feedback, Gave Feedback, Listened , Focused on Goals, Attentive and Accepted Support    Name of Groups:  Sleep - Time: 11:10-12:00    Description and Therapeutic Outcome:   OT Life Skills Group - Healthy Sleep Habits  Clients will explore healthy sleep habits to utilize as well as look at their current sleep habits. Clients will identify importance of sleep for overall physical and mental health, receive education on how lack of sleep affects the body, identify barriers to healthy sleep, develop a healthy sleep routine, and utilize handouts and sleep diary to track sleep habits and daily routines affecting sleep.  In OT group today, Jose presents as bright and engaged.  States that his sleep is pretty.  Admits to recently making some sleep hygiene changes which has improved his sleep.  Also states he is on the list to a sleep study as well.        Is this a Weekly Review of the Progress on the Treatment Plan?  NO    Are Treatment Plan  Goals being addressed?  YES      Are Treatment Plan Strategies to Address Goals Effective?  YES      Are there any current contracts in place?  YES

## 2023-02-20 NOTE — ED TRIAGE NOTES
Patient presents to urgent care for frostbite on bilateral toes that he got back on Dec 19, 2022. He states his toenails are black and falling off and was told he should have them checked.

## 2023-02-20 NOTE — PROGRESS NOTES
"Group Therapy Progress Notes     Client Initial Individualized Goals for Treatment:     Will increase effective use of support / increase ability to ask for help. Identify attitudes or beliefs about asking for help that interfere with your ability to ask for help and identify more helpful attitudes, Identify providers you will continue to see for individual therapy, psychiatric care, group therapy, or other specialized providers. and Create a return to work, return to school, or return to daily routines plan.    Area of Treatment Focus:  Community Resources / Support and Discharge Planning    Therapeutic Interventions/Treatment Strategies:  Assist with discharge planning  Engage in safety planning when indicated  Facilitate increased self awareness  Provide feedback about social skills  Teach adaptive coping skills and communication skills  Use reality based supportive approach    Response to Treatment Strategies:  Accepted Feedback, Gave Feedback, Listened  and Focused on Goals    Name of Groups:  Mindfulness - Time: 10-10:50; 1-1:50    Description and Therapeutic Outcome:     During Mindfulness group, Jose presented as bright, easily participatory, and providing positive feedback to the group process. He is familiar with the concept taught and has been practicing: meditation, implementing the 5 to 1, the Now I am which he personalizes with \"In this Moment\". He is also practicing Guided Imagery; T.H.I.N.K. and Gratitude.     During the follow up Mindfulness group, Jose identified positives for that support him staying in the Present Moment: Do It Slowly - doing things at a relaxed and calm pace; Tell myself: Now I Am - 'In this Moment'; Minimize what I let in to my head early in the day; Breathing. Jose conteinues to present as intentional and diligent regarding his wellness. This is evidenced in him always reviewing the daily handouts as well as any others that were not covered in group. He remains " appropriate for PHP.    Is this a Weekly Review of the Progress on the Treatment Plan?    YES Continue Treatment Goal    Are Treatment Plan Goals being addressed?    YES      Are Treatment Plan Strategies to Address Goals Effective?  YES      Are there any current contracts in place?  YES

## 2023-02-20 NOTE — ED PROVIDER NOTES
History     Chief Complaint   Patient presents with     Frostbite     HPI  Jose Alexander is a 27 year old male who reports that he sustained frostbite to his bilateral midfoot and toes on December 19, 2022.  Since then he has noticed some pain and continued discoloration.  He has redness from the mid foot extending distally to his toes.  His left great toe has a blackened toenail.  He reports all of his friends are concerned that he may be losing his toes.  He denies any other issues.    Allergies:  No Known Allergies    Problem List:    Patient Active Problem List    Diagnosis Date Noted     Morbid obesity (H) 01/23/2023     Priority: Medium     Strep pharyngitis 12/31/2022     Priority: Medium     Frostbite of both feet 12/20/2022     Priority: Medium     Suicide attempt (H) 11/08/2021     Priority: Medium     Suicidal overdose, subsequent encounter 11/08/2021     Priority: Medium     Moderate episode of recurrent major depressive disorder (H) 09/14/2021     Priority: Medium     Major depressive disorder, recurrent severe without psychotic features (H) 09/14/2021     Priority: Medium     Anxiety disorder 06/04/2019     Priority: Medium     BPPV (benign paroxysmal positional vertigo) 04/10/2012     Priority: Medium     Formatting of this note might be different from the original.  IMO Update 10/11       Pes planovalgus 08/26/2010     Priority: Medium     Other acne 03/31/2010     Priority: Medium     Seborrheic dermatitis, unspecified 03/31/2010     Priority: Medium     Formatting of this note might be different from the original.  IMO Update 10/11       Bilateral knee pain 01/08/2009     Priority: Medium     Headache 11/11/2005     Priority: Medium     Formatting of this note might be different from the original.  Could be sinus allergies  IMO Update 10 2016          Past Medical History:    No past medical history on file.    Past Surgical History:    No past surgical history on file.    Family History:     No family history on file.    Social History:  Marital Status:  Single [1]        Medications:    busPIRone (BUSPAR) 10 MG tablet  diclofenac (VOLTAREN) 1 % topical gel  DULoxetine (CYMBALTA) 30 MG capsule  gabapentin (NEURONTIN) 800 MG tablet  ibuprofen (ADVIL/MOTRIN) 400 MG tablet  vitamin D3 (CHOLECALCIFEROL) 1.25 MG (48306 UT) capsule          Review of Systems   Constitutional: Negative for fever.   HENT: Negative for drooling and facial swelling.    Eyes: Negative for redness.   Respiratory: Negative for cough.    Gastrointestinal: Negative for vomiting.   Musculoskeletal:        History of bilateral midfoot and toe frostbite now with continued redness   Skin: Negative for pallor.   Neurological: Negative for tremors, seizures and facial asymmetry.   Psychiatric/Behavioral: Negative for confusion.   All other systems reviewed and are negative.      Physical Exam   BP: 124/78  Pulse: 61  Temp: 97.2  F (36.2  C)  Resp: 18  SpO2: 98 %      Physical Exam  Vitals and nursing note reviewed.   Constitutional:       General: He is not in acute distress.     Appearance: Normal appearance. He is not ill-appearing or toxic-appearing.   HENT:      Head: Normocephalic.      Nose: Nose normal.   Eyes:      General: No scleral icterus.     Extraocular Movements: Extraocular movements intact.   Neck:      Trachea: No tracheal deviation.   Cardiovascular:      Rate and Rhythm: Normal rate.   Pulmonary:      Effort: Pulmonary effort is normal. No respiratory distress.   Musculoskeletal:         General: Normal range of motion.      Cervical back: Normal range of motion.      Comments: He has redness from the mid foot extending distally to his toes.  His left great toe has a blackened toenail.   Otherwise his capillary refill appears to be intact.  His toes are warm to the touch.  And otherwise he is neurologically intact distally.   Skin:     General: Skin is warm and dry.      Coloration: Skin is not jaundiced or pale.    Neurological:      General: No focal deficit present.      Mental Status: He is alert and oriented to person, place, and time.   Psychiatric:         Attention and Perception: Attention normal.         Mood and Affect: Mood normal.         ED Course     Mental Health Risk Assessment      PSS-3    Date and Time Over the past 2 weeks have you felt down, depressed, or hopeless? Over the past 2 weeks have you had thoughts of killing yourself? Have you ever attempted to kill yourself? When did this last happen? User   02/20/23 1440 no no yes between 1 and 6 months ago CP                       No results found for this or any previous visit (from the past 24 hour(s)).    Medications - No data to display    Assessments & Plan (with Medical Decision Making)     I have reviewed the nursing notes.    I have reviewed the findings, diagnosis, plan and need for follow up with the patient.        New Prescriptions    No medications on file       Final diagnoses:   Frostbite of both feet, subsequent encounter     Jose Alexander is a 27 year old male who reports that he sustained frostbite to his bilateral midfoot and toes on December 19, 2022.  Since then he has noticed some pain and continued discoloration.  He has redness from the mid foot extending distally to his toes.  His left great toe has a blackened toenail.  He reports all of his friends are concerned that he may be losing his toes.  He denies any other issues.  Physical exam shows him to be afebrile.  Vital signs stable.  He has redness from the mid foot extending distally to his toes.  His left great toe has a blackened toenail.   Otherwise his capillary refill appears to be intact.  His toes are warm to the touch.  And otherwise he is neurologically intact distally.  I discussed possible imaging with Dr. Pérez who reports there is really no good imaging for the distal toes.  Referral was made for vascular surgery evaluation on outpatient basis.  I called  [facility] and hopefully they will call this patient to arrange close follow-up.    2/20/2023   [facility]     Dennis Johnson PA-C  02/20/23 1511

## 2023-02-20 NOTE — PROGRESS NOTES
"Group Therapy Progress Notes     Client Initial Individualized Goals for Treatment: Will increase effective use of support / increase ability to ask for help. Identify attitudes or beliefs about asking for help that interfere with your ability to ask for help and identify more helpful attitudes, Identify providers you will continue to see for individual therapy, psychiatric care, group therapy, or other specialized providers. and Create a return to work, return to school, or return to daily routines plan.    Area of Treatment Focus:  Community Resources / Support and Discharge Planning    Therapeutic Interventions/Treatment Strategies:  Assist clients in establishing / strengthening support network  Assist with discharge planning  Engage in safety planning when indicated  Facilitate increased self awareness  Provide feedback about social skills  Teach adaptive coping skills and communication skills  Use reality based supportive approach    Response to Treatment Strategies:  Accepted Feedback, Gave Feedback, Listened , Focused on Goals, Attentive and Accepted Support    Name of Groups:  Psychotherapy wrap up group - 2-3    Description and Therapeutic Outcome:   In psychotherapy wrap up group, Jose reviewed his take away from today.  Reviewed mindfulness, some grief and loss as well as sleep habits.  He engaged well and reports he likes adding more techniques to help him stay in the present moment.  Reports using skills that help him to slow down, minimize emails and texts, deep breathing really helps and learning to say no.  He also has been practicing \"in this moment\" and 5:1 - really likes these skills and is trying to practice them when he is feeling good.  Good discussion about One thing at a time as he struggles with this skill.  He continues to engage spontaneously - he has shifted at times into discussion about the other group members and possible deflecting talking about himself - he does accept gentle " redirection but does need this redirection a few times during the group.  Will gulshan to bring his awareness to this.  Reviewed evening plans - no safety issues noted - he remains appropriate for PHP.       Is this a Weekly Review of the Progress on the Treatment Plan?  NO    Are Treatment Plan Goals being addressed?  YES      Are Treatment Plan Strategies to Address Goals Effective?  YES      Are there any current contracts in place?  YES

## 2023-02-21 ENCOUNTER — HOSPITAL ENCOUNTER (OUTPATIENT)
Dept: BEHAVIORAL HEALTH | Facility: HOSPITAL | Age: 28
Discharge: HOME | End: 2023-02-21
Attending: PSYCHIATRY & NEUROLOGY
Payer: COMMERCIAL

## 2023-02-21 DIAGNOSIS — T33.90XS FROSTBITE, SEQUELA: Primary | ICD-10-CM

## 2023-02-21 PROCEDURE — H0035 MH PARTIAL HOSP TX UNDER 24H: HCPCS

## 2023-02-21 PROCEDURE — H0035 MH PARTIAL HOSP TX UNDER 24H: HCPCS | Performed by: SOCIAL WORKER

## 2023-02-21 NOTE — PROGRESS NOTES
Group Therapy Progress Notes     Client Initial Individualized Goals for Treatment: Will increase effective use of support / increase ability to ask for help. Identify attitudes or beliefs about asking for help that interfere with your ability to ask for help and identify more helpful attitudes, Identify providers you will continue to see for individual therapy, psychiatric care, group therapy, or other specialized providers. and Create a return to work, return to school, or return to daily routines plan.    Area of Treatment Focus:  Community Resources / Support and Discharge Planning    Therapeutic Interventions/Treatment Strategies:  Assist clients in establishing / strengthening support network  Assist with discharge planning  Engage in safety planning when indicated  Facilitate increased self awareness  Provide feedback about social skills  Teach adaptive coping skills and communication skills  Use reality based supportive approach    Response to Treatment Strategies:  Accepted Feedback, Gave Feedback, Listened , Focused on Goals, Attentive and Accepted Support    Name of Groups:  Pain Management - Time: 11:10-12:00    Description and Therapeutic Outcome:   Pain Management  OT life skills group with focus on pain management and its relation to physical and emotional pain.   Education provided on tips to manage both emotional and physical pain, including the anti- inflammatory diet, adapting your home environment to reduce pain and manage fatigue, creating a healthy support network and socializing.  Handout given with focus on balance and arthritis friendly exercises, body scan and body awareness. In OT group today, Jose is focused and engaged.  States that has some physical pain with his feet and back pain but majority of pain is emotional pain.  States that he has been working in his self care and his emotional pain has improved.         Is this a Weekly Review of the Progress on the Treatment  Plan?  NO    Are Treatment Plan Goals being addressed?  YES      Are Treatment Plan Strategies to Address Goals Effective?  YES      Are there any current contracts in place?  YES

## 2023-02-21 NOTE — PROGRESS NOTES
Group Therapy Progress Notes     Client Initial Individualized Goals for Treatment:     Will increase effective use of support / increase ability to ask for help. Identify attitudes or beliefs about asking for help that interfere with your ability to ask for help and identify more helpful attitudes, Identify providers you will continue to see for individual therapy, psychiatric care, group therapy, or other specialized providers. and Create a return to work, return to school, or return to daily routines plan.    Area of Treatment Focus:  Community Resources / Support and Discharge Planning    Therapeutic Interventions/Treatment Strategies:  Assist with discharge planning  Facilitate increased self awareness  Provide feedback about social skills  Teach adaptive coping skills and communication skills  Use reality based supportive approach    Response to Treatment Strategies:  Accepted Feedback, Gave Feedback, Listened  and Focused on Goals    Name of Groups:  Anxiety - Time: 10-10:50; 1-1:50    Description and Therapeutic Outcome:     The topic on Anxiety was familiar to Jose. He identified his Anxiety symptoms as: Craving for food. He also referred to being frustrated at the ER yesterday and had the urge to Default to food. He did not, and continues to implement Awareness; 5 to 1; Gratitude; the GAP and Present Moment. Other symptoms: Headaches; Light Headedness; Palpitations; Inadequacy; Guilt; Lying;  Hypochondrias.    During the follow up group, Jose identified skills that he will implement to cope with Anxiety: Learn Acceptance; Focus on the future; build positive relationships; avoid  Isolation; ask for help when I need it. Jose continues to present as intentional regarding his wellness and remains appropriate for PHP.    Is this a Weekly Review of the Progress on the Treatment Plan?  NO    Are Treatment Plan Goals being addressed?  YES      Are Treatment Plan Strategies to Address Goals  Effective?  YES      Are there any current contracts in place?  YES

## 2023-02-22 ENCOUNTER — HOSPITAL ENCOUNTER (OUTPATIENT)
Dept: BEHAVIORAL HEALTH | Facility: HOSPITAL | Age: 28
Discharge: HOME | End: 2023-02-22
Attending: PSYCHIATRY & NEUROLOGY
Payer: COMMERCIAL

## 2023-02-22 NOTE — PROGRESS NOTES
Group Therapy Progress Notes     Client Initial Individualized Goals for Treatment: Will increase effective use of support / increase ability to ask for help. Identify attitudes or beliefs about asking for help that interfere with your ability to ask for help and identify more helpful attitudes, Identify providers you will continue to see for individual therapy, psychiatric care, group therapy, or other specialized providers. and Create a return to work, return to school, or return to daily routines plan.    Area of Treatment Focus:  Community Resources / Support and Discharge Planning    Therapeutic Interventions/Treatment Strategies:  Assist clients in establishing / strengthening support network  Assist with discharge planning  Engage in safety planning when indicated  Facilitate increased self awareness  Provide feedback about social skills  Teach adaptive coping skills and communication skills  Use reality based supportive approach    Response to Treatment Strategies:  Accepted Feedback, Gave Feedback, Listened , Focused on Goals, Attentive and Accepted Support    Name of Groups:  Psychotherapy wrap up group - 2-3    Description and Therapeutic Outcome:   In psychotherapy wrap up group, Jose reviewed his take away from today.  Reviewed ANTS - automatic negative thoughts and interruption techniques.  Also discussed brain functioning and activities that help the brain work better.  Jose reported awareness of having many ANTS to work on - good discussion about using awareness as starting point and not as a reason to get down on himself.  Skills he can use for interruption include Know your truth, I'm worth it, Present moment, AAA.  Discussed how he has been practicing interruption skills on a daily basis to build his confidence.  Also discussed positive activities for the brain - exercise, sun, completing a task, watching comedy, pets.  Plans to take more walks in the wood and be mindful of the beauty - good  discussion about being mindful of surroundings instead of being caught up with thoughts in his head - being intentional.  Good group for ana - engaged well and allowed others to give feedback as well.  Reviewed evening plans - no safety issues noted - plans to practice self care tonight - he remains appropriate for PHP.       Is this a Weekly Review of the Progress on the Treatment Plan?  NO    Are Treatment Plan Goals being addressed?  YES      Are Treatment Plan Strategies to Address Goals Effective?  YES      Are there any current contracts in place?  YES

## 2023-02-22 NOTE — PROGRESS NOTES
Group Therapy Progress Notes     Client Initial Individualized Goals for Treatment:     Will increase effective use of support / increase ability to ask for help. Identify attitudes or beliefs about asking for help that interfere with your ability to ask for help and identify more helpful attitudes, Identify providers you will continue to see for individual therapy, psychiatric care, group therapy, or other specialized providers. and Create a return to work, return to school, or return to daily routines plan.    Area of Treatment Focus:  Community Resources / Support and Discharge Planning    Therapeutic Interventions/Treatment Strategies:  Facilitate increased self awareness  Provide feedback about social skills  Teach adaptive coping skills and communication skills  Use reality based supportive approach    Response to Treatment Strategies:  Accepted Feedback, Gave Feedback, Listened  and Focused on Goals    Name of Groups:  Trauma - Time: 10-10:50; A.N.T.'s - Automatic Negative Thoughts 1-1:50    Description and Therapeutic Outcome:     During Trauma group, Jose presented with a broad affect. He grasps the concept of PTG - Post Traumatic Growth, being a positive approach rather than PTSD which could focus on the Disorder. He is Aware of focusing on the past and is working on the Present Moment, Gratitude;5 to 1; Smile when waking up; as well as Meditation.     During the Automatic Negative Thoughts (A.N.T.'s) group, Jose identified his 'ANTS's as : Personalizing; Catastrophizing; Emotional Reasoning; Mind-Reading; Fortune Telling. He recognized the Fortune Telling relating to intimate relationships and Trust being an issue for him. He will continue to focus on the Present Moment; the Now; Now I Am. Jose also volunteered to engage in a Mindfulness exercise. He found the exercise challenging, but grasped the concept of the constant voice of the Mind and the importance of interrupting. He continues to  present as intentional regarding his wellness and remains appropriate for PHP.    Is this a Weekly Review of the Progress on the Treatment Plan?  NO    Are Treatment Plan Goals being addressed?  YES      Are Treatment Plan Strategies to Address Goals Effective?  YES      Are there any current contracts in place?  YES

## 2023-02-22 NOTE — PROGRESS NOTES
Group Therapy Progress Notes     Client Initial Individualized Goals for Treatment:     Will increase effective use of support / increase ability to ask for help. Identify attitudes or beliefs about asking for help that interfere with your ability to ask for help and identify more helpful attitudes, Identify providers you will continue to see for individual therapy, psychiatric care, group therapy, or other specialized providers. and Create a return to work, return to school, or return to daily routines plan.    Area of Treatment Focus:  Community Resources / Support and Discharge Planning    Therapeutic Interventions/Treatment Strategies:  Assist clients in establishing / strengthening support network  Assist with discharge planning  Engage in safety planning when indicated  Facilitate increased self awareness  Provide feedback about social skills  Teach adaptive coping skills and communication skills  Use reality based supportive approach    Response to Treatment Strategies:  Accepted Feedback, Gave Feedback, Listened , Focused on Goals, Attentive and Accepted Support    Name of Groups:  Exercise - Time: 11:10-12:00    Description and Therapeutic Outcome:   Exercise  OT life skills group with focus on the benefits of exercise.  Education provided with handouts on positive effects of exercise and mental health, different activities to try, ways to improve follow through, how to take your pulse, and target heart rates for age.  Worksheets provided for clients to create individualized exercise goals and exercise log. Theraband is offered with demonstrations as well as a handouts with specific theraband home exercise program guidelines.      Jose provided with a Take5 t-band. He notes that he engages in an exercise routine. Jose participated well in group exercise routine.        Is this a Weekly Review of the Progress on the Treatment Plan?  NO    Are Treatment Plan Goals being addressed?  YES      Are  Treatment Plan Strategies to Address Goals Effective?  YES      Are there any current contracts in place?  YES

## 2023-02-23 ENCOUNTER — HOSPITAL ENCOUNTER (OUTPATIENT)
Dept: BEHAVIORAL HEALTH | Facility: HOSPITAL | Age: 28
Discharge: HOME | End: 2023-02-23
Attending: PSYCHIATRY & NEUROLOGY
Payer: COMMERCIAL

## 2023-02-23 NOTE — PROGRESS NOTES
Group Therapy Progress Notes     Client Initial Individualized Goals for Treatment: Will increase effective use of support / increase ability to ask for help. Identify attitudes or beliefs about asking for help that interfere with your ability to ask for help and identify more helpful attitudes, Identify providers you will continue to see for individual therapy, psychiatric care, group therapy, or other specialized providers. and Create a return to work, return to school, or return to daily routines plan.     Area of Treatment Focus:  Community Resources / Support and Discharge Planning     Therapeutic Interventions/Treatment Strategies:  Assist clients in establishing / strengthening support network  Assist with discharge planning  Engage in safety planning when indicated  Facilitate increased self awareness  Provide feedback about social skills  Teach adaptive coping skills and communication skills  Use reality based supportive approach     Response to Treatment Strategies:  Accepted Feedback, Gave Feedback, Listened , Focused on Goals, Attentive and Accepted Support     Name of Groups:  Psychotherapy Group 9-10 AM      Description and Therapeutic Outcome:     During Psychotherapy Group Jose presented bright and engaging in conversation. He is present throughout group and intentional regarding his discussions. He shares last night was a night of self care where he played his new video game all night before bed. He reports good sleep as well. No issues or concerns regarding Jose and he continues to practice his skills: Mindfulness, Grateful For's, Present Moment, and 3 C's.      Is this a Weekly Review of the Progress on the Treatment Plan?  NO     Are Treatment Plan Goals being addressed?  YES        Are Treatment Plan Strategies to Address Goals Effective?  YES        Are there any current contracts in place?  YES

## 2023-02-23 NOTE — PROGRESS NOTES
"Group Therapy Progress Notes     Client Initial Individualized Goals for Treatment:     Will increase effective use of support / increase ability to ask for help. Identify attitudes or beliefs about asking for help that interfere with your ability to ask for help and identify more helpful attitudes, Identify providers you will continue to see for individual therapy, psychiatric care, group therapy, or other specialized providers. and Create a return to work, return to school, or return to daily routines plan.    Area of Treatment Focus:  Community Resources / Support and Discharge Planning    Therapeutic Interventions/Treatment Strategies:  Facilitate increased self awareness  Provide feedback about social skills  Teach adaptive coping skills and communication skills  Use reality based supportive approach    Response to Treatment Strategies:  Accepted Feedback, Gave Feedback, Listened  and Focused on Goals    Name of Groups:  Suicide - Time: 10-10:50; Goals 1-1:50    Description and Therapeutic Outcome:     During Suicide group, Jose presented as initially responsive, though became aloof when the word \"beliefs\" was employed. After group, he identified that he did not want to engage in a \"Latter day\" discussion. Brought awareness to Jose that 'suicide being selfish or not selfish and thinking from upbringing and childhood' were the only beliefs discussed.    During the Goals group, Jose presented as relaxed and participatory. He identified things he wants to:    START doing    : Mindfulness nature walks; eat more calcium; He will apply 'Just Do It'.  KEEP doing      : Dieting;Reading. He will apply Thriving of the Focused; Me Time; Self Care.  STOP doing       : Thinking of the Past; Saying Sorry; Negative Self Talk.  He will apply Drop the Rope; Awareness; Be Intentional; I am Winn It.    Jose continues to present as intentional regarding his wellness and remains appropriate for PHP.    Is this a Weekly " Review of the Progress on the Treatment Plan?  NO    Are Treatment Plan Goals being addressed?  YES      Are Treatment Plan Strategies to Address Goals Effective?  YES      Are there any current contracts in place?  YES

## 2023-02-23 NOTE — PROGRESS NOTES
Group Therapy Progress Notes     Client Initial Individualized Goals for Treatment: Will increase effective use of support / increase ability to ask for help. Identify attitudes or beliefs about asking for help that interfere with your ability to ask for help and identify more helpful attitudes, Identify providers you will continue to see for individual therapy, psychiatric care, group therapy, or other specialized providers. and Create a return to work, return to school, or return to daily routines plan.    Area of Treatment Focus:  Community Resources / Support and Discharge Planning    Therapeutic Interventions/Treatment Strategies:  Assist clients in establishing / strengthening support network  Assist with discharge planning  Engage in safety planning when indicated  Facilitate increased self awareness  Provide feedback about social skills  Teach adaptive coping skills and communication skills  Use reality based supportive approach    Response to Treatment Strategies:  Accepted Feedback, Gave Feedback, Listened , Focused on Goals, Attentive and Accepted Support    Name of Groups:  Nutrition - Time: 11:10-12:00    Description and Therapeutic Outcome:   OT wellness group on Nutrition (diet/healthy fod)  This group explores a variety of topics and educates on the relationship between physical health and mental health and how these aspects support the other.  Clients will learn skills to help with utilizing a balanced diet for health management.  Clients will increase knowledge and awareness on how exercise supports self care and wellness, identify barriers to healthy food choices, and will develop a feasible plan to obtain a healthy meal plan.  Clients will learn how to read Nutrition Facts Labels and will develop mindfulness skills related to eating.  In OT group today, Jose is focused and engaged.  States that he has recently improved his eating habits and has lost some weight which he is happy about.   Acknowledges the positive change in his mental health since making these diet changes.        Is this a Weekly Review of the Progress on the Treatment Plan?  NO    Are Treatment Plan Goals being addressed?  YES      Are Treatment Plan Strategies to Address Goals Effective?  YES      Are there any current contracts in place?  YES

## 2023-02-24 ENCOUNTER — HOSPITAL ENCOUNTER (OUTPATIENT)
Dept: BEHAVIORAL HEALTH | Facility: HOSPITAL | Age: 28
Discharge: HOME | End: 2023-02-24
Attending: PSYCHIATRY & NEUROLOGY
Payer: COMMERCIAL

## 2023-02-24 ENCOUNTER — OFFICE VISIT (OUTPATIENT)
Dept: WOUND CARE | Facility: OTHER | Age: 28
End: 2023-02-24
Attending: NURSE PRACTITIONER
Payer: COMMERCIAL

## 2023-02-24 VITALS
HEIGHT: 72 IN | SYSTOLIC BLOOD PRESSURE: 112 MMHG | BODY MASS INDEX: 37.11 KG/M2 | WEIGHT: 274 LBS | HEART RATE: 73 BPM | TEMPERATURE: 97.3 F | OXYGEN SATURATION: 96 % | DIASTOLIC BLOOD PRESSURE: 74 MMHG

## 2023-02-24 DIAGNOSIS — T33.90XS FROSTBITE, SEQUELA: ICD-10-CM

## 2023-02-24 DIAGNOSIS — F17.200 NICOTINE DEPENDENCE, UNCOMPLICATED, UNSPECIFIED NICOTINE PRODUCT TYPE: Primary | ICD-10-CM

## 2023-02-24 PROCEDURE — G0463 HOSPITAL OUTPT CLINIC VISIT: HCPCS

## 2023-02-24 PROCEDURE — G0463 HOSPITAL OUTPT CLINIC VISIT: HCPCS | Mod: 25

## 2023-02-24 PROCEDURE — 99406 BEHAV CHNG SMOKING 3-10 MIN: CPT | Performed by: NURSE PRACTITIONER

## 2023-02-24 PROCEDURE — 99213 OFFICE O/P EST LOW 20 MIN: CPT | Performed by: NURSE PRACTITIONER

## 2023-02-24 ASSESSMENT — PAIN SCALES - GENERAL: PAINLEVEL: NO PAIN (0)

## 2023-02-24 ASSESSMENT — ANXIETY QUESTIONNAIRES
5. BEING SO RESTLESS THAT IT IS HARD TO SIT STILL: SEVERAL DAYS
IF YOU CHECKED OFF ANY PROBLEMS ON THIS QUESTIONNAIRE, HOW DIFFICULT HAVE THESE PROBLEMS MADE IT FOR YOU TO DO YOUR WORK, TAKE CARE OF THINGS AT HOME, OR GET ALONG WITH OTHER PEOPLE: NOT DIFFICULT AT ALL
3. WORRYING TOO MUCH ABOUT DIFFERENT THINGS: NOT AT ALL
7. FEELING AFRAID AS IF SOMETHING AWFUL MIGHT HAPPEN: NOT AT ALL
GAD7 TOTAL SCORE: 2
6. BECOMING EASILY ANNOYED OR IRRITABLE: NOT AT ALL
GAD7 TOTAL SCORE: 2
1. FEELING NERVOUS, ANXIOUS, OR ON EDGE: NOT AT ALL
2. NOT BEING ABLE TO STOP OR CONTROL WORRYING: NOT AT ALL

## 2023-02-24 ASSESSMENT — PATIENT HEALTH QUESTIONNAIRE - PHQ9
5. POOR APPETITE OR OVEREATING: SEVERAL DAYS
SUM OF ALL RESPONSES TO PHQ QUESTIONS 1-9: 2
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
## 2023-02-24 NOTE — DISCHARGE SUMMARY
Adult Partial Hospitalization Program Discharge Summary/Instructions      Patient: Jose Alexander    MRN: [id number]  : 1995    Age: 27 year old Sex: male    Admission Date: 23  Discharge Date: 23  Diagnosis: 296.33 (F33.2) Major Depressive Disorder, Recurrent Episode, Severe _ and With anxious distress;  Generalized Anxiety Disorder      Client Strengths:  caring, creative, educated, empathetic, employed, goal-focused, good listener, has a previous history of therapy, insightful, intelligent, motivated, open to learning, open to suggestions / feedback, support of family, friends and providers, supportive, wants to learn, willing to ask questions, willing to relate to others and work history      Long-Term Goals:  Knowledge about illness and management of symptoms   Maintenance of personal safety       Discharge Criteria:  Satisfactory progress toward treatment goals   Improvement re: identified problems and symptoms   Ability to continue recovery at next level of service   Has a discharge plan in place   Regular attendance as scheduled       Area of Treatment Focus:  Progress   Personal Safety, Symptom Stabilization and Management and Community Resources / Support and Discharge Planning    Personal Safety/Symptom Stabilization and Management:  Jose had a great first week of PHP programming, he has been engaged and motivated to learn the content of what is being taught in the groups.  He has been a strong participant and always offers to read and is quick to engaged in group discussion.  Jose has been a strong addition to this group and offers much support and compassion to his peers.      During Shame group, Jose owns Shame as an area that is a challenge for him and valued the topic. Jose was able to differentiate between Guilt: A bad behavior and Shame: I am bad. He also identified his coping as : Withdrawal; turning the tables; lashing out; self put down;  avoidance through  "thrill seeking. \"I have to work on forgiving myself\". He also recognizes that \"I was the Lost Child\" in the Family Dynamics concept. In the Coping with Shame group, Jose identified skills that he will implement to cope with Shame: the THE skill to not be the behavior as it is THE behavior and that he is not the behavior; Rewire to have healthy thinking; Watch my Words; Drop the Rope regarding the past; Self Compassion; Be active. He will continue to practice Awareness and implement the AAA - Awareness Acceptance Action as well as Present Moment.      In Self Disclosure group, Jose easily participated in the exercise relating to positives, strengths, and areas he wants to develop. Some of the strengths he identified were: spiritual; happy; loving; good boss; boundaries; hopeful; hardworking; caring, positive thinking; curious, reader. He accepted positives from peers and also shifted his 'surviving' to \"thriving\" and plans to work on 'confidence\". Jose was supportive of peers in adding to and supporting their positives.  During Gratitude group, Jose grasped the concept of saying \"Thank you\" rather than \"Sorry\". He identified addressing the sadness relating to death, rather than \"thanks for trusting me with this difficult emotion\".      During Shame group, Jose brought out other points as a follow up from the Shame topic yesterday. He focused specifically on 'measuring up' to others. This especially related to working on measuring up to his  brother \"but my body paid the price\" due to excessive sports. He also accepts Shame being the thief of intimacy. Jose has a positive grasp on the Shame concept and will focus on : Drop the Rope; Letting Go; 3 C's; Present Moment; the THE skill; Know My Truth.  In Perfectionism group, Jose identified having Low Self Esteem. He is also aware that \"Pain comes from the difference between what is happening and what I think ought to be happening\". Jose will " "implement Self Compassion and identified that he will focus on the tools he now has with the skills; use his supports : Step mom and best friend; acceptance; self compassion.      In 3 Cycles of 21 Days group, Jose presented as alert, participatory and provided positive feedback to the group process, as well as to a graduating peer. He continues to work on Awareness and changing his \"I'm sorry to Thank you\". He grasps the concept of the 3 Cycles - beginning with an event and the outcome being a trauma due to not interrupting the process.  During the Self Awareness group, Jose continued to present as positive and engaged. He valued the concept of \"The trauma was real then, but now, it is only an illusion, carried through time via a train of thought\". Jose is also intent on rewiring negatives with positives: worthless - I am loved; ugly - I have great features. He will also practice: the THE skill; Lower Expectations; Mindfulness; Awareness. Jose presents as intentional regarding his wellness and remains appropriate for PHP.     In the next week we will focus on symptom management and introduce mindfulness skills and grounding techniques.  Good first week for Jose!    Symptom Stabilization and Management:  Jose has had a very productive week in PHP.  He is very engaged and is a strong participant in the program. Jose has been transparent and honest regarding hs mental danelle symptoms and how they affect him and is extremely diligent about practicing the coping skills outside of the PHP setting.  He also is a wonderful support to his peers and presents much insight into his symptoms and how the skills help him; he will use this as an example as to how a skill may support another peer.     In Radical Acceptance group, Jose identified having \"resentment\" toward his dad because he was the result of his dad's affair. \"I do accept the affair as I would otherwise not be here.\" He acknowledges Letting " "Go will take time. He will also implement the 3 C's; AAA- Awareness, Acceptance, action; Opposite Action/Opposite Emotion.  During the follow up group, Jose provided positive feedback to an emotional peer. He also engaged in the group process, though found the topic challenging. He will work on \"I AM.. and be mindful of what words follow his I Am \"as that is what I am going to be\". He will also focus on Know My Truth; Gratitude; accept life in general.      During Relationships group, Jose regularly provides positive feedback to the group process, as well being supportive of peers. He identified skills that will support relationship: Drop the Rope - this he is working on in regard to his bio dad. He will also implement : Drop the Rope; TFC - Think Feel Choose; Validate; 3 C's; Common Humanity, Just Like Me and Know My Truth.  During the follow up group, which related to the Hello Emotion skill, Jose grasped the concept. He will incorporate : Feelings just are; AAA- Awareness; Acceptance, Action; as well a the THE skill and Present Moment. A skill that was a big take-away from him today was \"Check your soil\". He recognizes that unhealthy soil will affect the outcome of the \"crop\".    In the Love Languages group, Jose identified his Love Language as Quality Time, and secondary as Physical Touch. He valued the 1:1 time with his mom yesterday when he intentionally practiced the 3-minute skill. Jose continues to present as intentional regarding his wellness and remains appropriate for PHP.     In Coping Skills group, Jose has a positive grasp of the skills and is able to elaborate on them with clarity. He continues to work on Present Moment, Time Limit and the 5 to 1 for Mindfulness. During the follow up group, Jose was able to illustrate skills that are significant to him: Boundaries; T.H.I.N.K.; Focused Mode; Thriving of the Focused; the Thisness, not Thatness concept. Awareness.      During Coping " "Skills group, with a focus on the \"No-No's\", Jose grasped the concept of No 'Why'. He understands the context in which it is being applied: 'What' happened, rather than 'Why' as why's can trigger densiveness and shaming.   During the Listening Skills group, Jose volunteered to participate in a role play. The exercise entailed focusing on reflecting and not giving advice. He was proficient in practicing this and is also proficient in skill use: Gratitude; Present Moment; AAA; Know Your Truth; 5 to 1; 3 Good Things. Jose continues to present as intentional regarding his wellness and remains appropriate for PHP.     Good week for Jose! He has quickly adopted many of the skills into his \"toolbox\" and continues to progress in the program. In the next week we will continue to focus on interrupting negative thoughts as well as decreasing depressive and anxiety based symptoms. Jose has been a wonderful addition to the PHP program!       Community Resources / Support and Discharge Planning:  Jose has been very diligent and motivated to continue to learn coping skills that help him interrupt ruminating and anxiety producing thoughts.   Throughout his 4 weeks in PHP, Jose has reported not experiencing SI and has felt safe. He self reports skills he apply's at home and in the community and continues to become more insightful about triggers and how to interrupt thoughts.     In the Coping Skills group, with a focus on the \"No-No's\", Jose presented as focused, social, and participatory, with insightful feedback. He grasped the concept of No 'Why'. He understands the context in which it is being applied: 'What' happened, rather than 'Why' as why's can trigger densiveness and shaming.  During the Listening Skills group, Jose volunteered to participate in a role play. The exercise entailed focusing on reflecting and not giving advice. He was proficient in practicing this and is also proficient in skill " "use: Gratitude; Present Moment; AAA; Know Your Truth; 5 to 1; 3 Good Things.     During Boundaries group, Jose presented as bright, engaged, and provided positive feedback to the group process. He related to the topic and identified mainly having Rigid Boundaries, moderate porous, and at least two Healthy Boundaries. Jose recognizes Self Esteem issues. During the discussion of Relationship Circles, Jose identified certain friends that 'are out to sea'. He will bring his therapist in to a closer [employer] as well as an acquaintance.  In the follow up group, Jose identified the value of having Boundaries: The line/boundary ensures my uniqueness, autonomy and privacy. I am able to be the way I really am, rather than the way people want me to be\". He will continue to practice and implement: Present Moment; 3 C's; AAA; Present Moment; T.H.I.N.K. and the 5 to 1. He is also personalizing the skills e.g for the Now I Am, he is using \"In this Moment\".      In Self Angelina group, Jose grasps the concept of Self Angelina relating to the Internal Locus and Self Esteem relating to being influenced by Externals. He presents as confident, identifies having positive Self Angelina and \"You can't say something worse to me than what  I've already said to myself\".     In the States of Mind group, Jose identified being more in his Reasonable Mind. He is aware of the influence that the Emotional Mind can have on decision-making. Jose identifies Spirituality as significant in his life. He doy es acknowledge comparing himself to others reflects \"unreal expectations\" and will work on Lowering Expectations. Jose will continue to implement: In this Moment; 3 C's; Gratitude; Present Moment; 5 to 1; Just Like Me and Guided Meditation. Jose presents as intentional regarding his wellness and remains appropriate for PHP.     During Identity group, Jose illustrated the 5 Why's concept and process. He acknowledges his job being " his Identity and the challenge of having no job. He is, however, applying for another job. He is also working  on  Hello Emotion. 5 to 1; In This Moment; and the 3 Minute skill. He identified : Life is a challenge, meet it; Life is a dream, realize it; Life is Love, enjoy it.   During Self Compassion group, Jose continues to present as positive and with a broad affect. He provided positive feedback to the topic and will continue to work and practice Self Compassion. He presents as intentional regarding his wellness and remains appropriate for PHP.      Prognosis:  Positive prognosis for Jose, he as been very engaged, participatory and intentional regarding adapting new coping skills into his toolbox and presents significant progress in symptom and emotion management.  Jose met with  Dr Yates, [facility], and addressed some medication adjustments which was coordinated with his outpatient medication provider-Karena Long; Jose reports positive experience with adjustments and will continue to follow Karena Long for medication management.  Jose also followed through with his medical appointments to address the frostbite treatment and has practiced self care for medical related issues.  Jose plans to return to work on 2/27/23 and has a positive vision of what path he would like for his future.  He does have to continue the groups through [facility] and upon both anticipating returning to the groups and attending the groups; Jose is highly encouraged to review and practice the skills he has learned daily to support him in completing that program.     Jose will continue to be successful as long as he is intentional about attending all outpatient mental health and medical appointments, taking his medications consistently and practicing self care.  He is also encouraged to reach out to his supports as he needs them and to attend the walk in support group at  PHP and/or the  PHP Step Down groups.  Jose will benefit from the [facility] Step Down group program for continued support in managing symptoms via using coping skills and self care. Jose is still vulnerable to decompensate as he has been in the process of medication adjustments and has to complete the outpatient group program through [facility] in [facility]; this is a source of stress for Jose and impacts his symptoms of anxiety and depression, he will definitely benefit from continued support and the Step Down groups through [facility].  Jose has been a wonderful addition to the PHP program and we wish him the best on his journey to wellness.      Personal Safety:     * Follow your safety plan    * Call crisis lines as needed:    PHP program - [phone] - call if you have questions or are struggling    [place] Area:  [facility], Crisis stabilization [facility]- [phone]  [facility] Crisis Line: 3-405-443-8478  [facility]: [phone]  Sexual Assault Program [facility]: [phone] or [phone]  [place] [facility]: [phone] Ext: [phone]       Calls answered Mon-Fri-8:00 am--4:30 pm    [place]:  [facility]: [phone]  [facility] first call for help: [phone]  [facility]:  [phone]    [place] Area:  Warm Line: [phone]       Calls answered Tuesday--Saturday 4:00 pm--10:00 pm  [facility] Crisis Line - [phone]  [place] [facility] Stabil[facility] [phone]    [place] Statewide:  [place] Crisis and Referral Services: 2-458-872-0633  National Suicide Prevention Lifeline: 4-149-890-TALK (1239)   - xym3gfgx- Text  Life  to 02419  First Call for Help: 2-1-1  [facility] Helpline- 6-685-SVIL-HELP      I have learned to manage my symptoms by:  Taking my medications, practicing self care, reaching out to my supports, attending my appointments, using my skills from PHP.    My Supports are:   William-therapist; Veena-[facility]; Karena-medication management; brother; PHP staff    Managing Symptoms and Preventing Relapse    * Go to all of your appointments    * Take all medications as directed.      * Carry a current list if medication with you    * Do not use illicit (street) drugs.  Avoid alcohol    * Report these symptoms to your care team. These are early signs of relapse:   Thoughts of suicide   Losing more sleep   Increased confusion   Mood getting worse   Feeling more aggressive   Other:  Isolating, sleeping too much, negative thoughts    *Use these skills daily:  5:1, Present Moment, Drop the Rope, AAA, Boundaries, THINK, I'm Hardy It, 3C's, Wish you Well, Thank you, Common Humanity, Know my Truth, Just Like Me, TFC-Think, feel, choose; Radical Acceptance, THE, Gratitude, Hello Emotion, Thriving of the Focused    Jose was seen during the program by Dr Mino Yates from [facility] for medications management.    Follow up with medications manager: Karena Long   Next visit: per patient    Follow up with your therapist: William Mendoza, [facility]   Next visit: 2/27/23-Monday @ 8am    Follow with other  supports: Veena from [facility]    Go to group therapy and / or support groups at: [facility] Step Down Group-Thursdays 3-4:15; [facility] Walk in support group Tuesdays 3-4pm    See your medical doctor about:  Continued medical follow up    Copy of summary sent to: [facility]

## 2023-02-24 NOTE — PROGRESS NOTES
"Group Therapy Progress Notes     Client Initial Individualized Goals for Treatment: Will increase effective use of support / increase ability to ask for help. Identify attitudes or beliefs about asking for help that interfere with your ability to ask for help and identify more helpful attitudes, Identify providers you will continue to see for individual therapy, psychiatric care, group therapy, or other specialized providers. and Create a return to work, return to school, or return to daily routines plan.    Area of Treatment Focus:  Community Resources / Support and Discharge Planning    Therapeutic Interventions/Treatment Strategies:  Assist clients in establishing / strengthening support network  Assist with discharge planning  Engage in safety planning when indicated  Facilitate increased self awareness  Provide feedback about social skills  Teach adaptive coping skills and communication skills  Use reality based supportive approach    Response to Treatment Strategies:  Accepted Feedback, Gave Feedback, Listened , Focused on Goals, Attentive and Accepted Support    Name of Groups:  Psychotherapy wrap up group - 2-3    Description and Therapeutic Outcome:   In psychotherapy wrap up group, Jose reviewed his take away from today.  Reviewed validation of self and others as well as application of skills during a skills card game.  Also reviewed aroma therapy and sugar scrubs in OT.  Reviewed how helpful it was to do application today as this is his last day and it helps to reinforce the skills.  Reviewed his take away from the program - realizes he does \"me too\" a lot instead of just listening or I get it - good awareness and can use AAA on this as he is aware.  Big take aways are using Present moment, Drop the rope and acceptance.  Reviewed his follow up services in the community - he feels like he has enough services for step down - see discharge summary for more details.  Jose will complete the program " today.      Is this a Weekly Review of the Progress on the Treatment Plan?  NO    Are Treatment Plan Goals being addressed?  YES      Are Treatment Plan Strategies to Address Goals Effective?  YES      Are there any current contracts in place?  YES

## 2023-02-24 NOTE — PROGRESS NOTES
"Group Therapy Progress Notes     Client Initial Individualized Goals for Treatment:     Will increase effective use of support / increase ability to ask for help. Identify attitudes or beliefs about asking for help that interfere with your ability to ask for help and identify more helpful attitudes, Identify providers you will continue to see for individual therapy, psychiatric care, group therapy, or other specialized providers. and Create a return to work, return to school, or return to daily routines plan.    Area of Treatment Focus:  Community Resources / Support and Discharge Planning    Therapeutic Interventions/Treatment Strategies:  Facilitate increased self awareness  Provide feedback about social skills  Teach adaptive coping skills and communication skills  Use reality based supportive approach    Response to Treatment Strategies:  Accepted Feedback, Gave Feedback, Listened  and Focused on Goals    Name of Groups:  Self Development - Time: 10-10:50;Validation 1-1:50     Description and Therapeutic Outcome:     During Self Development group, Jose presented as focused and participatory. The topic entailed self or other questions. He is transparent in response and easily addresses others with a question. Jose continues to be appropriately social with peers.    During the Validation group, Jose identified wanting to work on being focused and not thinking about his own day when another is talking about their day. He effectively role-played Validation. He Jose will work on being attentive, interested, listen; and no multitasking. He will also be Mindful regarding \"One-upping\" at work as well as being Mindful regarding the \"Me too\" response. Jose continues to present as intentional regarding his wellness, is diligent regarding filing his handouts in a binder, and daily reviewing the work sheets.    Is this a Weekly Review of the Progress on the Treatment Plan?  NO    Are Treatment Plan Goals being " addressed?  YES      Are Treatment Plan Strategies to Address Goals Effective?  YES      Are there any current contracts in place?  YES

## 2023-02-24 NOTE — PROGRESS NOTES
Group Therapy Progress Notes     Client Initial Individualized Goals for Treatment:     Will increase effective use of support / increase ability to ask for help. Identify attitudes or beliefs about asking for help that interfere with your ability to ask for help and identify more helpful attitudes, Identify providers you will continue to see for individual therapy, psychiatric care, group therapy, or other specialized providers. and Create a return to work, return to school, or return to daily routines plan.       Area of Treatment Focus:  Community Resources / Support and Discharge Planning    Therapeutic Interventions/Treatment Strategies:  Assist clients in establishing / strengthening support network  Assist with discharge planning  Engage in safety planning when indicated  Facilitate increased self awareness  Provide feedback about social skills  Teach adaptive coping skills and communication skills  Use reality based supportive approach    Response to Treatment Strategies:  Accepted Feedback, Gave Feedback, Listened , Focused on Goals and Attentive    Name of Groups:  Stress Management - Time: 11:10-12:00    Description and Therapeutic Outcome:   OT Stress Management group:   This group assists clients to learn healthy ways to manage stress when symptoms of their illness decrease ability to function and engage in activities.    Discussed the use of aromatherapy to aid with symptom management/coping skills.  Discussed things to be aware of when buying essential oils and utilizing a company that tests their product. Discussed the benefits of the essential oils in the aromatherapy kit.     Provided opportunity to create a aromastick and sugar scrub with use of the EO.  Jose created sugar scrub and an aromastick for home use.     Is this a Weekly Review of the Progress on the Treatment Plan?  NO    Are Treatment Plan Goals being addressed?  YES      Are Treatment Plan Strategies to Address Goals  Effective?  YES      Are there any current contracts in place?  YES

## 2023-03-06 ENCOUNTER — MYC REFILL (OUTPATIENT)
Dept: FAMILY MEDICINE | Facility: OTHER | Age: 28
End: 2023-03-06

## 2023-03-06 DIAGNOSIS — F33.2 MAJOR DEPRESSIVE DISORDER, RECURRENT SEVERE WITHOUT PSYCHOTIC FEATURES (H): ICD-10-CM

## 2023-03-06 DIAGNOSIS — F41.1 GENERALIZED ANXIETY DISORDER: ICD-10-CM

## 2023-03-06 RX ORDER — GABAPENTIN 800 MG/1
800 TABLET ORAL 3 TIMES DAILY
Qty: 90 TABLET | Refills: 0 | Status: CANCELLED | OUTPATIENT
Start: 2023-03-06

## 2023-03-06 RX ORDER — DULOXETIN HYDROCHLORIDE 30 MG/1
30 CAPSULE, DELAYED RELEASE ORAL 2 TIMES DAILY
Qty: 60 CAPSULE | Refills: 0 | Status: CANCELLED | OUTPATIENT
Start: 2023-03-06

## 2023-03-06 NOTE — TELEPHONE ENCOUNTER
Disp Refills Start End OZZY   gabapentin (NEURONTIN) 800 MG tablet 90 tablet 0 2/7/2023  No      Disp Refills Start End OZZY   DULoxetine (CYMBALTA) 30 MG capsule 60 capsule 0 2/7/2023  No       Last Office Visit:   2/24/2023  [facility] [place]    Future Office visit:

## 2023-03-13 ENCOUNTER — TELEPHONE (OUTPATIENT)
Dept: FAMILY MEDICINE | Facility: OTHER | Age: 28
End: 2023-03-13

## 2023-03-13 NOTE — TELEPHONE ENCOUNTER
I spoke with the patient at 1:54pm, I asked him if he needed any medication refills from Dr. Willard at this time and he said no.  Our conversation ended no follow -up.

## 2023-03-22 ENCOUNTER — TELEPHONE (OUTPATIENT)
Dept: PULMONOLOGY | Facility: OTHER | Age: 28
End: 2023-03-22

## 2023-04-05 DIAGNOSIS — E66.01 MORBID OBESITY (H): Primary | ICD-10-CM

## 2023-04-05 DIAGNOSIS — R53.83 FATIGUE: ICD-10-CM

## 2023-04-05 DIAGNOSIS — R29.818 TRANSIENT PARALYSIS OF LIMB: ICD-10-CM

## 2023-04-29 ENCOUNTER — HEALTH MAINTENANCE LETTER (OUTPATIENT)
Age: 28
End: 2023-04-29

## 2023-05-23 NOTE — PROGRESS NOTES
Group Therapy Progress Notes     Client Initial Individualized Goals for Treatment:     Client will notify staff when needing assistance to develop or implement a coping plan to manage suicidal or self injurious urges.  Client will learn and practice 1 - 2 coping skills to help improve depressive symptoms    Area of Treatment Focus:  Personal Safety    Therapeutic Interventions/Treatment Strategies:  Assist clients in establishing / strengthening support network  Assist to identify treatment goals  Assist with discharge planning  Assess / reassess for appropriate therapy program involvement, encourage participation in therapies  Engage in safety planning when indicated  Teach adaptive coping skills and communication skills  Use reality based supportive approach    Response to Treatment Strategies:  Accepted Feedback, Gave Feedback, Listened  and Focused on Goals    Name of Groups:  Psychotherapy group 9-10am    Description and Therapeutic Outcome:     During psychotherapy  group, Jose is engaged and talkative. He shared about his evening and states that he went to home after group yesterday and used Present Moment and gratitude about being grateful that he has a supportive family and appreciating that they love him. Some of the strengths he identified were: happy; loving; good boss; boundaries; hopeful; hardworking; caring, positive thinking; curious, reader. He accepted positives from peers and also offered supportive feedback to his peers.  Jose continues to be appropriate for PHP.    Is this a Weekly Review of the Progress on the Treatment Plan?  NO    Are Treatment Plan Goals being addressed?  YES      Are Treatment Plan Strategies to Address Goals Effective?  YES      Are there any current contracts in place?  YES

## 2023-05-23 NOTE — PROGRESS NOTES
"Group Therapy Progress Notes     Client Initial Individualized Goals for Treatment:     Will Improve Mindfulness / Stay in the Here and Now Intentionally bringing your attention to something beautiful, pleasant, or interesting that is occurring or is present in your immediate environment or experience on a regular basis.    Area of Treatment Focus:  Symptom Stabilization and Management    Therapeutic Interventions/Treatment Strategies:  Assist clients in establishing / strengthening support network  Assist with discharge planning  Assess / reassess for appropriate therapy program involvement, encourage participation in therapies  Engage in safety planning when indicated  Facilitate increased self awareness  Provide feedback about social skills  Teach adaptive coping skills and communication skills  Use reality based supportive approach    Response to Treatment Strategies:  Accepted Feedback, Gave Feedback, Listened  and Focused on Goals    Name of Groups:  Psychotherapy wrap up group 2-3    Description and Therapeutic Outcome:   In psychotherapy wrap up group, Jose shares that he learned about Radical Acceptance today. He identified having \"resentment\" toward his dad because he was the result of his dad's affair. \"I do accept the affair as I would otherwise not be here.\" He acknowledges Letting Go will take time. He will also implement the 3 C's; AAA- Awareness, Acceptance, action; Opposite Action/Opposite Emotion.   During the follow up group, Jose provided positive feedback to an emotional peer. He also engaged in the group process, though found the topic challenging. He will work on \"I AM.. and be mindful of what words follow his I Am \"as that is what I am going to be\". He will also focus on Know My Truth; Gratitude; accept life in general. Jose continues to present as intentional regarding his wellness and remains appropriate for PHP.    Is this a Weekly Review of the Progress on the Treatment " Plan?  YES    Are Treatment Plan Goals being addressed?  YES      Are Treatment Plan Strategies to Address Goals Effective?  YES      Are there any current contracts in place?  YES

## 2023-05-23 NOTE — PROGRESS NOTES
Group Therapy Progress Notes     Client Initial Individualized Goals for Treatment:     Will Improve Mindfulness / Stay in the Here and Now Intentionally bringing your attention to something beautiful, pleasant, or interesting that is occurring or is present in your immediate environment or experience on a regular basis.    Area of Treatment Focus:  Symptom Stabilization and Management    Therapeutic Interventions/Treatment Strategies:  Assist clients in establishing / strengthening support network  Assist with discharge planning  Assess / reassess for appropriate therapy program involvement, encourage participation in therapies  Engage in safety planning when indicated  Facilitate increased self awareness  Provide feedback about social skills  Teach adaptive coping skills and communication skills  Use reality based supportive approach    Response to Treatment Strategies:  Accepted Feedback, Gave Feedback, Listened  and Focused on Goals    Name of Groups:   Self Development 1-1:50    Description and Therapeutic Outcome:     During self development  group, Jose presented as bright and engaged. He continues to provide positive feedback to the group process and is appropriately social with peers. Jose has a positive grasp of the skills and is able to elaborate on them with clarity. He continues to work on Present Moment, Time Limit and the 5 to 1 for Mindfulness.Jose was able to illustrate skills that are significant to him: Boundaries; T.H.I.N.K.; Focused Mode; Thriving of the Focused; the Thisness, not Thatness concept. Awareness. Jose continues to present as intentional regarding his wellness and remains appropriate for PHP.    Is this a Weekly Review of the Progress on the Treatment Plan?    NO    Are Treatment Plan Goals being addressed?  YES      Are Treatment Plan Strategies to Address Goals Effective?  YES      Are there any current contracts in place?  YES

## 2023-05-23 NOTE — PROGRESS NOTES
"Group Therapy Progress Notes     Client Initial Individualized Goals for Treatment:     Client will notify staff when needing assistance to develop or implement a coping plan to manage suicidal or self injurious urges.  Client will learn and practice 1 - 2 coping skills to help improve depressive symptoms.    Area of Treatment Focus:  Personal Safety    Therapeutic Interventions/Treatment Strategies:  Assist clients in establishing / strengthening support network  Assist to identify treatment goals  Assist with discharge planning  Assess / reassess for appropriate therapy program involvement, encourage participation in therapies  Engage in safety planning when indicated  Teach adaptive coping skills and communication skills  Use reality based supportive approach    Response to Treatment Strategies:  Accepted Feedback, Gave Feedback, Listened  and Focused on Goals    Name of Groups:  Psychotherapy wrap up group    Description and Therapeutic Outcome:   In psychotherapy wrap up group,  Jose presented as alert, participatory and provided positive feedback to the group process, as well as to a graduating peer. He continues to work on Awareness and changing his \"I'm sorry to Thank you\". He grasps the concept of the 3 Cycles - beginning with an event and the outcome being a trauma due to not interrupting the process.  During the Self Awareness group, Jose continued to present as positive and engaged. He valued the concept of \"The trauma was real then, but now, it is only an illusion, carried through time via a train of thought\". Jose is also intent on rewiring negatives with positives: worthless - I am loved; ugly - I have great features. He will also practice: the THE skill; Lower Expectations; Mindfulness; Awareness. Jose presents as intentional regarding his wellness and remains appropriate for PHP.    Is this a Weekly Review of the Progress on the Treatment Plan?  NO    Are Treatment Plan Goals being " addressed?  YES      Are Treatment Plan Strategies to Address Goals Effective?  YES      Are there any current contracts in place?  YES

## 2023-05-23 NOTE — PROGRESS NOTES
Group Therapy Progress Notes     Client Initial Individualized Goals for Treatment:     Client will notify staff when needing assistance to develop or implement a coping plan to manage suicidal or self injurious urges.  Client will learn and practice 1 - 2 coping skills to help improve depressive symptoms    Area of Treatment Focus:  Personal Safety    Therapeutic Interventions/Treatment Strategies:  Assist clients in establishing / strengthening support network  Assist to identify treatment goals  Assist with discharge planning  Assess / reassess for appropriate therapy program involvement, encourage participation in therapies  Engage in safety planning when indicated  Teach adaptive coping skills and communication skills  Use reality based supportive approach    Response to Treatment Strategies:  Accepted Feedback, Gave Feedback, Listened  and Focused on Goals    Name of Groups:  Psychotherapy group 9-10am    Description and Therapeutic Outcome:     During psychotherapy  group, Jose presented as social, interested, and easily participatory. He shared about his evening and states that he went to his grandparents after group yesterday and used Present Moment and gratitude about being grateful that he can still visit with his grandparents and appreciating that they love him. Some of the strengths he identified were: spiritual; happy; loving; good boss; boundaries; hopeful; hardworking; caring, positive thinking; curious, reader. He accepted positives from peers and also offered supportive feedback to his peers.  Jose continues to be appropriate for PHP.    Is this a Weekly Review of the Progress on the Treatment Plan?  NO    Are Treatment Plan Goals being addressed?  YES      Are Treatment Plan Strategies to Address Goals Effective?  YES      Are there any current contracts in place?  YES

## 2023-05-23 NOTE — PROGRESS NOTES
"Group Therapy Progress Notes     Client Initial Individualized Goals for Treatment:     Client will notify staff when needing assistance to develop or implement a coping plan to manage suicidal or self injurious urges.  Client will learn and practice 1 - 2 coping skills to help improve depressive symptoms    Area of Treatment Focus:  Personal Safety    Therapeutic Interventions/Treatment Strategies:  Assist clients in establishing / strengthening support network  Assist to identify treatment goals  Assist with discharge planning  Assess / reassess for appropriate therapy program involvement, encourage participation in therapies  Engage in safety planning when indicated  Teach adaptive coping skills and communication skills  Use reality based supportive approach    Response to Treatment Strategies:  Accepted Feedback, Gave Feedback, Listened  and Focused on Goals    Name of Groups:  Psychotherapy wrap up group 2-3pm    Description and Therapeutic Outcome:   In psychotherapy wrap up group, Jose reviews his take away from the day and states that he like the groups on Time and perfectionism and focused specifically on 'measuring up' to others. This especially related to working on measuring up to his intelligent brother \"but my body paid the price\" due to excessive sports. He also accepts Shame being the thief of intimacy. Jose has a positive grasp on the Shame concept and will focus on : Drop the Rope; Letting Go; 3 C's; Present Moment; the THE skill; Know My Truth.  During Perfectionism group, Jose identified having Low Self Esteem. He is also aware that \"Pain comes from the difference between what is happening and what I think ought to be happening\". Jose will implement Self Compassion and identified that he will focus on the tools he now has with the skills; use his supports : Step mom and best friend; acceptance; self compassion. Jose continues to present as intentional regarding his wellness and " remains appropriate for PHP.    Is this a Weekly Review of the Progress on the Treatment Plan?  NO    Are Treatment Plan Goals being addressed?  YES      Are Treatment Plan Strategies to Address Goals Effective?  YES      Are there any current contracts in place?  YES

## 2023-05-23 NOTE — PROGRESS NOTES
Group Therapy Progress Notes     Client Initial Individualized Goals for Treatment: Client will notify staff when needing assistance to develop or implement a coping plan to manage suicidal or self injurious urges.  Client will learn and practice 1 - 2 coping skills to help improve depressive symptoms    Area of Treatment Focus:  Personal Safety    Therapeutic Interventions/Treatment Strategies:  Assist clients in establishing / strengthening support network  Assist to identify treatment goals  Assess / reassess level of potential for harm to self or others  Engage in safety planning when indicated  Facilitate increased self awareness  Teach adaptive coping skills and communication skills  Use reality based supportive approach    Response to Treatment Strategies:  Accepted Feedback, Gave Feedback, Listened , Focused on Goals, Attentive and Accepted Support    Name of Groups: Psychotherapy group 9-10am    Description and Therapeutic Outcome:   In psychotherapy group, Jose presented as engaged, gave good group feedback, and was intentional regarding his wellness.  He advised the discussion on shame and guilt hit hard; he stated he has always struggled with a lot of perfectionism and shame regarding not being good enough. Jose is very open and talkative this morning. He remains appropriate for PHP.       Is this a Weekly Review of the Progress on the Treatment Plan?  NO    Are Treatment Plan Goals being addressed?  YES      Are Treatment Plan Strategies to Address Goals Effective?  YES      Are there any current contracts in place?  YES

## 2023-05-23 NOTE — PROGRESS NOTES
"Group Therapy Progress Notes     Client Initial Individualized Goals for Treatment:      Will increase effective use of support / increase ability to ask for help. Identify attitudes or beliefs about asking for help that interfere with your ability to ask for help and identify more helpful attitudes, Identify providers you will continue to see for individual therapy, psychiatric care, group therapy, or other specialized providers. and Create a return to work, return to school, or return to daily routines plan.     Area of Treatment Focus:  Community Resources / Support and Discharge Planning     Therapeutic Interventions/Treatment Strategies:  Assist clients in establishing / strengthening support network  Assist with discharge planning  Engage in safety planning when indicated  Facilitate increased self awareness  Provide feedback about social skills  Teach adaptive coping skills and communication skills  Use reality based supportive approach     Response to Treatment Strategies:  Accepted Feedback, Gave Feedback, Listened  and Focused on Goals     Name of Groups:  Psychotherapy group 9-10am     Description and Therapeutic Outcome:   In psychotherapy group, Fadi engaged very well and spontaneously reviewed his eveimimng as well as giving nice feedback and support to his peers.  Discussed his insight into having rigid boundaries and awareness - will use 3C's, present moment and AAA as well as THINK and Just do it related to his boundaries.  Reports focus is on keeping the past in the past and has adapted one of the Now I am skills to \"In this moment\" to practice present moment.  Related to purpose, he is looking into the option of being a  - feels his purpose is related to MH.  Discussed taking steps and completing things one step at a time so he has goals and not having this just be a concept that he would like to do.  He remains appropriate for PHP.      Is this a Weekly Review of the " Progress on the Treatment Plan?  NO     Are Treatment Plan Goals being addressed?  YES        Are Treatment Plan Strategies to Address Goals Effective?  YES        Are there any current contracts in place?  YES

## 2023-05-23 NOTE — PROGRESS NOTES
"Group Therapy Progress Notes     Client Initial Individualized Goals for Treatment:     Will Improve Mindfulness / Stay in the Here and Now Intentionally bringing your attention to something beautiful, pleasant, or interesting that is occurring or is present in your immediate environment or experience on a regular basis.    Area of Treatment Focus:  Symptom Stabilization and Management    Therapeutic Interventions/Treatment Strategies:  Assist clients in establishing / strengthening support network  Assist with discharge planning  Assess / reassess for appropriate therapy program involvement, encourage participation in therapies  Engage in safety planning when indicated  Facilitate increased self awareness  Provide feedback about social skills  Teach adaptive coping skills and communication skills  Use reality based supportive approach    Response to Treatment Strategies:  Accepted Feedback, Gave Feedback, Listened  and Focused on Goals    Name of Groups:  Psychotherapy wrap up group 2-3    Description and Therapeutic Outcome:     In psychotherapy wrap up group,  Jose presented as bright and smiling - he engages well with staff and peers.  We reviewed the concept of grounding and reviewed \"the big list of grounding techniques\".  Jose participated well in the group, volunteering for some reading as well as giving examples of how he uses some of the grounding techniques.  He was challenged to pick a couple of the skills to practice over the weekend and report to the group in the wrap up group which techniques he was going to practice. He remains appropropriatre for PHP.        Is this a Weekly Review of the Progress on the Treatment Plan?    NO    Are Treatment Plan Goals being addressed?  YES      Are Treatment Plan Strategies to Address Goals Effective?  YES      Are there any current contracts in place?  YES           "

## 2023-05-23 NOTE — PROGRESS NOTES
"Group Therapy Progress Notes     Client Initial Individualized Goals for Treatment:     Will increase effective use of support / increase ability to ask for help. Identify attitudes or beliefs about asking for help that interfere with your ability to ask for help and identify more helpful attitudes, Identify providers you will continue to see for individual therapy, psychiatric care, group therapy, or other specialized providers. and Create a return to work, return to school, or return to daily routines plan.    Area of Treatment Focus:  Community Resources / Support and Discharge Planning    Therapeutic Interventions/Treatment Strategies:    Assist clients in establishing / strengthening support network  Assist with discharge planning  Engage in safety planning when indicated  Facilitate increased self awareness  Provide feedback about social skills  Teach adaptive coping skills and communication skills  Use reality based supportive approach    Response to Treatment Strategies:  Accepted Feedback, Gave Feedback, Listened  and Focused on Goals    Name of Groups:  Psychotherapy wrap up group 2-3    Description and Therapeutic Outcome:   In psychotherapy wrap up group, Jose shares that he worked on coping skills with a focus on the \"No-No's\", Jose presented as focused, social, and participatory, with insightful feedback. He grasped the concept of No 'Why'. He understands the context in which it is being applied: 'What' happened, rather than 'Why' as why's can trigger densiveness and shaming. During the Listening Skills group, Jose volunteered to participate in a role play. The exercise entailed focusing on reflecting and not giving advice. He was proficient in practicing this and is also proficient in skill use: Gratitude; Present Moment; AAA; Know Your Truth; 5 to 1; 3 Good Things. Jose continues to present as intentional regarding his wellness and remains appropriate for PHP.    Is this a Weekly Review " of the Progress on the Treatment Plan?  YES    Are Treatment Plan Goals being addressed?  YES      Are Treatment Plan Strategies to Address Goals Effective?  YES      Are there any current contracts in place?  YES

## 2023-05-23 NOTE — PROGRESS NOTES
"Group Therapy Progress Notes     Client Initial Individualized Goals for Treatment:     Will Improve Mindfulness / Stay in the Here and Now Intentionally bringing your attention to something beautiful, pleasant, or interesting that is occurring or is present in your immediate environment or experience on a regular basis.    Area of Treatment Focus:  Symptom Stabilization and Management    Therapeutic Interventions/Treatment Strategies:  Assist clients in establishing / strengthening support network  Assist with discharge planning  Assess / reassess for appropriate therapy program involvement, encourage participation in therapies  Engage in safety planning when indicated  Facilitate increased self awareness  Provide feedback about social skills  Teach adaptive coping skills and communication skills  Use reality based supportive approach    Response to Treatment Strategies:  Accepted Feedback, Gave Feedback, Listened  and Focused on Goals    Name of Groups:  Psychotherapy group 9-10am    Description and Therapeutic Outcome:   In psychotherapy group, Jose shares that he has been practicing skills and using the \"the\" skill, AAA, Drop the Rope, and present moment. In program today, reviewed TFC, Hello Emotion and the 5:3:2 skills as well as the WRAP packet.  He also mentioned a new skill of \"check your soil\" with the idea of making sure you have the ability to group - add or subtract things that are toxic.  He reports learning that feelings are just feelings - not good or bad and not judging what feelings come up.  Jose engaged very well - gave helpful and supportive feedback to his peers. He continues to be appropriate for PHP.      Is this a Weekly Review of the Progress on the Treatment Plan?  NO    Are Treatment Plan Goals being addressed?  YES      Are Treatment Plan Strategies to Address Goals Effective?  YES      Are there any current contracts in place?  YES           "

## 2023-05-24 NOTE — PROGRESS NOTES
"Group Therapy Progress Notes     Client Initial Individualized Goals for Treatment: Will increase effective use of support / increase ability to ask for help. Identify attitudes or beliefs about asking for help that interfere with your ability to ask for help and identify more helpful attitudes, Identify providers you will continue to see for individual therapy, psychiatric care, group therapy, or other specialized providers. and Create a return to work, return to school, or return to daily routines plan.    Area of Treatment Focus:  Community Resources / Support and Discharge Planning    Therapeutic Interventions/Treatment Strategies:  Assist clients in establishing / strengthening support network  Assist with discharge planning  Engage in safety planning when indicated  Facilitate increased self awareness  Provide feedback about social skills  Teach adaptive coping skills and communication skills  Use reality based supportive approach    Response to Treatment Strategies:  Accepted Feedback, Gave Feedback, Listened , Focused on Goals, Attentive and Accepted Support    Name of Groups:  Psychotherapy group 9-10am    Description and Therapeutic Outcome:   In psychotherapy group, Jose reviewed his evening.  He engaged well and reports he likes adding more techniques to help him stay in the present moment.  Reports using skills that help him to slow down, minimize emails and texts, deep breathing really helps and learning to say no.  He also has been practicing \"in this moment\" and 5:1 - really likes these skills and is trying to practice them when he is feeling good.  Good discussion about One thing at a time as he struggles with this skill. He remains appropriate for PHP.       Is this a Weekly Review of the Progress on the Treatment Plan?  NO    Are Treatment Plan Goals being addressed?  YES      Are Treatment Plan Strategies to Address Goals Effective?  YES      Are there any current contracts in " place?  YES

## 2023-05-24 NOTE — PROGRESS NOTES
"Group Therapy Progress Notes     Client Initial Individualized Goals for Treatment:     Will increase effective use of support / increase ability to ask for help. Identify attitudes or beliefs about asking for help that interfere with your ability to ask for help and identify more helpful attitudes, Identify providers you will continue to see for individual therapy, psychiatric care, group therapy, or other specialized providers. and Create a return to work, return to school, or return to daily routines plan.    Area of Treatment Focus:  Community Resources / Support and Discharge Planning    Therapeutic Interventions/Treatment Strategies:  Facilitate increased self awareness  Provide feedback about social skills  Teach adaptive coping skills and communication skills  Use reality based supportive approach    Response to Treatment Strategies:  Accepted Feedback, Gave Feedback, Listened  and Focused on Goals    Name of Groups:  Psychotherapy wrap up group 2-3    Description and Therapeutic Outcome:   In psychotherapy wrap up group,  Jose presented as initially responsive, though became aloof when the word \"beliefs\" was employed. After group, he identified that he did not want to engage in a \"Oriental orthodox\" discussion. Brought awareness to Jose that 'suicide being selfish or not selfish and thinking from upbringing and childhood' were the only beliefs discussed.  During the Goals group, Joes presented as relaxed and participatory. He identified things he wants to:    START doing    : Mindfulness nature walks; eat more calcium; He will apply 'Just Do It'.  KEEP doing      : Dieting;Reading. He will apply Thriving of the Focused; Me Time; Self Care.  STOP doing       : Thinking of the Past; Saying Sorry; Negative Self Talk.  He will apply Drop the Rope; Awareness; Be Intentional; I am [place] It.  Jose continues to present as intentional regarding his wellness and remains appropriate for PHP.    Is this a Weekly " Review of the Progress on the Treatment Plan?  NO    Are Treatment Plan Goals being addressed?  YES      Are Treatment Plan Strategies to Address Goals Effective?  YES      Are there any current contracts in place?  YES

## 2023-05-24 NOTE — PROGRESS NOTES
Group Therapy Progress Notes     Client Initial Individualized Goals for Treatment:     Will increase effective use of support / increase ability to ask for help. Identify attitudes or beliefs about asking for help that interfere with your ability to ask for help and identify more helpful attitudes, Identify providers you will continue to see for individual therapy, psychiatric care, group therapy, or other specialized providers. and Create a return to work, return to school, or return to daily routines plan.    Area of Treatment Focus:  Community Resources / Support and Discharge Planning    Therapeutic Interventions/Treatment Strategies:  Assist with discharge planning  Facilitate increased self awareness  Provide feedback about social skills  Teach adaptive coping skills and communication skills  Use reality based supportive approach    Response to Treatment Strategies:  Accepted Feedback, Gave Feedback, Listened  and Focused on Goals    Name of Groups:  Psychotherapy wrap up group 2-3    Description and Therapeutic Outcome:   In psychotherapy wrap up group, Jose states that the group topic today was about anxiety.. He identified his Anxiety symptoms as: Craving for food. He also referred to being frustrated at the ER yesterday and had the urge to Default to food. He did not, and continues to implement Awareness; 5 to 1; Gratitude; the GAP and Present Moment. Other symptoms: Headaches; Light Headedness; Palpitations; Inadequacy; Guilt; Lying;  Hypochondrias. During the follow up group, Jose identified skills that he will implement to cope with Anxiety: Learn Acceptance; Focus on the future; build positive relationships; avoid  Isolation; ask for help when I need it. Jose continues to present as intentional regarding his wellness and remains appropriate for PHP.    Is this a Weekly Review of the Progress on the Treatment Plan?  NO    Are Treatment Plan Goals being addressed?  YES      Are Treatment Plan  Strategies to Address Goals Effective?  YES      Are there any current contracts in place?  YES

## 2023-05-24 NOTE — PROGRESS NOTES
"Group Therapy Progress Notes     Client Initial Individualized Goals for Treatment: Will increase effective use of support / increase ability to ask for help. Identify attitudes or beliefs about asking for help that interfere with your ability to ask for help and identify more helpful attitudes, Identify providers you will continue to see for individual therapy, psychiatric care, group therapy, or other specialized providers. and Create a return to work, return to school, or return to daily routines plan.    Area of Treatment Focus:  Community Resources / Support and Discharge Planning    Therapeutic Interventions/Treatment Strategies:  Assist clients in establishing / strengthening support network  Assist with discharge planning  Engage in safety planning when indicated  Facilitate increased self awareness  Provide feedback about social skills  Teach adaptive coping skills and communication skills  Use reality based supportive approach    Response to Treatment Strategies:  Accepted Feedback, Gave Feedback, Listened , Focused on Goals, Attentive and Accepted Support    Name of Groups:  Psychotherapy group 9-10am    Description and Therapeutic Outcome:   In psychotherapy group, Jose reviewed his evening.  He engaged well and reports he likes adding more techniques to help him stay in the present moment.  Reports using skills that help him to slow down, minimize emails and texts, deep breathing really helps and learning to say no.  He also has been practicing \"in this moment\" and 5:1 - really likes these skills and is trying to practice them when he is feeling good.  Good discussion about One thing at a time as he struggles with this skill.  He continues to engage spontaneously - he has shifted at times into discussion about the other group members and possible deflecting talking about himself - he does accept gentle redirection but does need this redirection a few times during the group. He remains appropriate for " PHP.       Is this a Weekly Review of the Progress on the Treatment Plan?  NO    Are Treatment Plan Goals being addressed?  YES      Are Treatment Plan Strategies to Address Goals Effective?  YES      Are there any current contracts in place?  YES

## 2023-07-06 ENCOUNTER — PSYCHE (OUTPATIENT)
Dept: PSYCHOLOGY | Facility: OTHER | Age: 28
End: 2023-07-06
Attending: SOCIAL WORKER
Payer: COMMERCIAL

## 2023-07-06 DIAGNOSIS — F33.2 SEVERE RECURRENT MAJOR DEPRESSION WITHOUT PSYCHOTIC FEATURES (H): Primary | ICD-10-CM

## 2023-07-06 PROCEDURE — 90853 GROUP PSYCHOTHERAPY: CPT

## 2023-07-07 NOTE — CONFIDENTIAL NOTE
Individualized Treatment Plan  Step-down/Wellness group           Date of Plan: 2023    Name: Jose Alexander MRN: [id number]    : 1995    Programs:  [facility]    Diagnosis: 296.33 (F33.2) Major Depressive Disorder, Recurrent Episode, Severe _ and With anxious distress;  Generalized Anxiety Disorder    DA Date: 23     Client Strengths:  caring, creative, educated, empathetic, employed, goal-focused, good listener, has a previous history of therapy, insightful, intelligent, motivated, open to learning, open to suggestions / feedback, support of family, friends and providers, supportive, wants to learn, willing to ask questions, willing to relate to others and work history     Long-Term Goals:  Knowledge about illness and management of symptoms   Maintenance of personal safety      Discharge Criteria:  Satisfactory progress toward treatment goals   Improvement re: identified problems and symptoms   Ability to continue recovery at next level of service   Has a discharge plan in place   Regular attendance as scheduled        Area of Treatment Focus:  Progress   Personal Safety, Symptom Stabilization and Management and Community Resources / Support and Discharge Planning       Team Members Contributing to Plan:  Nelida Thomas, [facility]  Cristina Francis, [facility]  Yani Cervantes, [facility]    Client Strengths:  caring, creative, educated, empathetic, employed, goal-focused, good listener, has a previous history of therapy, insightful, intelligent, motivated, open to learning, open to suggestions / feedback, support of family, friends and providers, supportive, wants to learn, willing to ask questions, willing to relate to others and work history    Client Participation in Plan:  Agrees with plan   Received copy of treatment plan     Areas of Vulnerability:  Poor impulse control   Anxiety  Depressive symptoms   Physical/medical: feet     Long-Term Goals:  Knowledge about illness and  management of symptoms   Maintenance of personal safety   Effective management of impulsivity     Abuse Prevention Plan:  Safe, therapeutic environment   Safety coping plan as needed   Education regarding illness and skill development   Coordination with care providers   Impluse control education and intervention     Discharge Criteria:  Satisfactory progress toward treatment goals   Improvement re: identified problems and symptoms   Ability to continue recovery at next level of service   Has a discharge plan in place   Has safety/coping plan in place   Regular attendance as scheduled           Areas of Treatment Focus            Area of Treatment Focus:   Symptom Stabilization and Management  Start Date:    7/6/23    Goal:  Target Date: 9/7/23 Status: Active  Will improve Lifestyle Balance / Reduce overwork, being over responsible, or decreased self care.  Increase attention to and priority of  own needs, preferences, and values by scheduling time to do something you want to do for yourself.       Progress:                        Treatment Strategies:   Assist clients in establishing / strengthening support network  Engage in safety planning when indicated  Facilitate increased self awareness  Teach adaptive coping skills and communication skills    These services will include group therapy and OT group therapy daily and individual therapy and medications management as needed.                 Area of Treatment Focus:   Symptom Stabilization and Management  Start Date:    7/6/23    Goal:  Target Date: 9/7/23 Status: Active    Will increase use of adaptive life skills by working with the group on a variety of life areas.  Will use the group time to increase awareness and skills necessary continue on a path of wellness.      Progress:               Treatment Strategies:   Assist clients in establishing / strengthening support network  Engage in safety planning when indicated  Facilitate increased self awareness  Teach  adaptive coping skills and communication skills    These services will include group therapy and OT group therapy daily and individual therapy and medications management as needed.

## 2023-07-07 NOTE — CONFIDENTIAL NOTE
"[facility]  TRACK: [facility]ful Wellness group    PATIENT'S NAME: Jose Alexander  MRN:   [id number]  :   1995  ACCT. NUMBER: [id number]  DATE OF SERVICE: 23  START TIME: 3:00  END TIME: 4:30  Service modality:  In-person    Diagnoses:  296.33 (F33.2) Major Depressive Disorder, Recurrent Episode, Severe _ and With anxious distress;  Generalized Anxiety Disorder      Data:  Session content: At the start of this group, patients were invited to check in by identifying themselves, describing their current emotional status, and identifying issues to address in this group.   Area(s) of treatment focus addressed in this session included Develop Socialization / Interpersonal Relationship Skills.  We reviewed the week as well as skills that were used and skills that could have been used.   After check in, we reviewed skills that were used this week as well as skills that could have been used this past week.     Had the group ask each other Ice Breaker questions.  Moved on to Self Care Wheel - highlighting Physical, Psychological, Emotional, Spiritual, Personal and Professional areas, with the concept that when these areas are lacking, the person is not in balance.  Handed out blank Wheels and had the group share areas they are practicing positive self care.     Therapeutic Interventions/Treatment Strategies:  Psychotherapist offered support, feedback and validation and reinforced use of skills. Treatment modalities used include Cognitive Behavioral Therapy. Interventions include Coping Skills: Assisted patient in identifying 1-2 healthy distraction skills to reduce overall distress, Discussed how the use of intentional \"in the moment\" actions can help reduce distress and Facilitated understanding of  what factors may contribute to symptom relapse and skills plan to manage symptom relapse  and Relationship Skills: Encouraged development and maintenance  of healthy boundaries.     Assessment:     Patient " response:   Patient responded to session by accepting feedback, giving feedback, listening, being attentive, accepting support and appearing alert.  Jose reported some issues in his family related to uncle having a heart attack and brother going to assisted - was able to keep good boundaries with both issues.  Also reviewed an issue with peers that he was able to disconnect from and remain healthy with his boundaries.  Has been working on Boundaries, 3C's, Present Moment, Awareness and TFC.    Jose engaged very well with the ice breaker questions and was willing to answer questions and ask questions.  He used good humor and presented as comfortable.  When we moved to the Self Care Wheel, Jose was quickly able to identify areas he has been doing well in for self care. For Physical Self care, he identified working out, drinking water, going to the chiro and taking me time.  For Psychological self care, he identified asking for help, getting out in the sun, reading self help book and taking vitamin D.  For Emotional self care, he identified self compassion, pets, hobbies - dancing, music and reading as well as self love.  For spiritual self care, he identified meditation, volunteering, going to therapy, and researching other forms of spirituality.  We did not get to the other two areas but suggested they review these at home.  Good group for Jose as he engaged very well with his peers.     Possible barriers to participation / learning include: and no barriers identified    Health Issues:   None reported       Substance Use Review:   Substance Use: No active concerns identified.    Mental Status/Behavioral Observations  Appearance:   Appropriate   Eye Contact:   Good   Psychomotor Behavior: Normal   Attitude:   Cooperative   Orientation:   All  Speech   Rate / Production: Normal    Volume:  Normal   Mood:    Normal  Affect:    Appropriate   Thought Content:   Clear  Thought Form:  Coherent  Logical      Insight:    Good     Plan:     Safety Plan: No current safety concerns identified.  Recommended that patient call 911 or go to the local ED should there be a change in any of these risk factors.     Barriers to treatment: None identified    Patient Contracts (see media tab):  None    Substance Use: Not addressed in session     Continue or Discharge: Patient will continue in [facility]ness group as planned. Patient is likely to benefit from learning and using skills as they work toward the goals identified in their treatment plan.      Jolanta Thomas, [facility]  July 7, 2023

## 2023-07-13 ENCOUNTER — BEH TREATMENT PLAN (OUTPATIENT)
Dept: PSYCHOLOGY | Facility: OTHER | Age: 28
End: 2023-07-13

## 2023-07-13 ENCOUNTER — PSYCHE (OUTPATIENT)
Dept: PSYCHOLOGY | Facility: OTHER | Age: 28
End: 2023-07-13
Attending: SOCIAL WORKER
Payer: COMMERCIAL

## 2023-07-13 DIAGNOSIS — F33.2 SEVERE RECURRENT MAJOR DEPRESSION WITHOUT PSYCHOTIC FEATURES (H): Primary | ICD-10-CM

## 2023-07-13 PROCEDURE — 90853 GROUP PSYCHOTHERAPY: CPT

## 2023-07-13 NOTE — PROGRESS NOTES
Acknowledgement of Current Treatment Plan       I have reviewed my treatment plan with my therapist / counselor on ______________. I agree with the plan as it is written in the electronic health record.    Patient Signature      Date    ____________________________________  _______________      Therapist Signature      Date    _____________________________________  ________________

## 2023-07-14 NOTE — CONFIDENTIAL NOTE
[facility]  TRACK: Insightful Wellness group    PATIENT'S NAME: Jose Alexander  MRN:   [id number]  :   1995  ACCT. NUMBER: [id number]  DATE OF SERVICE: 23  START TIME: 3:00  END TIME: 4:30  Service modality:  In-person    Diagnoses:  296.33 (F33.2) Major Depressive Disorder, Recurrent Episode, Severe _ and With anxious distress;  Generalized Anxiety Disorder      Data:  Session content: At the start of this group, patients were invited to check in by identifying themselves, describing their current emotional status, and identifying issues to address in this group.   Area(s) of treatment focus addressed in this session included Symptom Management and Personal Safety.  We reviewed the week as well as skills that were used and skills that could have been used.   After check in, we reviewed skills that were used this week as well as skills that could have been used this past week.  We then moved on to Mindfulness - the topic for the month.  Started with What is Mindfulness, Core Mindfulness - what skills and how skills.  We then moved on to Mindfulness Exercises and practiced Mindful eating chocolate and Palms up/palms down to end the group with grounding.    Therapeutic Interventions/Treatment Strategies:  Psychotherapist offered support, feedback and validation and reinforced use of skills. Treatment modalities used include Cognitive Behavioral Therapy. Interventions include Mindfulness: Facilitated discussion of when/how to use mindfulness skills to benefit general health, mental health symptoms, and stressors and Encouraged a plan to use mindfulness skills in daily life.    Assessment:    Patient response:   Patient responded to session by accepting feedback, giving feedback, listening, focusing on goals, being attentive and appearing alert.   Jose reports having a good week - continues to work a lot.  Does report concerns with not sleeping well - he does work nights but then is not able to  fall asleep when he gets home and has appointments by 11 most days so has been running off of 3-4 hours of sleep.  Also reviewed increased pain and sensitivity with his feet.  He was able to review many skills he has been using with peers and family to maintain his emotional health.  Will follow up with PCP if his feet do not improve - also open to looking at using essential oils on his feet and will look into this.  He also will discuss with his therapist some imagery things to use related to sleep.    We moved on to talking about Mindfulness - Jose reports using mindfulness on a routine basis.  Has been practicing 5 to 1 naturally as a grounding technique.  He also was very engaging in the discussion about mindfulness and was helpful in giving support and feedback to a peer.  Jose participated well in the mindfulness exercises and was open to being intentional about practicing more this week, specifically related to driving home after work and being mindful of the road.    Possible barriers to participation / learning include: and no barriers identified.      Health Issues:   Not sleeping well and increased pain in feet.       Substance Use Review:   Substance Use: No active concerns identified.    Mental Status/Behavioral Observations  Appearance:   Appropriate   Eye Contact:   Good   Psychomotor Behavior: Normal   Attitude:   Cooperative   Orientation:   All  Speech   Rate / Production: Normal    Volume:  Normal   Mood:    Normal  Affect:    Appropriate   Thought Content:   Clear  Thought Form:  Coherent  Logical     Insight:    Good     Plan:     Safety Plan: No current safety concerns identified.  Recommended that patient call 911 or go to the local ED should there be a change in any of these risk factors.     Barriers to treatment: None identified    Patient Contracts (see media tab):  None    Substance Use: Not addressed in session     Continue or Discharge: Patient will continue in the Insightful  Wellness group as planned. Patient is likely to benefit from learning and using skills as they work toward the goals identified in their treatment plan.      Jolanta Thomas, [facility]  July 14, 2023

## 2023-08-01 ENCOUNTER — THERAPY VISIT (OUTPATIENT)
Dept: SLEEP MEDICINE | Facility: HOSPITAL | Age: 28
End: 2023-08-01
Attending: FAMILY MEDICINE
Payer: COMMERCIAL

## 2023-08-01 DIAGNOSIS — E66.01 MORBID OBESITY (H): ICD-10-CM

## 2023-08-01 DIAGNOSIS — R53.83 FATIGUE: ICD-10-CM

## 2023-08-01 DIAGNOSIS — R29.818 TRANSIENT PARALYSIS OF LIMB: ICD-10-CM

## 2023-08-01 PROCEDURE — 95810 POLYSOM 6/> YRS 4/> PARAM: CPT

## 2023-08-01 PROCEDURE — 95811 POLYSOM 6/>YRS CPAP 4/> PARM: CPT | Mod: 26 | Performed by: FAMILY MEDICINE

## 2023-08-01 NOTE — Clinical Note
Split night test ready   Patient works nights and slept during the day before he came in so sleep architecture  is really off for him  but the AHI looks right

## 2023-08-02 NOTE — PROGRESS NOTES
Patient is here with a concern for sleep disordered breathing. No REM sleep was noted , sleep efficiency was 81%. His sleep time was a little over 2 hours with an AHI of 20.5, Low SPO2 of 86% wit less than a minutes under 88%. He was started on CPAP of 5 cmH2O and moved to 6 in an attempt to consolidate his sleep, he was unable to consolidate sleep after treatment started . Heart rate was normal and there was very little leg movements most associated with respiratory events. Patient tolerated test well and treatment fair.

## 2023-08-03 ENCOUNTER — PSYCHE (OUTPATIENT)
Dept: PSYCHOLOGY | Facility: OTHER | Age: 28
End: 2023-08-03
Attending: SOCIAL WORKER
Payer: COMMERCIAL

## 2023-08-03 DIAGNOSIS — F33.2 SEVERE RECURRENT MAJOR DEPRESSION WITHOUT PSYCHOTIC FEATURES (H): Primary | ICD-10-CM

## 2023-08-03 PROCEDURE — 90853 GROUP PSYCHOTHERAPY: CPT

## 2023-08-04 NOTE — CONFIDENTIAL NOTE
"[place]  TRACK: [facility]    PATIENT'S NAME: Jose Alexander  MRN:   [id number]  :   1995  ACCT. NUMBER: [id number]  DATE OF SERVICE: 23  START TIME: 3:00  END TIME: 4:30  Service modality:  In-person    Diagnoses:  296.33 (F33.2) Major Depressive Disorder, Recurrent Episode, Severe _ and With anxious distress;  Generalized Anxiety Disorder      Data:  Session content: At the start of this group, patients were invited to check in by identifying themselves, describing their current emotional status, and identifying issues to address in this group.   Area(s) of treatment focus addressed in this session included Symptom Management.  We reviewed the week as well as skills that were used and skills that could have been used.   After check in, we reviewed skills that were used this week as well as skills that could have been used this past week.  We then reviewed Unconventional Coping Strategies and had each group member choose a couple of these strategies they could practice this week.  We ended the group with A List of Hobbies, a large and categorized list of hobbies.  Each group member selected specific hobbies they do and ones they would like to do and committed to practicing this week.      Therapeutic Interventions/Treatment Strategies:  Psychotherapist offered support, feedback and validation and reinforced use of skills. Treatment modalities used include Cognitive Behavioral Therapy. Interventions include Coping Skills: Assisted patient in identifying 1-2 healthy distraction skills to reduce overall distress, Discussed how the use of intentional \"in the moment\" actions can help reduce distress, Reviewed patients current calming practices and discussed a more formal way of practicing and accessing skills, and Promoted understanding of how and when to apply grounding strategies to reduce distress and increase presence in the moment and Symptoms Management: Promoted understanding " of their diagnoses and how it impacts their functioning.    Assessment:    Patient response:   Patient responded to session by accepting feedback, giving feedback, listening, focusing on goals, being attentive, accepting support, and appearing alert.    Jose reviewed his sleep study - fell asleep right away but when they had him put the Cpap on, he could not go back to sleep.  Realized his sleep cycle is opposite also.  He has a follow up next week - they might do another one closer to the time he is used to sleeping - also reviewed checking to see if he can do one from his home.  Jose engaged well in the group and gave very helpful and supportive feedback to his peers today.    When we reviewed the Unconventional Coping Strategies - he found several he could try including   Watching his dog sleep, using voice changing leah and doing secret random acts of kindness.      As we moved on to the List of Hobbies, Jose was able to find a couple of items in most areas and engaged in a good discussion with the group as he highlighted some things that he enjoys doing and forgot that he liked to do.  He committed to starting to write some music this week.  Good group for Jose as he engaged well in the group and was able to give supportive feedback.    Possible barriers to participation / learning include: and no barriers identified    Health Issues:   None reported       Substance Use Review:   Substance Use: No active concerns identified.    Mental Status/Behavioral Observations  Appearance:   Appropriate   Eye Contact:   Good   Psychomotor Behavior: Normal   Attitude:   Cooperative   Orientation:   All  Speech   Rate / Production: Normal    Volume:  Normal   Mood:    Normal  Affect:    Appropriate   Thought Content:   Clear  Thought Form:  Coherent  Logical     Insight:    Good     Plan:   Safety Plan: No current safety concerns identified.  Recommended that patient call 911 or go to the local ED should there be  a change in any of these risk factors.   Barriers to treatment: None identified  Patient Contracts (see media tab):  None  Substance Use: Not addressed in session   Continue or Discharge: Patient will continue in the [facility] as planned. Patient is likely to benefit from learning and using skills as they work toward the goals identified in their treatment plan.      Jolanta Thomas, [facility]  August 3, 2023

## 2023-08-04 NOTE — PROCEDURES
SLEEP STUDY INTERPRETATION  SPLIT NIGHT STUDY      Patient: RADHA HOLLAND  YOB: 1995  Study Date: 8/1/2023  MRN: [id number]  Referring Provider: Hubert Willard MD  Ordering Provider: Eze De La Torre MD    Indications for Polysomnography: The patient is a 27 year old Male who is 6' and weighs 296.0 lbs. His BMI is 40.5, [place] sleepiness scale 6 and neck circumference is - cm. Relevant medical history includes severe obesity, MDD, anxiety, prior SI with overdose attempt. A diagnostic polysomnogram was performed to evaluate for sleep apnea. After 134.5 minutes of sleep time the patient exhibited sufficient respiratory events qualifying him for a CPAP trial which was then initiated.    Polysomnogram Data: A full night polysomnogram recorded the standard physiologic parameters including EEG, EOG, EMG, ECG, nasal and oral airflow. Respiratory parameters of chest and abdominal movements were recorded with respiratory inductance plethysmography. Oxygen saturation was recorded by pulse oximetry.  Hypopnea scoring rule used: 1B 4%    Diagnostic PSG  Sleep Architecture: No clear REM observed, increased arousal index.  The total recording time of the polysomnogram was 165.5 minutes. The total sleep time was 134.5 minutes. Sleep latency was normal at 11.5 minutes without the use of a sleep aid. REM latency was - minutes. Arousal index was increased at 59.8 arousals per hour. Sleep efficiency was normal at 81.3%. Wake after sleep onset was 19.5 minutes. The patient spent 10.0% of total sleep time in Stage N1, 76.6% in Stage N2, 13.4% in Stage N3, and 0.0% in REM. Time in REM supine was - minutes.    Respiration: Moderate PLACIDO without sleep-associated hypoxemia (SpO2 <= 88% for 0.1 minutes).  Potential that severity under-reported given no REM observed.  Events ? The polysomnogram revealed a presence of 33 obstructive, - central, and - mixed apneas resulting in an apnea index of 14.7 events per hour. There  were 13 obstructive hypopneas and - central hypopneas resulting in an obstructive hypopnea index of 5.8 and central hypopnea index of - events per hour. The combined apnea/hypopnea index was 20.5 events per hour (central apnea/hypopnea index was - events per hour).  The REM AHI was - events per hour. The supine AHI was 43.5 events per hour. The RERA index was - events per hour. The RDI was 20.5 events per hour.  Snoring - was reported as mild / moderate.  Respiratory rate and pattern - was notable for normal respiratory rate and pattern.  Sustained Sleep Associated Hypoventilation - Transcutaneous carbon dioxide monitoring was not used, however significant hypoventilation was not suggested by oximetry.  Sleep Associated Hypoxemia - (Greater than 5 minutes O2 sat at or below 88%) was not present. Baseline oxygen saturation was 94.8%. Lowest oxygen saturation was 86.0%. Time spent less than or equal to 88% was 0.1 minutes. Time spent less than or equal to 89% was 0.1 minutes.     Treatment PSG  Sleep Architecture: CPAP was poorly tolerated, low total sleep time of 23 minutes.  Improved arousal index.  At 12:00:49 AM the patient was placed on PAP treatment and was titrated at pressures ranging from CPAP 5 cmH2O up to CPAP 6 cmH2O. The total recording time of the treatment portion of the study was 81.2 minutes. The total sleep time was 23.0 minutes. During the treatment portion of the study the sleep latency was 5.0 minutes. REM latency was - minutes. Arousal index was normal/increased at 10.4 arousals per hour. Sleep efficiency was normal/increased/decreased at 28.3%. Wake after sleep onset was 53.0 minutes. The patient spent 71.7% of total sleep time in Stage N1, 28.3% in Stage N2, 0.0% in Stage N3, and 0.0% in REM. Time in REM supine was - minutes.     Respiration: CPAP at 5-6 cm H2O appeared effective in lateral / supine NREM, but short amount of sleep time observed and no REM observed on treatment.  The final  pressure was CPAP 6 cmH2O with an AHI of - events per hour. Time in REM supine on final pressure was - minutes.   This titration was considered Adequate (residual AHI with 75% decrease or above constraints without REM?supine sleep at final pressure).    Movement Activity: Nothing of note  Periodic Limb Movements  During the diagnostic portion of the study, there were - PLMs recorded. The PLM index was - movements per hour. The PLM Arousal Index was - per hour.  During the treatment portion of the study, there were - PLMs recorded. The PLM index was - movements per hour. The PLM Arousal Index was - per hour.  REM EMG Activity - Excessive transient/sustained muscle activity was not present.  Nocturnal Behavior - Abnormal sleep related behaviorswere not noted during/arising out of NREM / REM sleep.   Bruxism - None apparent.    Cardiac Summary: Sinus rhythm, period of persistent mild tachycardia from ~2215 - 2320.  During the diagnostic portion of the study, the average pulse rate was 94.8 bpm. The minimum pulse rate was 76.0 bpm while the maximum pulse rate was 115.0 bpm.    During the treatment portion of the study, the average pulse rate was 85.6 bpm. The minimum pulse rate was 73.0 bpm while the maximum pulse rate was 111.0 bpm.     Assessment:   Moderate PLACIDO without sleep-associated hypoxemia (SpO2 <= 88% for 0.1 minutes).  Potential that severity under-reported given no REM observed.  CPAP was poorly tolerated, low total sleep time of 23 minutes.  Improved arousal index.  EKG consistent with sinus rhythm, period of persistent mild tachycardia from ~2215 - 2320.    Recommendations:  Treatment of PLACIDO with Auto?titrating PAP therapy with a range of 5 cmH2O to 8 cmH2O. Recommend clinical follow up with sleep management team, including review of compliance measures.  Patient may be a candidate for dental appliance through referral to Sleep Dentistry for the treatment of obstructive sleep apnea and/or socially  disruptive snoring.  If devices are not acceptable or effective, patient may benefit from evaluation of possible surgical options for the treatment of obstructive sleep apnea and/or socially disruptive snoring. If he is interested, would recommend referral to specialized ENT?Sleep provider.  Advice regarding the risks of drowsy driving.  Suggest optimizing sleep schedule and avoiding sleep deprivation.  Weight management (if BMI > 30).    Diagnostic Codes:   Obstructive Sleep Apnea G47.33     _____________________________________   Electronically Signed By: Eze De La Torre MD (8/4/2023)

## 2023-08-07 NOTE — PROGRESS NOTES
"Jose Alexander is a 27 year old male who is being evaluated via a billable video visit.       The patient has been notified of following:      \"This video visit will be conducted via a call between you and your physician/provider. We have found that certain health care needs can be provided without the need for an in-person physical exam.  This service lets us provide the care you need with a video conversation.  If a prescription is necessary we can send it directly to your pharmacy.  If lab work is needed we can place an order for that and you can then stop by our lab to have the test done at a later time.     Video visits are billed at different rates depending on your insurance coverage.  Please reach out to your insurance provider with any questions.     If during the course of the call the physician/provider feels a video visit is not appropriate, you will not be charged for this service.\"     Patient has given verbal consent for Video visit? Yes  How would you like to obtain your AVS? Mail a copy  If you are dropped from the video visit, the video invite should be resent to: Text to cell phone: -  Will anyone else be joining your video visit? No  If patient encounters technical issues they should call [phone]      Video-Visit Details     Type of service:  Video Visit     Start Time:  1pm  End Time: 1:21 PM    Originating Location (pt. Location): Home     Distant Location (provider location):  Off-site, [facility] [place]       Platform used for Video Visit: [employer]    Virtual visit for review of sleep testing results.     A/P:     1.)  Moderate PLACIDO (AHI 20.5) without sleep-associated hypoxemia (SpO2 <= 88% for 0.1 minutes)   - Appeared controlled with CPAP 5-6 cm H2O in supine NREM on PSG  -He is overall motivated to start CPAP, so I placed orders for CPAP auto titrate 5-10 cm H2O.  - Plan for follow-up 4 to 6 weeks after starting CPAP.     2.) Severe / Morbid Obesity:   - Counseled " "on the importance of strict adherence to diet, an exercise routine, and reducing weight.  Weight loss would improve sleep-disordered breathing.  Estimated body mass index is 40.24 kg/m  as calculated from the following:    Height as of 1/3/23: 1.829 m (6').    Weight as of 1/23/23: 134.6 kg (296 lb 11.2 oz).     3.)  Sleep onset insomnia  - Appears to have quite delayed sleep phase, potential 2nd or 3rd shift work.  - Components of psychophysiological insomnia with read / TV / phone in bed.    SUBJECTIVE:  Jose Alexander is a 27 year old male.    27 year old yo male who is referred for concern for sleep-disordered breathing.     Pertinent PMHx of severe obesity, MDD, anxiety, prior SI with overdose attempt.     STOP-BANG score of 4, with unknown neck circumference.  [place] score of 6.  BMI of Estimated body mass index is 40.24 kg/m  as calculated from the following:    Height as of 1/3/23: 1.829 m (6').    Weight as of 1/23/23: 134.6 kg (296 lb 11.2 oz).      Today -we reviewed his sleep study results in detail.  He was motivated to come for evaluation given recommendation from his family but also during his hospitalization in December for bilateral frostbite to his feet.    Per questionnaire: \"Snoring, trouble sleeping, over sleeping\"     Sxs for 4-8 years, no prior sleep testing.     Caffeine use:  No for 3+ per day.  No for within 6 hours of bed.     Tobacco use: Yes     Sleep pattern:  Workdays.  5am to 4-5pm, total sleep time 8-10 hours.  Weekends.  5-6am to 4-6pm, total sleep time 8-10 hours.  Time to fall asleep: ~2-3 hours.  Awakenings: 1-2 times per night, 5 minutes to return to sleep, awake for total of 1 hours per night.  Napping.  0 days per week, - hours per nap.     Sleep Habits:   Do you read in bed? Yes  Do you eat in bed? No  Do you watch TV in bed? Yes  Do you work in bed? No  Do you use a phone or computer in bed? Yes     No for RLS screen.  No for sleep walking.  No for dream enactment " behavior.  No for bruxism.     Yes for morning headaches.  No for snoring.  No for observed apnea.  Yes for FHx of PLACIDO and at least 3-4 family numbers including his maternal aunt and maternal grandfather.  He does not have much knowledge of his father's medical history.     SHx:  Single, lives with mother, step father, brother.  Working as     SLEEP STUDY INTERPRETATION  SPLIT NIGHT STUDY        Patient: RADHA HOLLAND  YOB: 1995  Study Date: 8/1/2023  MRN: [id number]  Referring Provider: Hubert Willard MD  Ordering Provider: Eze De La Torre MD     Indications for Polysomnography: The patient is a 27 year old Male who is 6' and weighs 296.0 lbs. His BMI is 40.5, [place] sleepiness scale 6 and neck circumference is - cm. Relevant medical history includes severe obesity, MDD, anxiety, prior SI with overdose attempt. A diagnostic polysomnogram was performed to evaluate for sleep apnea. After 134.5 minutes of sleep time the patient exhibited sufficient respiratory events qualifying him for a CPAP trial which was then initiated.     Polysomnogram Data: A full night polysomnogram recorded the standard physiologic parameters including EEG, EOG, EMG, ECG, nasal and oral airflow. Respiratory parameters of chest and abdominal movements were recorded with respiratory inductance plethysmography. Oxygen saturation was recorded by pulse oximetry.  Hypopnea scoring rule used: 1B 4%     Diagnostic PSG  Sleep Architecture: No clear REM observed, increased arousal index.  The total recording time of the polysomnogram was 165.5 minutes. The total sleep time was 134.5 minutes. Sleep latency was normal at 11.5 minutes without the use of a sleep aid. REM latency was - minutes. Arousal index was increased at 59.8 arousals per hour. Sleep efficiency was normal at 81.3%. Wake after sleep onset was 19.5 minutes. The patient spent 10.0% of total sleep time in Stage N1, 76.6% in Stage N2, 13.4% in Stage N3, and  0.0% in REM. Time in REM supine was - minutes.     Respiration: Moderate PLACIDO without sleep-associated hypoxemia (SpO2 <= 88% for 0.1 minutes).  Potential that severity under-reported given no REM observed.  Events ? The polysomnogram revealed a presence of 33 obstructive, - central, and - mixed apneas resulting in an apnea index of 14.7 events per hour. There were 13 obstructive hypopneas and - central hypopneas resulting in an obstructive hypopnea index of 5.8 and central hypopnea index of - events per hour. The combined apnea/hypopnea index was 20.5 events per hour (central apnea/hypopnea index was - events per hour).  The REM AHI was - events per hour. The supine AHI was 43.5 events per hour. The RERA index was - events per hour. The RDI was 20.5 events per hour.  Snoring - was reported as mild / moderate.  Respiratory rate and pattern - was notable for normal respiratory rate and pattern.  Sustained Sleep Associated Hypoventilation - Transcutaneous carbon dioxide monitoring was not used, however significant hypoventilation was not suggested by oximetry.  Sleep Associated Hypoxemia - (Greater than 5 minutes O2 sat at or below 88%) was not present. Baseline oxygen saturation was 94.8%. Lowest oxygen saturation was 86.0%. Time spent less than or equal to 88% was 0.1 minutes. Time spent less than or equal to 89% was 0.1 minutes.      Treatment PSG  Sleep Architecture: CPAP was poorly tolerated, low total sleep time of 23 minutes.  Improved arousal index.  At 12:00:49 AM the patient was placed on PAP treatment and was titrated at pressures ranging from CPAP 5 cmH2O up to CPAP 6 cmH2O. The total recording time of the treatment portion of the study was 81.2 minutes. The total sleep time was 23.0 minutes. During the treatment portion of the study the sleep latency was 5.0 minutes. REM latency was - minutes. Arousal index was normal/increased at 10.4 arousals per hour. Sleep efficiency was normal/increased/decreased at  28.3%. Wake after sleep onset was 53.0 minutes. The patient spent 71.7% of total sleep time in Stage N1, 28.3% in Stage N2, 0.0% in Stage N3, and 0.0% in REM. Time in REM supine was - minutes.      Respiration: CPAP at 5-6 cm H2O appeared effective in lateral / supine NREM, but short amount of sleep time observed and no REM observed on treatment.  The final pressure was CPAP 6 cmH2O with an AHI of - events per hour. Time in REM supine on final pressure was - minutes.   This titration was considered Adequate (residual AHI with 75% decrease or above constraints without REM?supine sleep at final pressure).     Movement Activity: Nothing of note  Periodic Limb Movements  During the diagnostic portion of the study, there were - PLMs recorded. The PLM index was - movements per hour. The PLM Arousal Index was - per hour.  During the treatment portion of the study, there were - PLMs recorded. The PLM index was - movements per hour. The PLM Arousal Index was - per hour.  REM EMG Activity - Excessive transient/sustained muscle activity was not present.  Nocturnal Behavior - Abnormal sleep related behaviorswere not noted during/arising out of NREM / REM sleep.   Bruxism - None apparent.     Cardiac Summary: Sinus rhythm, period of persistent mild tachycardia from ~2215 - 2320.  During the diagnostic portion of the study, the average pulse rate was 94.8 bpm. The minimum pulse rate was 76.0 bpm while the maximum pulse rate was 115.0 bpm.     During the treatment portion of the study, the average pulse rate was 85.6 bpm. The minimum pulse rate was 73.0 bpm while the maximum pulse rate was 111.0 bpm.      Assessment:   Moderate PLACIDO without sleep-associated hypoxemia (SpO2 <= 88% for 0.1 minutes).  Potential that severity under-reported given no REM observed.  CPAP was poorly tolerated, low total sleep time of 23 minutes.  Improved arousal index.  EKG consistent with sinus rhythm, period of persistent mild tachycardia from ~2215 -  2320.     Recommendations:  Treatment of PLACIDO with Auto?titrating PAP therapy with a range of 5 cmH2O to 8 cmH2O. Recommend clinical follow up with sleep management team, including review of compliance measures.  Patient may be a candidate for dental appliance through referral to Sleep Dentistry for the treatment of obstructive sleep apnea and/or socially disruptive snoring.  If devices are not acceptable or effective, patient may benefit from evaluation of possible surgical options for the treatment of obstructive sleep apnea and/or socially disruptive snoring. If he is interested, would recommend referral to specialized ENT?Sleep provider.  Advice regarding the risks of drowsy driving.  Suggest optimizing sleep schedule and avoiding sleep deprivation.  Weight management (if BMI > 30).     Diagnostic Codes:   Obstructive Sleep Apnea G47.33     _____________________________________   Electronically Signed By: Eze De La Torre MD (8/4/2023)       Past medical history:    Patient Active Problem List    Diagnosis Date Noted    Morbid obesity (H) 01/23/2023     Priority: Medium    Strep pharyngitis 12/31/2022     Priority: Medium    Frostbite of both feet 12/20/2022     Priority: Medium    Suicide attempt (H) 11/08/2021     Priority: Medium    Suicidal overdose, subsequent encounter 11/08/2021     Priority: Medium    Moderate episode of recurrent major depressive disorder (H) 09/14/2021     Priority: Medium    Major depressive disorder, recurrent severe without psychotic features (H) 09/14/2021     Priority: Medium    Anxiety disorder 06/04/2019     Priority: Medium    BPPV (benign paroxysmal positional vertigo) 04/10/2012     Priority: Medium     Formatting of this note might be different from the original.  IMO Update 10/11      Pes planovalgus 08/26/2010     Priority: Medium    Other acne 03/31/2010     Priority: Medium    Seborrheic dermatitis, unspecified 03/31/2010     Priority: Medium     Formatting of this note  might be different from the original.  IMO Update 10/11      Bilateral knee pain 01/08/2009     Priority: Medium    Headache 11/11/2005     Priority: Medium     Formatting of this note might be different from the original.  Could be sinus allergies  IMO Update 10 2016         10 point ROS of systems including Constitutional, Eyes, Respiratory, Cardiovascular, Gastroenterology, Genitourinary, Integumentary, Muscularskeletal, Psychiatric were all negative except for pertinent positives noted in my HPI.    Current Outpatient Medications   Medication Sig Dispense Refill    busPIRone (BUSPAR) 10 MG tablet Take 1 tablet (10 mg) by mouth 3 times daily for 30 days Start with 10 mg twice a day for a few days and then increase to TID if needed. 90 tablet 0    diclofenac (VOLTAREN) 1 % topical gel Apply 4 g topically 4 times daily -apply gel to both feet, avoiding open area and rub into skin gently; apply to entire joint. 350 g 0    DULoxetine (CYMBALTA) 30 MG capsule Take 1 capsule (30 mg) by mouth 2 times daily 60 capsule 0    gabapentin (NEURONTIN) 800 MG tablet Take 1 tablet (800 mg) by mouth 3 times daily 90 tablet 0    ibuprofen (ADVIL/MOTRIN) 400 MG tablet Take 400 mg by mouth every 6 hours as needed -administer with food.         OBJECTIVE:  There were no vitals taken for this visit.    Physical Exam     ---  This note was written with the assistance of the Dragon voice-dictation technology software. The final document, although reviewed, may contain errors. For corrections, please contact the office.    Total time spent preparing to see the patient, review of chart, obtaining history and physical examination, review of sleep testing, review of treatment options, education, discussion with patient and documenting in Epic / EMR was 25 minutes.  All time involved was spent on the day of service for the patient (the same day as the patient's appointment).    Eze De La Torre MD    Sleep Medicine  [facility]  Pediatric [facility]  - [place], [place]  Main Office: [phone]  [place] [facility] [place] [address], [address]  Schedule visits: [phone]  Main Office: [phone]  Fax: [phone]

## 2023-08-08 ENCOUNTER — VIRTUAL VISIT (OUTPATIENT)
Dept: PULMONOLOGY | Facility: OTHER | Age: 28
End: 2023-08-08
Attending: FAMILY MEDICINE
Payer: COMMERCIAL

## 2023-08-08 VITALS — HEIGHT: 72 IN | BODY MASS INDEX: 37.93 KG/M2 | WEIGHT: 280 LBS

## 2023-08-08 DIAGNOSIS — G47.33 OSA (OBSTRUCTIVE SLEEP APNEA): Primary | ICD-10-CM

## 2023-08-08 PROCEDURE — 99213 OFFICE O/P EST LOW 20 MIN: CPT | Mod: VID | Performed by: FAMILY MEDICINE

## 2023-08-08 ASSESSMENT — PAIN SCALES - GENERAL: PAINLEVEL: NO PAIN (0)

## 2023-08-08 NOTE — NURSING NOTE
Is the patient currently in the state of [place]? YES    Visit mode:VIDEO    If the visit is dropped, the patient can be reconnected by: VIDEO VISIT: Send to e-mail at: [email or web address]    Will anyone else be joining the visit? NO      How would you like to obtain your AVS? MyChart    Are changes needed to the allergy or medication list? NO    Reason for visit: RECHECK      Has patient had flu shot for current/most recent flu season? If so, when? No      Bernice HERNANDEZ

## 2023-08-08 NOTE — PROGRESS NOTES
"Virtual Visit Details    Type of service:  Video Visit   Video Start Time: {video visit start/end time for provider to select:929651}  Video End Time:{video visit start/end time for provider to select:040071}    Originating Location (pt. Location): {video visit patient location:965893::\"Home\"}  {PROVIDER LOCATION On-site should be selected for visits conducted from your clinic location or adjoining [facility] hospital, academic office, or other nearby [facility] building. Off-site should be selected for all other provider locations, including home:330213}  Distant Location (provider location):  {virtual location provider:794440}  Platform used for Video Visit: {Virtual Visit Platforms:431419::\"[employer]\"}    "

## 2023-08-10 ENCOUNTER — OFFICE VISIT (OUTPATIENT)
Dept: PSYCHOLOGY | Facility: OTHER | Age: 28
End: 2023-08-10
Attending: SOCIAL WORKER
Payer: COMMERCIAL

## 2023-08-10 DIAGNOSIS — F33.2 SEVERE RECURRENT MAJOR DEPRESSION WITHOUT PSYCHOTIC FEATURES (H): Primary | ICD-10-CM

## 2023-08-10 PROCEDURE — 90853 GROUP PSYCHOTHERAPY: CPT

## 2023-08-14 NOTE — CONFIDENTIAL NOTE
[place]  TRACK: [facility]    PATIENT'S NAME: Jose Alexander  MRN:   [id number]  :   1995  ACCT. NUMBER: [id number]  DATE OF SERVICE: 8/10/23  START TIME: 3:00 PM  END TIME: 4:30 PM  Service modality:  In-person    Diagnoses:  296.33 (F33.2) Major Depressive Disorder, Recurrent Episode, Severe _ and With anxious distress;  Generalized Anxiety Disorder      Data:  Session content: At the start of this group, patients were invited to check in by identifying themselves, describing their current emotional status, and identifying issues to address in this group.   Area(s) of treatment focus addressed in this session included Symptom Management. We reviewed the week as well as skills that were used and skills that could have been used. After check in, we reviewed skills that were used this week as well as skills that could have been used this past week. We reviewed and reflected on the [place] [facility] [address]. We discussed statements that the group fully agreed with vs not at agreed with. Following this we looked over Opportunities of Change in our lives and steps to take. Each member described a negative action from the [address] [place] [address] and steps to take to change it into a positive.     Therapeutic Interventions/Treatment Strategies:  Psychotherapist offered support, feedback and validation and reinforced use of skills. Treatment modalities used include Cognitive Behavioral Therapy. Interventions include assisting patient in identifying 1-2 healthy distraction skills to reduce overall distress. Worked with group on identifying manageable stress and strategies to cope with it. After check in, we reviewed skills that were used this week as well as skills that could have been used this past week.  Reviewed the homework/question of the week:     Assessment:    Patient response: Patient responded to session by accepting feedback, giving feedback, listening, focusing on goals, being attentive, accepting  support, appearing alert, and demonstrating behavior change. Jose reports having a good week and shares he is still working may hours. He explains how he is still working on the sleep study which he hopes will result in a CPAP shortly. Other than sleep he says he can't complain. Explored further with Jose regarding his hx and work. Jose is receptive to feedback regarding his Wellness Way. He shares his struggle with different areas in his life and how certain areas are tougher to change than others. One that really stuck out to him was that he takes care of others more than himself. He explains this as him reaching out to help others more than he should. He was able to identify skills and resources to help him in this area such as setting boundaries with his brother and friends.     Jose participated well and was able to fully engage with the group. He is intentional in working towards his goals and practice his skills. He shares he would like to be more mindful of his goals in life and how he can achieve them. Then use his skill Just Do It.     Possible barriers to participation / learning include:  Possible work schedule issues.     Health Issues:              None reported                  Substance Use Review:              Substance Use: No active concerns identified.     Mental Status/Behavioral Observations  Appearance:                            Appropriate   Eye Contact:                           Good   Psychomotor Behavior:          Normal   Attitude:                                   Cooperative   Orientation:                             All  Speech              Rate / Production:       Normal               Volume:                       Normal   Mood:                                      Normal  Affect:                                      Appropriate   Thought Content:                    Clear  Thought Form:                        Coherent  Logical     Insight:                                      Good      Plan:   Safety Plan: No current safety concerns identified.  Recommended that patient call 911 or go to the local ED should there be a change in any of these risk factors.   Barriers to treatment: None identified  Patient Contracts (see media tab):  None  Substance Use: Not addressed in session   Continue or Discharge: Patient will continue in the [facility] group as planned. Patient is likely to benefit from learning and using skills as they work toward the goals identified in their treatment plan.    SHANNA JEAN BAPTISTE  August 10, 2023

## 2023-08-17 ENCOUNTER — OFFICE VISIT (OUTPATIENT)
Dept: PSYCHOLOGY | Facility: OTHER | Age: 28
End: 2023-08-17
Attending: SOCIAL WORKER
Payer: COMMERCIAL

## 2023-08-17 DIAGNOSIS — F33.2 SEVERE RECURRENT MAJOR DEPRESSION WITHOUT PSYCHOTIC FEATURES (H): Primary | ICD-10-CM

## 2023-08-17 PROCEDURE — 90853 GROUP PSYCHOTHERAPY: CPT

## 2023-08-18 NOTE — CONFIDENTIAL NOTE
[facility]  TRACK: Insightful Wellness     PATIENT'S NAME: Jose Alexander  MRN:   [id number]  :   1995  ACCT. NUMBER: [id number]  DATE OF SERVICE: 23  START TIME: 3:00 PM  END TIME: 4:30 PM  Service modality:  In-person    Diagnoses: 296.33 (F33.2) Major Depressive Disorder, Recurrent Episode, Severe _ and With anxious distress;  Generalized Anxiety Disorder     Data:    Session content: At the start of this group, patients were invited to check in by identifying themselves, describing their current emotional status, and identifying issues to address in this group. Area(s) of treatment focus addressed in this session included Symptom Management. We reviewed the week as well as skills that were used and skills that could have been used. After check in, we reviewed skills that were used this week as well as skills that could have been used this past week. We reviewed and reflected on the WRAP dorman concepts. Jose shares and discusses his most valuable take away's from the packet including; what personal responsibility means to him, what self advocacy means to him, and what is he like on his best days. Jose reveals that personal responsibility means taking care of himself and doing things for himself and not others. He states self advocacy means speaking up for himself and getting better. Lastly he shares on his best days he is happy, taking his medications, going to work, and staying in the present moment.      Therapeutic Interventions/Treatment Strategies:  Psychotherapist offered support, feedback and validation, provided redirection, and reinforced use of skills. Treatment modalities used include Motivational Interviewing and Dialectical Behavioral Therapy. Interventions include Mindfulness: Facilitated discussion of when/how to use mindfulness skills to benefit general health, mental health symptoms, and stressors and Encouraged a plan to use mindfulness skills in daily life, Symptoms  Management: Promoted understanding of their diagnoses and how it impacts their functioning, Emotions Management:  Discussed barriers to emotional regulation, Reviewed opposite action skill, and Increased awareness of daily mood patterns/changes, and Relationship Skills: Assisted patients in implementing more effective communication skills in their relationships, Encouraged development and maintenance  of healthy boundaries, and Discussed relationships and ways to reduce conflict . After check in, we reviewed skills that were used this week as well as skills that could have been used this past week.     Assessment:    Patient response:   Patient responded to session by accepting feedback, giving feedback, listening, focusing on goals, being attentive, accepting support, appearing alert, and verbalizing understanding. Jose states there wasn't much to report since Tuesday except he may be working a new schedule that conflicts with groups. 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 Jose is able to identify skills to use to help manage his symptoms including deep breathing, writing his music, and self care. Jose is receptive to feedback regarding his WRAP tool box. He is able to identify areas of progress and struggle within his life regarding his wellness. Jose participated well and was able to fully engage with the group. He is intentional in working towards his goals and practice his skills.      Possible barriers to participation / learning include: distracted by work.     Health Issues:   None reported       Substance Use Review:   Substance Use: No active concerns identified.    Mental Status/Behavioral Observations  Appearance:   Appropriate   Eye Contact:   Good   Psychomotor Behavior: Normal   Attitude:   Cooperative  Interested  Orientation:   All  Speech   Rate / Production: Normal/ Responsive Normal    Volume:  Normal    Mood:    Normal  Affect:    Appropriate   Thought Content:   Clear  Thought Form:  Coherent  Logical     Insight:    Good     Plan:   Safety Plan: No current safety concerns identified.  Recommended that patient call 911 or go to the local ED should there be a change in any of these risk factors.   Barriers to treatment: None identified  Patient Contracts (see media tab):  None  Substance Use: Not addressed in session   Continue or Discharge: Patient will continue in the Insightful Wellness group as planned. Patient is likely to benefit from learning and using skills as they work toward the goals identified in their treatment plan.      SHANNA JEAN BAPTISTE  August 17, 2023

## 2023-08-21 ENCOUNTER — DOCUMENTATION ONLY (OUTPATIENT)
Dept: HOME HEALTH SERVICES | Facility: CLINIC | Age: 28
End: 2023-08-21
Payer: COMMERCIAL

## 2023-08-21 DIAGNOSIS — G47.33 OSA (OBSTRUCTIVE SLEEP APNEA): Primary | ICD-10-CM

## 2023-08-21 NOTE — PROGRESS NOTES
Patient was offered choice of vendor and chose [facility].  Patient Jose Alexander was set up at [employer] [facility] [place] on August 21, 2023. Patient received a Resmed Airsense 11 Pressures were set at  5-15 cm H2O.   Patient s ramp is Off and FLEX/EPR is EPR, 2.  Patient received a Spootr Mask name: Ramona  Full Face mask size S/M, heated tubing and heated humidifier.  Patient has the following compliance requirements: usage only  Patient has a follow up on tbd with Dr. De La Torre.    Mike Veras

## 2023-08-24 ENCOUNTER — OFFICE VISIT (OUTPATIENT)
Dept: PSYCHOLOGY | Facility: OTHER | Age: 28
End: 2023-08-24
Attending: SOCIAL WORKER
Payer: COMMERCIAL

## 2023-08-24 DIAGNOSIS — F33.2 SEVERE RECURRENT MAJOR DEPRESSION WITHOUT PSYCHOTIC FEATURES (H): Primary | ICD-10-CM

## 2023-08-24 PROCEDURE — 90853 GROUP PSYCHOTHERAPY: CPT

## 2023-08-25 LAB — SLPCOMP: NORMAL

## 2023-08-25 NOTE — CONFIDENTIAL NOTE
[facility]  TRACK: Insightful Wellness     PATIENT'S NAME: Jose Alexander  MRN:   [id number]  :   1995  ACCT. NUMBER: [id number]  DATE OF SERVICE: 23  START TIME: 3:00 PM  END TIME: 4:30 PM  Service modality:  In-person    Diagnoses: 296.33 (F33.2) Major Depressive Disorder, Recurrent Episode, Severe _ and With anxious distress;  Generalized Anxiety Disorder        Data:    Session content: At the start of this group, patients were invited to check in by identifying themselves, describing their current emotional status, and identifying issues to address in this group. Area(s) of treatment focus addressed in this session included Symptom Management. We reviewed the week as well as skills that were used and skills that could have been used. After check in, we reviewed skills that were used this week as well as skills that could have been used this past week. We reviewed and reflected on Resilience and Protective Factors. Jose shares and discusses his strengths and weaknesses within his protective factors. He states he is strong in his sense of purpose as he is good at setting boundaries and values his work ethic. He states he could work on his self- esteem and be more open to accepting his flaws and past history. He shares his insight on his psychical health that he would also like to work on and explains how he will use his skills; 3 good things, Just Do It, and Meal prepping to help improve his psychical health.       Therapeutic Interventions/Treatment Strategies:  Psychotherapist offered support, feedback and validation, provided redirection, and reinforced use of skills. Treatment modalities used include Motivational Interviewing and Dialectical Behavioral Therapy. Interventions include Mindfulness: Facilitated discussion of when/how to use mindfulness skills to benefit general health, mental health symptoms, and stressors and Encouraged a plan to use mindfulness skills in daily life,  Symptoms Management: Promoted understanding of their diagnoses and how it impacts their functioning, Emotions Management:  Discussed barriers to emotional regulation, Reviewed opposite action skill, and Increased awareness of daily mood patterns/changes, and Relationship Skills: Assisted patients in implementing more effective communication skills in their relationships, Encouraged development and maintenance  of healthy boundaries, and Discussed relationships and ways to reduce conflict . After check in, we reviewed skills that were used this week as well as skills that could have been used this past week.     Assessment:    Patient response:   Patient responded to session by accepting feedback, giving feedback, listening, focusing on goals, being attentive, accepting support, appearing alert, and verbalizing understanding. Jose states he is still getting used to his CPAP and was unable to get a good nights rest prior. He shares he is still figuring out his work schedule but will have to work a 12 hour job in  and ponders how he will get it done. Jose shares he is debt free and will be able to look into his own housing now. Jose is able to identify skills to use to help manage his symptoms including deep breathing, Radical Acceptance, and self care. Jose is receptive to feedback regarding resilience and his protective factors.  He is able to identify areas of progress and struggle within his life regarding his wellness. Jose participated well and was able to fully engage with the group. He is intentional in working towards his goals and practice his skills.       Possible barriers to participation / learning include: and no barriers identified    Health Issues:   None reported       Substance Use Review:   Substance Use: No active concerns identified.    Mental Status/Behavioral Observations  Appearance:   Appropriate   Eye Contact:   Good   Psychomotor Behavior: Normal   Attitude:   Cooperative    Orientation:   All  Speech   Rate / Production: Normal    Volume:  Normal   Mood:    Normal  Affect:    Appropriate   Thought Content:   Clear  Thought Form:  Coherent  Logical     Insight:    Good     Plan:   Safety Plan: No current safety concerns identified.  Recommended that patient call 911 or go to the local ED should there be a change in any of these risk factors.   Barriers to treatment: None identified  Patient Contracts (see media tab):  None  Substance Use: Not addressed in session   Continue or Discharge: Patient will continue in Insightful Wellness group as planned. Patient is likely to benefit from learning and using skills as they work toward the goals identified in their treatment plan.      SHANNA JEAN BAPTISTE  August 24, 2023

## 2023-08-29 ENCOUNTER — HOSPITAL ENCOUNTER (EMERGENCY)
Facility: HOSPITAL | Age: 28
Discharge: HOME | End: 2023-08-29
Attending: STUDENT IN AN ORGANIZED HEALTH CARE EDUCATION/TRAINING PROGRAM | Admitting: STUDENT IN AN ORGANIZED HEALTH CARE EDUCATION/TRAINING PROGRAM
Payer: COMMERCIAL

## 2023-08-29 ENCOUNTER — APPOINTMENT (OUTPATIENT)
Dept: CT IMAGING | Facility: HOSPITAL | Age: 28
End: 2023-08-29
Attending: STUDENT IN AN ORGANIZED HEALTH CARE EDUCATION/TRAINING PROGRAM
Payer: COMMERCIAL

## 2023-08-29 VITALS
DIASTOLIC BLOOD PRESSURE: 74 MMHG | HEART RATE: 84 BPM | OXYGEN SATURATION: 96 % | WEIGHT: 296.2 LBS | TEMPERATURE: 97.3 F | BODY MASS INDEX: 40.17 KG/M2 | RESPIRATION RATE: 16 BRPM | SYSTOLIC BLOOD PRESSURE: 116 MMHG

## 2023-08-29 DIAGNOSIS — M54.50 ACUTE RIGHT-SIDED LOW BACK PAIN WITHOUT SCIATICA: ICD-10-CM

## 2023-08-29 LAB
ALBUMIN SERPL BCG-MCNC: 4.8 G/DL (ref 3.5–5.2)
ALBUMIN UR-MCNC: NEGATIVE MG/DL
ALP SERPL-CCNC: 83 U/L (ref 40–129)
ALT SERPL W P-5'-P-CCNC: 59 U/L (ref 0–70)
ANION GAP SERPL CALCULATED.3IONS-SCNC: 10 MMOL/L (ref 7–15)
APPEARANCE UR: CLEAR
AST SERPL W P-5'-P-CCNC: 37 U/L (ref 0–45)
BASOPHILS # BLD AUTO: 0 10E3/UL (ref 0–0.2)
BASOPHILS NFR BLD AUTO: 1 %
BILIRUB SERPL-MCNC: 0.6 MG/DL
BILIRUB UR QL STRIP: NEGATIVE
BUN SERPL-MCNC: 14 MG/DL (ref 6–20)
CALCIUM SERPL-MCNC: 9.8 MG/DL (ref 8.6–10)
CHLORIDE SERPL-SCNC: 102 MMOL/L (ref 98–107)
COLOR UR AUTO: ABNORMAL
CREAT SERPL-MCNC: 0.57 MG/DL (ref 0.67–1.17)
DEPRECATED HCO3 PLAS-SCNC: 28 MMOL/L (ref 22–29)
EOSINOPHIL # BLD AUTO: 0.2 10E3/UL (ref 0–0.7)
EOSINOPHIL NFR BLD AUTO: 3 %
ERYTHROCYTE [DISTWIDTH] IN BLOOD BY AUTOMATED COUNT: 12.7 % (ref 10–15)
GFR SERPL CREATININE-BSD FRML MDRD: >90 ML/MIN/1.73M2
GLUCOSE SERPL-MCNC: 91 MG/DL (ref 70–99)
GLUCOSE UR STRIP-MCNC: NEGATIVE MG/DL
HCT VFR BLD AUTO: 48 % (ref 40–53)
HGB BLD-MCNC: 16.2 G/DL (ref 13.3–17.7)
HGB UR QL STRIP: NEGATIVE
HOLD SPECIMEN: NORMAL
IMM GRANULOCYTES # BLD: 0 10E3/UL
IMM GRANULOCYTES NFR BLD: 1 %
KETONES UR STRIP-MCNC: NEGATIVE MG/DL
LEUKOCYTE ESTERASE UR QL STRIP: NEGATIVE
LYMPHOCYTES # BLD AUTO: 2.2 10E3/UL (ref 0.8–5.3)
LYMPHOCYTES NFR BLD AUTO: 39 %
MCH RBC QN AUTO: 28.9 PG (ref 26.5–33)
MCHC RBC AUTO-ENTMCNC: 33.8 G/DL (ref 31.5–36.5)
MCV RBC AUTO: 86 FL (ref 78–100)
MONOCYTES # BLD AUTO: 0.6 10E3/UL (ref 0–1.3)
MONOCYTES NFR BLD AUTO: 11 %
MUCOUS THREADS #/AREA URNS LPF: PRESENT /LPF
NEUTROPHILS # BLD AUTO: 2.6 10E3/UL (ref 1.6–8.3)
NEUTROPHILS NFR BLD AUTO: 45 %
NITRATE UR QL: NEGATIVE
NRBC # BLD AUTO: 0 10E3/UL
NRBC BLD AUTO-RTO: 0 /100
PH UR STRIP: 5.5 [PH] (ref 4.7–8)
PLATELET # BLD AUTO: 233 10E3/UL (ref 150–450)
POTASSIUM SERPL-SCNC: 4.5 MMOL/L (ref 3.4–5.3)
PROT SERPL-MCNC: 7.5 G/DL (ref 6.4–8.3)
RBC # BLD AUTO: 5.61 10E6/UL (ref 4.4–5.9)
RBC URINE: <1 /HPF
SODIUM SERPL-SCNC: 140 MMOL/L (ref 136–145)
SP GR UR STRIP: 1.02 (ref 1–1.03)
SQUAMOUS EPITHELIAL: 0 /HPF
UROBILINOGEN UR STRIP-MCNC: NORMAL MG/DL
WBC # BLD AUTO: 5.5 10E3/UL (ref 4–11)
WBC URINE: <1 /HPF

## 2023-08-29 PROCEDURE — 85014 HEMATOCRIT: CPT | Performed by: STUDENT IN AN ORGANIZED HEALTH CARE EDUCATION/TRAINING PROGRAM

## 2023-08-29 PROCEDURE — 96374 THER/PROPH/DIAG INJ IV PUSH: CPT | Mod: XU

## 2023-08-29 PROCEDURE — 81001 URINALYSIS AUTO W/SCOPE: CPT | Performed by: STUDENT IN AN ORGANIZED HEALTH CARE EDUCATION/TRAINING PROGRAM

## 2023-08-29 PROCEDURE — 250N000011 HC RX IP 250 OP 636: Mod: JZ | Performed by: STUDENT IN AN ORGANIZED HEALTH CARE EDUCATION/TRAINING PROGRAM

## 2023-08-29 PROCEDURE — 74177 CT ABD & PELVIS W/CONTRAST: CPT

## 2023-08-29 PROCEDURE — 99285 EMERGENCY DEPT VISIT HI MDM: CPT | Mod: 25

## 2023-08-29 PROCEDURE — 84450 TRANSFERASE (AST) (SGOT): CPT | Performed by: STUDENT IN AN ORGANIZED HEALTH CARE EDUCATION/TRAINING PROGRAM

## 2023-08-29 PROCEDURE — 99284 EMERGENCY DEPT VISIT MOD MDM: CPT | Performed by: STUDENT IN AN ORGANIZED HEALTH CARE EDUCATION/TRAINING PROGRAM

## 2023-08-29 PROCEDURE — 250N000013 HC RX MED GY IP 250 OP 250 PS 637: Performed by: STUDENT IN AN ORGANIZED HEALTH CARE EDUCATION/TRAINING PROGRAM

## 2023-08-29 PROCEDURE — 250N000011 HC RX IP 250 OP 636: Performed by: RADIOLOGY

## 2023-08-29 PROCEDURE — 36415 COLL VENOUS BLD VENIPUNCTURE: CPT | Performed by: STUDENT IN AN ORGANIZED HEALTH CARE EDUCATION/TRAINING PROGRAM

## 2023-08-29 RX ORDER — KETOROLAC TROMETHAMINE 30 MG/ML
30 INJECTION, SOLUTION INTRAMUSCULAR; INTRAVENOUS ONCE
Status: COMPLETED | OUTPATIENT
Start: 2023-08-29 | End: 2023-08-29

## 2023-08-29 RX ORDER — METHYLPHENIDATE HYDROCHLORIDE 30 MG/1
30 CAPSULE, EXTENDED RELEASE ORAL DAILY
COMMUNITY

## 2023-08-29 RX ORDER — IOPAMIDOL 755 MG/ML
145 INJECTION, SOLUTION INTRAVASCULAR ONCE
Status: COMPLETED | OUTPATIENT
Start: 2023-08-29 | End: 2023-08-29

## 2023-08-29 RX ORDER — LIDOCAINE 4 G/G
1 PATCH TOPICAL ONCE
Status: DISCONTINUED | OUTPATIENT
Start: 2023-08-29 | End: 2023-08-29 | Stop reason: HOSPADM

## 2023-08-29 RX ORDER — ONDANSETRON 2 MG/ML
4 INJECTION INTRAMUSCULAR; INTRAVENOUS EVERY 30 MIN PRN
Status: DISCONTINUED | OUTPATIENT
Start: 2023-08-29 | End: 2023-08-29 | Stop reason: HOSPADM

## 2023-08-29 RX ADMIN — IOPAMIDOL 145 ML: 755 INJECTION, SOLUTION INTRAVENOUS at 13:12

## 2023-08-29 RX ADMIN — LIDOCAINE 1 PATCH: 560 PATCH PERCUTANEOUS; TOPICAL; TRANSDERMAL at 12:11

## 2023-08-29 RX ADMIN — KETOROLAC TROMETHAMINE 30 MG: 30 INJECTION, SOLUTION INTRAMUSCULAR; INTRAVENOUS at 13:35

## 2023-08-29 ASSESSMENT — ACTIVITIES OF DAILY LIVING (ADL): ADLS_ACUITY_SCORE: 37

## 2023-08-29 NOTE — ED PROVIDER NOTES
History     Chief Complaint   Patient presents with    Flank Pain     HPI  Jose Alexander is a 27 year old male with the below past medical history presents to the emergency department today complaining of lower right back pain and groin pain.  He states exertion makes the pain worse in certain leaning makes the pain in the back worse, however today he started getting pain in the right lower quadrant which was new for him.  No associated nausea vomiting hematemesis melena or urinary symptoms.  No other complaints at this time.  He does have an appendix    Allergies:  No Known Allergies    Problem List:    Patient Active Problem List    Diagnosis Date Noted    PLACIDO (obstructive sleep apnea) 08/08/2023     Priority: Medium    Morbid obesity (H) 01/23/2023     Priority: Medium    Strep pharyngitis 12/31/2022     Priority: Medium    Frostbite of both feet 12/20/2022     Priority: Medium    Suicide attempt (H) 11/08/2021     Priority: Medium    Suicidal overdose, subsequent encounter 11/08/2021     Priority: Medium    Moderate episode of recurrent major depressive disorder (H) 09/14/2021     Priority: Medium    Major depressive disorder, recurrent severe without psychotic features (H) 09/14/2021     Priority: Medium    Anxiety disorder 06/04/2019     Priority: Medium    BPPV (benign paroxysmal positional vertigo) 04/10/2012     Priority: Medium     Formatting of this note might be different from the original.  IMO Update 10/11      Pes planovalgus 08/26/2010     Priority: Medium    Other acne 03/31/2010     Priority: Medium    Seborrheic dermatitis, unspecified 03/31/2010     Priority: Medium     Formatting of this note might be different from the original.  IMO Update 10/11      Bilateral knee pain 01/08/2009     Priority: Medium    Headache 11/11/2005     Priority: Medium     Formatting of this note might be different from the original.  Could be sinus allergies  IMO Update 10 2016          Past Medical History:     No past medical history on file.    Past Surgical History:    No past surgical history on file.    Family History:    No family history on file.    Social History:  Marital Status:  Single [1]  Social History     Tobacco Use    Smoking status: Every Day     Types: Vaping Device, Dip, chew, snus or snuff     Start date: 2/1/2013    Smokeless tobacco: Current     Types: Chew    Tobacco comments:     Denies quit plan 2/24/2023        Medications:    DULoxetine (CYMBALTA) 30 MG capsule  gabapentin (NEURONTIN) 800 MG tablet  guanFACINE (TENEX) 0.5 mg TABS half-tab  ibuprofen (ADVIL/MOTRIN) 400 MG tablet  methylphenidate (RITALIN LA) 30 MG 24 hr capsule          Review of Systems  See hpi  Physical Exam   BP: 129/89  Pulse: 93  Temp: 97.9  F (36.6  C)  Resp: 16  Weight: 134.4 kg (296 lb 3.2 oz)  SpO2: 97 %      Physical Exam  Constitutional: Alert and conversant. NAD   HENT: NCAT   Eyes: Normal pupils   Neck: supple   CV: Normal rate  Pulmonary/Chest: Non-labored respirations  Abdominal: Soft, RLQ-tender, non-distended, wincing during Cifuentes sign test but ultimately equivocal, no rebound no guarding, subtly positive sit of Rovsing sign  MSK: RIVERA.   Neuro: Alert and appropriate   Skin: Warm and dry. No diaphoresis. No rashes on exposed skin    Psych: Appropriate mood and affect     ED Course              ED Course as of 08/29/23 1354   Tue Aug 29, 2023   1205 Differential includes but is not limited to appendicitis, cholecystitis, pancreatitis, nephrolithiasis, gastroesophageal reflux disease, gastritis, pyelonephritis, urinary tract infection, testicular torsion, mesenteric ischemia, strangulated hernia, aortic aneurysm.   With right lower quadrant pain strongly considering appendicitis versus kidney stone.  No blood in the urine however making kidney stone less likely.  No evidence of UTI or pyelonephritis.  Discussed with patient STD testing.  He states he is not sexually active so no specific indication at this time.    1331 CT Abdomen Pelvis w Contrast  No intra-abdominal inflammatory change, fluid collection  or focal mass. No significant bowel abnormality     1331 Some improvement of symptoms after lidocaine patch.  Most likely MSK in etiology.  No concerning red flags.  Neurological function is normal.  Patient is appropriate for further outpatient management with symptomatic control.  Discharged in stable condition all question answered and return precautions given.     Procedures            Results for orders placed or performed during the hospital encounter of 08/29/23 (from the past 24 hour(s))   UA with Microscopic reflex to Culture    Specimen: Urine, Midstream   Result Value Ref Range    Color Urine Light Yellow Colorless, Straw, Light Yellow, Yellow    Appearance Urine Clear Clear    Glucose Urine Negative Negative mg/dL    Bilirubin Urine Negative Negative    Ketones Urine Negative Negative mg/dL    Specific Gravity Urine 1.019 1.003 - 1.035    Blood Urine Negative Negative    pH Urine 5.5 4.7 - 8.0    Protein Albumin Urine Negative Negative mg/dL    Urobilinogen Urine Normal Normal, 2.0 mg/dL    Nitrite Urine Negative Negative    Leukocyte Esterase Urine Negative Negative    Mucus Urine Present (A) None Seen /LPF    RBC Urine <1 <=2 /HPF    WBC Urine <1 <=5 /HPF    Squamous Epithelials Urine 0 <=1 /HPF    Narrative    Urine Culture not indicated   CBC with platelets differential    Narrative    The following orders were created for panel order CBC with platelets differential.  Procedure                               Abnormality         Status                     ---------                               -----------         ------                     CBC with platelets and d...[[id number]]                      Final result                 Please view results for these tests on the individual orders.   Comprehensive metabolic panel   Result Value Ref Range    Sodium 140 136 - 145 mmol/L    Potassium 4.5 3.4 - 5.3 mmol/L     Chloride 102 98 - 107 mmol/L    Carbon Dioxide (CO2) 28 22 - 29 mmol/L    Anion Gap 10 7 - 15 mmol/L    Urea Nitrogen 14.0 6.0 - 20.0 mg/dL    Creatinine 0.57 (L) 0.67 - 1.17 mg/dL    Calcium 9.8 8.6 - 10.0 mg/dL    Glucose 91 70 - 99 mg/dL    Alkaline Phosphatase 83 40 - 129 U/L    AST 37 0 - 45 U/L    ALT 59 0 - 70 U/L    Protein Total 7.5 6.4 - 8.3 g/dL    Albumin 4.8 3.5 - 5.2 g/dL    Bilirubin Total 0.6 <=1.2 mg/dL    GFR Estimate >90 >60 mL/min/1.73m2   [place] Draw    Narrative    The following orders were created for panel order [place] Draw.  Procedure                               Abnormality         Status                     ---------                               -----------         ------                     Extra Blue Top Tube[[id number]]                              Final result               Extra Red Top Tube[[id number]]                               Final result               Extra Heparinized Syringe[[id number]]                        Final result                 Please view results for these tests on the individual orders.   CBC with platelets and differential   Result Value Ref Range    WBC Count 5.5 4.0 - 11.0 10e3/uL    RBC Count 5.61 4.40 - 5.90 10e6/uL    Hemoglobin 16.2 13.3 - 17.7 g/dL    Hematocrit 48.0 40.0 - 53.0 %    MCV 86 78 - 100 fL    MCH 28.9 26.5 - 33.0 pg    MCHC 33.8 31.5 - 36.5 g/dL    RDW 12.7 10.0 - 15.0 %    Platelet Count 233 150 - 450 10e3/uL    % Neutrophils 45 %    % Lymphocytes 39 %    % Monocytes 11 %    % Eosinophils 3 %    % Basophils 1 %    % Immature Granulocytes 1 %    NRBCs per 100 WBC 0 <1 /100    Absolute Neutrophils 2.6 1.6 - 8.3 10e3/uL    Absolute Lymphocytes 2.2 0.8 - 5.3 10e3/uL    Absolute Monocytes 0.6 0.0 - 1.3 10e3/uL    Absolute Eosinophils 0.2 0.0 - 0.7 10e3/uL    Absolute Basophils 0.0 0.0 - 0.2 10e3/uL    Absolute Immature Granulocytes 0.0 <=0.4 10e3/uL    Absolute NRBCs 0.0 10e3/uL   Extra Blue Top Tube   Result Value Ref Range    Hold Specimen  JIC    Extra Red Top Tube   Result Value Ref Range    Hold Specimen JIC    Extra Heparinized Syringe   Result Value Ref Range    Hold Specimen OK    CT Abdomen Pelvis w Contrast    Narrative    PROCEDURE: CT ABDOMEN PELVIS W CONTRAST 8/29/2023 1:15 PM    HISTORY: Right lower quadrant pain    COMPARISONS: None.    Meds/Dose Given: Isovue 370 145ml Given    TECHNIQUE: Axial postcontrast enhanced images with coronal and  sagittal reformatted images.    FINDINGS: There is some mild dependent atelectasis.    No focal abnormality is seen in the liver, spleen, adrenal glands or  in the pancreas. Gallbladder is present.    There is no solid renal mass. There is no hydronephrosis.    No significant bowel abnormality is seen. There is no inflammatory  change or focal fluid collection. Appendix is not definitely  visualized.    There is no lymph node enlargement. There is no aneurysm. No bone  lesion is seen.         Impression    IMPRESSION: No intra-abdominal inflammatory change, fluid collection  or focal mass. No significant bowel abnormality.    KRISTA MCFADDEN MD         SYSTEM ID:  [id number]       Medications   ondansetron (ZOFRAN) injection 4 mg (4 mg Intravenous Not Given 8/29/23 1212)   Lidocaine (LIDOCARE) 4 % Patch 1 patch (1 patch Transdermal $Patch/Med Applied 8/29/23 1211)   iopamidol (ISOVUE-370) solution 145 mL (145 mLs Intravenous $Given 8/29/23 1312)   sodium chloride (PF) 0.9% PF flush 50 mL (50 mLs Intravenous $Given 8/29/23 1313)   ketorolac (TORADOL) injection 30 mg (30 mg Intravenous $Given 8/29/23 1335)       Assessments & Plan (with Medical Decision Making)     I have reviewed the nursing notes.    I have reviewed the findings, diagnosis, plan and need for follow up with the patient.        New Prescriptions    No medications on file       Final diagnoses:   Acute right-sided low back pain without sciatica       8/29/2023   [facility]       David Martini MD  08/29/23 0313

## 2023-08-29 NOTE — Clinical Note
Jose Alexander was seen and treated in our emergency department on 8/29/2023.  He may return to work on 08/30/2023.       If you have any questions or concerns, please don't hesitate to call.      David Martini MD

## 2023-08-29 NOTE — DISCHARGE INSTRUCTIONS
Return to the emergency department for worsening symptoms or new concerning symptoms.  Follow-up with your primary care provider within the next week.  You can use lidocaine patches, ibuprofen, Tylenol Epsom salt baths and other over-the-counter medications like Biofreeze.

## 2023-08-31 ENCOUNTER — OFFICE VISIT (OUTPATIENT)
Dept: PSYCHOLOGY | Facility: OTHER | Age: 28
End: 2023-08-31
Attending: SOCIAL WORKER
Payer: COMMERCIAL

## 2023-08-31 DIAGNOSIS — F33.2 SEVERE RECURRENT MAJOR DEPRESSION WITHOUT PSYCHOTIC FEATURES (H): Primary | ICD-10-CM

## 2023-08-31 PROCEDURE — 90853 GROUP PSYCHOTHERAPY: CPT

## 2023-08-31 NOTE — CONFIDENTIAL NOTE
[facility]  TRACK: [facility]      PATIENT'S NAME:    Jose Alexander  MRN:                           [id number]  [facility]                           1995  [id number]  DATE OF SERVICE:  23  START TIME: 3:00 PM  END TIME: 4:30 PM  Service modality:  In-person     Diagnoses: 296.33 (F33.2) Major Depressive Disorder, Recurrent Episode, Severe _ and With anxious distress;  Generalized Anxiety Disorder          Data:     Session content: At the start of this group, patients were invited to check in by identifying themselves, describing their current emotional status, and identifying issues to address in this group. Area(s) of treatment focus addressed in this session included Symptom Management. We reviewed the week as well as skills that were used and skills that could have been used. After check in, we reviewed skills that were used this week as well as skills that could have been used this past week. We reviewed and reflected on Emotional Wellness and Coping Thoughts. Jose shares and discusses his strengths and weaknesses regarding his emotional wellness. He states he does well with being mindful and practices this everyday. He likes to do breathing and 5 to 1 exercises. He expresses how he would like to work on getting better quality sleep and reduce his stress. He reveals he can work on this by exercising regularly and setting limits on electronics.      Therapeutic Interventions/Treatment Strategies:  Psychotherapist offered support, feedback and validation, provided redirection, and reinforced use of skills. Treatment modalities used include Motivational Interviewing and Dialectical Behavioral Therapy. Interventions include Mindfulness: Facilitated discussion of when/how to use mindfulness skills to benefit general health, mental health symptoms, and stressors and Encouraged a plan to use mindfulness skills in daily life, Symptoms Management: Promoted understanding of  their diagnoses and how it impacts their functioning, Emotions Management:  Discussed barriers to emotional regulation, Reviewed opposite action skill, and Increased awareness of daily mood patterns/changes, and Relationship Skills: Assisted patients in implementing more effective communication skills in their relationships, Encouraged development and maintenance  of healthy boundaries, and Discussed relationships and ways to reduce conflict . After check in, we reviewed skills that were used this week as well as skills that could have been used this past week.      Assessment:     Patient response:   Patient responded to session by accepting feedback, giving feedback, listening, focusing on goals, being attentive, accepting support, appearing alert, and verbalizing understanding. Jose shares how he spent Tuesday in the ER due to his back hurting. He does state his frustration with his job regarding lack of coverage for the days he needed off. He was able to cope and use his skills to help manage his stress. He found a positive in the situation as getting a chance to relax and re cooperate while his back healed.   Jose is receptive to feedback regarding his emotional wellness and is able to identify areas of progress and struggle within his life regarding his mental wellness. Jose participated well and was able to fully engage with the group. He is intentional in working towards his goals and practice his skills.        Possible barriers to participation / learning include: and no barriers identified     Health Issues:              None reported                  Substance Use Review:              Substance Use: No active concerns identified.     Mental Status/Behavioral Observations  Appearance:                            Appropriate   Eye Contact:                           Good   Psychomotor Behavior:          Normal   Attitude:                                   Cooperative   Orientation:                              All  Speech              Rate / Production:       Normal               Volume:                       Normal   Mood:                                      Normal  Affect:                                      Appropriate   Thought Content:                    Clear  Thought Form:                        Coherent  Logical     Insight:                                     Good      Plan:   Safety Plan: No current safety concerns identified.  Recommended that patient call 911 or go to the local ED should there be a change in any of these risk factors.   Barriers to treatment: None identified  Patient Contracts (see media tab):  None  Substance Use: Not addressed in session   Continue or Discharge: Patient will continue in [facility] group as planned. Patient is likely to benefit from learning and using skills as they work toward the goals identified in their treatment plan.        SHANNA JEAN BAPTISTE              August 31, 2023

## 2023-09-07 ENCOUNTER — OFFICE VISIT (OUTPATIENT)
Dept: PSYCHOLOGY | Facility: OTHER | Age: 28
End: 2023-09-07
Attending: SOCIAL WORKER
Payer: COMMERCIAL

## 2023-09-07 DIAGNOSIS — F33.2 SEVERE RECURRENT MAJOR DEPRESSION WITHOUT PSYCHOTIC FEATURES (H): Primary | ICD-10-CM

## 2023-09-07 PROCEDURE — 90853 GROUP PSYCHOTHERAPY: CPT

## 2023-09-14 ENCOUNTER — OFFICE VISIT (OUTPATIENT)
Dept: PSYCHOLOGY | Facility: OTHER | Age: 28
End: 2023-09-14
Attending: SOCIAL WORKER
Payer: COMMERCIAL

## 2023-09-14 DIAGNOSIS — F33.2 SEVERE RECURRENT MAJOR DEPRESSION WITHOUT PSYCHOTIC FEATURES (H): Primary | ICD-10-CM

## 2023-09-14 PROCEDURE — 90853 GROUP PSYCHOTHERAPY: CPT

## 2023-09-14 NOTE — CONFIDENTIAL NOTE
[facility]  TRACK: [facility]     PATIENT'S NAME: Jose Alexander  MRN:   [id number]  :   1995  ACCT. NUMBER: [id number]  DATE OF SERVICE: 23  START TIME: 3:00 PM  END TIME: 4:30 PM   Service modality:  In-person    Diagnoses: 296.33 (F33.2) Major Depressive Disorder, Recurrent Episode, Severe _ and With anxious distress;  Generalized Anxiety Disorder         Data:     Session content: At the start of this group, patients were invited to check in by identifying themselves, describing their current emotional status, and identifying issues to address in this group. Area(s) of treatment focus addressed in this session included Symptom Management. We reviewed the week as well as skills that were used and skills that could have been used. After check in, we reviewed skills that were used this week as well as skills that could have been used this past week. We reviewed and reflected on perception of self and the perception we give off to others. Jose shares people see him as hard working, spiritual, and a good friend. While he views himself as confused 90 percent of the time and brutally honest. He does dig deeper into this stating he believes that being brutally honest may be a form of judging instead. Reminded him of common humanity and that we all do this as humans. 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 He expresses how he would like to continue his [employer] journey and traveling would creat this for him.       Therapeutic Interventions/Treatment Strategies:  Psychotherapist offered support, feedback and validation, provided redirection, and reinforced use of skills. Treatment modalities used include Motivational Interviewing and Dialectical Behavioral Therapy. Interventions include Mindfulness: Facilitated discussion of when/how to use mindfulness skills to benefit  general health, mental health symptoms, and stressors and Encouraged a plan to use mindfulness skills in daily life, Symptoms Management: Promoted understanding of their diagnoses and how it impacts their functioning, Emotions Management:  Discussed barriers to emotional regulation, Reviewed opposite action skill, and Increased awareness of daily mood patterns/changes, and Relationship Skills: Assisted patients in implementing more effective communication skills in their relationships, Encouraged development and maintenance  of healthy boundaries, and Discussed relationships and ways to reduce conflict . After check in, we reviewed skills that were used this week as well as skills that could have been used this past week.      Assessment:     Patient response:   Patient responded to session by accepting feedback, giving feedback, listening, focusing on goals, being attentive, accepting support, appearing alert, and verbalizing understanding. Jose shares his week was nothing new but plans to take a week off of mental health services next week. He looks forward to this and plans to do lots of reading and writing. Jose is receptive to feedback regarding his emotional wellness and is able to identify areas of progress and struggle within his life regarding his mental wellness. Jose participated well and was able to fully engage with the group. He is intentional in working towards his goals and practice his skills.        Possible barriers to participation / learning include: and no barriers identified     Health Issues:              None reported                  Substance Use Review:              Substance Use: No active concerns identified.     Mental Status/Behavioral Observations  Appearance:                            Appropriate   Eye Contact:                           Good   Psychomotor Behavior:          Normal   Attitude:                                   Cooperative   Orientation:                              All  Speech              Rate / Production:       Normal               Volume:                       Normal   Mood:                                      Normal  Affect:                                      Appropriate   Thought Content:                    Clear  Thought Form:                        Coherent  Logical     Insight:                                     Good      Plan:   Safety Plan: No current safety concerns identified.  Recommended that patient call 911 or go to the local ED should there be a change in any of these risk factors.   Barriers to treatment: None identified  Patient Contracts (see media tab):  None  Substance Use: Not addressed in session   Continue or Discharge: Patient will continue in [facility] group as planned. Patient is likely to benefit from learning and using skills as they work toward the goals identified in their treatment plan.      SHANNA JEAN BAPTISTE  September 14, 2023

## 2023-10-05 ENCOUNTER — OFFICE VISIT (OUTPATIENT)
Dept: PSYCHOLOGY | Facility: OTHER | Age: 28
End: 2023-10-05
Attending: SOCIAL WORKER
Payer: COMMERCIAL

## 2023-10-05 DIAGNOSIS — F33.2 SEVERE RECURRENT MAJOR DEPRESSION WITHOUT PSYCHOTIC FEATURES (H): Primary | ICD-10-CM

## 2023-10-05 PROCEDURE — 90853 GROUP PSYCHOTHERAPY: CPT

## 2023-10-05 NOTE — CONFIDENTIAL NOTE
[facility]  TRACK: [facility]     PATIENT'S NAME: Jose Alexander  MRN:   [id number]  :   1995  ACCT. NUMBER: [id number]  DATE OF SERVICE: 10/05/23  START TIME: 3:00 PM  END TIME: 4:30 PM  Service modality:  In-person    Diagnoses: 296.33 (F33.2) Major Depressive Disorder, Recurrent Episode, Severe _ and With anxious distress;  Generalized Anxiety Disorder         Data:     Session content: At the start of this group, patients were invited to check in by identifying themselves, describing their current emotional status, and identifying issues to address in this group. Area(s) of treatment focus addressed in this session included Symptom Management. We reviewed the week as well as skills that were used and skills that could have been used. After check in, we reviewed skills that were used this week as well as skills that could have been used this past week. We reviewed and reflected on the path to self care. We discussed forms of psychical and mental self care. Jose identifies working on exercise and time management. He states he would like to start working on exploring hobbies and developing a regular sleeping pattern. When speaking about things to start and stop doing, Jose shares he would like to stop killing his own vibe, and start illuminating his optimism. Lastly we spoke about ways to start saying no without feeling guilty. Jose is receptive and shares his feed back with the group.          Therapeutic Interventions/Treatment Strategies:  Psychotherapist offered support, feedback and validation, provided redirection, and reinforced use of skills. Treatment modalities used include Motivational Interviewing and Dialectical Behavioral Therapy. Interventions include Mindfulness: Facilitated discussion of when/how to use mindfulness skills to benefit general health, mental health symptoms, and stressors and Encouraged a plan to use mindfulness skills in daily life, Symptoms  Management: Promoted understanding of their diagnoses and how it impacts their functioning, Emotions Management:  Discussed barriers to emotional regulation, Reviewed opposite action skill, and Increased awareness of daily mood patterns/changes, and Relationship Skills: Assisted patients in implementing more effective communication skills in their relationships, Encouraged development and maintenance  of healthy boundaries, and Discussed relationships and ways to reduce conflict . After check in, we reviewed skills that were used this week as well as skills that could have been used this past week.      Assessment:     Patient response:   Patient responded to session by accepting feedback, giving feedback, listening, focusing on goals, being attentive, accepting support, appearing alert, and verbalizing understanding. Jose shares his week was nothing new but did lose his [employer] connection due to lack of funding. 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 Jose is receptive to feedback regarding his emotional wellness and is able to identify areas of progress and struggle within his life regarding his mental wellness. Jose participated well and was able to fully engage with the group. He is intentional in working towards his goals and practice his skills.        Possible barriers to participation / learning include: and no barriers identified     Health Issues:              None reported                  Substance Use Review:              Substance Use: No active concerns identified.     Mental Status/Behavioral Observations  Appearance:                            Appropriate   Eye Contact:                           Good   Psychomotor Behavior:          Normal   Attitude:                                   Cooperative   Orientation:                             All  Speech              Rate / Production:        Normal               Volume:                       Normal   Mood:                                      Normal  Affect:                                      Appropriate   Thought Content:                    Clear  Thought Form:                        Coherent  Logical     Insight:                                     Good      Plan:   Safety Plan: No current safety concerns identified.  Recommended that patient call 911 or go to the local ED should there be a change in any of these risk factors.   Barriers to treatment: None identified  Patient Contracts (see media tab):  None  Substance Use: Not addressed in session   Continue or Discharge: Patient will continue in [facility] group as planned. Patient is likely to benefit from learning and using skills as they work toward the goals identified in their treatment plan.      SHANNA JEAN BAPTISTE  October 5, 2023

## 2023-10-12 ENCOUNTER — OFFICE VISIT (OUTPATIENT)
Dept: PSYCHOLOGY | Facility: OTHER | Age: 28
End: 2023-10-12
Attending: SOCIAL WORKER
Payer: COMMERCIAL

## 2023-10-12 DIAGNOSIS — F33.2 SEVERE RECURRENT MAJOR DEPRESSION WITHOUT PSYCHOTIC FEATURES (H): Primary | ICD-10-CM

## 2023-10-12 PROCEDURE — 90853 GROUP PSYCHOTHERAPY: CPT

## 2023-10-12 NOTE — CONFIDENTIAL NOTE
[facility]  TRACK: [facility] wellness     PATIENT'S NAME: Jose Alexander  MRN:   [id number]  :   1995  ACCT. NUMBER: [id number]  DATE OF SERVICE: 10/12/23  START TIME: 3:00 PM  END TIME: 4:30 PM  Service modality:  In-person    Diagnoses: 296.33 (F33.2) Major Depressive Disorder, Recurrent Episode, Severe _ and With anxious distress;  Generalized Anxiety Disorder         Data:     Session content: At the start of this group, patients were invited to check in by identifying themselves, describing their current emotional status, and identifying issues to address in this group. Area(s) of treatment focus addressed in this session included Symptom Management. We reviewed the week as well as skills that were used and skills that could have been used. After check in, we reviewed skills that were used this week as well as skills that could have been used this past week. We reviewed and reflected on assertiveness. Regarding what assertiveness is, Myths about it, the effects of being unassertive, and what stops us from being assertive. Jose shares his insight regarding his own experiences on assertiveness and how he dealt with it in the past. He discusses how he can easily give criticism to friends of the same gender but struggles with saying no to authority figures. Jose is receptive to feedback with in the group and able to be participatory sharing his knowledge with others.         Therapeutic Interventions/Treatment Strategies:  Psychotherapist offered support, feedback and validation, provided redirection, and reinforced use of skills. Treatment modalities used include Motivational Interviewing and Dialectical Behavioral Therapy. Interventions include Mindfulness: Facilitated discussion of when/how to use mindfulness skills to benefit general health, mental health symptoms, and stressors and Encouraged a plan to use mindfulness skills in daily life, Symptoms Management: Promoted understanding of  their diagnoses and how it impacts their functioning, Emotions Management:  Discussed barriers to emotional regulation, Reviewed opposite action skill, and Increased awareness of daily mood patterns/changes, and Relationship Skills: Assisted patients in implementing more effective communication skills in their relationships, Encouraged development and maintenance  of healthy boundaries, and Discussed relationships and ways to reduce conflict . After check in, we reviewed skills that were used this week as well as skills that could have been used this past week.      Assessment:     Patient response:   Patient responded to session by accepting feedback, giving feedback, listening, focusing on goals, being attentive, accepting support, appearing alert, and verbalizing understanding. Jose shares his week was stressful and exhausting as he is struggling with falling back into a spiral with his mental health. He shares he has been doing more self care to help manage this and continues to work on his skills 3 C's, Present Moment, and Gratitudes. Jose is receptive to feedback regarding his emotional wellness and is able to identify areas of progress and struggle within his life regarding his mental wellness. Jose participated well and was able to fully engage with the group. He is intentional in working towards his goals and practice his skills.        Possible barriers to participation / learning include: and no barriers identified     Health Issues:              None reported                  Substance Use Review:              Substance Use: No active concerns identified.     Mental Status/Behavioral Observations  Appearance:                            Appropriate   Eye Contact:                           Good   Psychomotor Behavior:          Normal   Attitude:                                   Cooperative   Orientation:                             All  Speech              Rate / Production:       Normal                Volume:                       Normal   Mood:                                      Normal  Affect:                                      Appropriate   Thought Content:                    Clear  Thought Form:                        Coherent  Logical     Insight:                                     Good      Plan:   Safety Plan: No current safety concerns identified.  Recommended that patient call 911 or go to the local ED should there be a change in any of these risk factors.   Barriers to treatment: None identified  Patient Contracts (see media tab):  None  Substance Use: Not addressed in session   Continue or Discharge: Patient will continue in [facility] group as planned. Patient is likely to benefit from learning and using skills as they work toward the goals identified in their treatment plan      SHANNA JEAN BAPTISTE  October 12, 2023

## 2023-10-19 ENCOUNTER — OFFICE VISIT (OUTPATIENT)
Dept: PSYCHOLOGY | Facility: OTHER | Age: 28
End: 2023-10-19
Attending: SOCIAL WORKER
Payer: COMMERCIAL

## 2023-10-19 DIAGNOSIS — F33.2 SEVERE RECURRENT MAJOR DEPRESSION WITHOUT PSYCHOTIC FEATURES (H): Primary | ICD-10-CM

## 2023-10-19 PROCEDURE — 90853 GROUP PSYCHOTHERAPY: CPT

## 2023-10-19 NOTE — CONFIDENTIAL NOTE
Individualized Treatment Plan  Step-down/Wellness group            Date of Plan: 2023     Name: Jose Alexander                                        MRN: [id number]     : 1995     Programs:  [facility]     Diagnosis: 296.33 (F33.2) Major Depressive Disorder, Recurrent Episode, Severe _ and With anxious distress;  Generalized Anxiety Disorder    DA Date: 23     Client Strengths:  caring, creative, educated, empathetic, employed, goal-focused, good listener, has a previous history of therapy, insightful, intelligent, motivated, open to learning, open to suggestions / feedback, support of family, friends and providers, supportive, wants to learn, willing to ask questions, willing to relate to others and work history     Long-Term Goals:  Knowledge about illness and management of symptoms   Maintenance of personal safety      Discharge Criteria:  Satisfactory progress toward treatment goals   Improvement re: identified problems and symptoms   Ability to continue recovery at next level of service   Has a discharge plan in place   Regular attendance as scheduled         Area of Treatment Focus:  Progress   Personal Safety, Symptom Stabilization and Management and Community Resources / Support and Discharge Planning        Team Members Contributing to Plan:  Nelida Thomas, [facility]  Cristina Francis, [facility]  Yani Cervantes [facility]     Client Strengths:  caring, creative, educated, empathetic, employed, goal-focused, good listener, has a previous history of therapy, insightful, intelligent, motivated, open to learning, open to suggestions / feedback, support of family, friends and providers, supportive, wants to learn, willing to ask questions, willing to relate to others and work history     Client Participation in Plan:  Agrees with plan   Received copy of treatment plan      Areas of Vulnerability:  Poor impulse control   Anxiety  Depressive symptoms   Physical/medical: feet       Long-Term Goals:  Knowledge about illness and management of symptoms   Maintenance of personal safety   Effective management of impulsivity      Abuse Prevention Plan:  Safe, therapeutic environment   Safety coping plan as needed   Education regarding illness and skill development   Coordination with care providers   Impluse control education and intervention      Discharge Criteria:  Satisfactory progress toward treatment goals   Improvement re: identified problems and symptoms   Ability to continue recovery at next level of service   Has a discharge plan in place   Has safety/coping plan in place   Regular attendance as scheduled               Areas of Treatment Focus                Area of Treatment Focus:   Symptom Stabilization and Management  Start Date:    7/6/23    Goal:                                                                          Target Date: 9/7/23                                          Status: Active  Will improve Lifestyle Balance / Reduce overwork, being over responsible, or decreased self care.  Increase attention to and priority of  own needs, preferences, and values by scheduling time to do something you want to do for yourself.       Progress:  Jose does a great job in the groups.  He has been actively engaged in the program and is always willing to participate fully in the groups.  He has been able to recognize areas where he needs to improve his work/home life balance and has been consistent in discussing skills he has been able to use to maintain in a healthy manner with his MH symptoms.  Jose would benefit from the weekly support of the group as he continues on his path of wellness.                         Treatment Strategies:   Assist clients in establishing / strengthening support network  Engage in safety planning when indicated  Facilitate increased self awareness  Teach adaptive coping skills and communication skills     These services will include group therapy and  OT group therapy daily and individual therapy and medications management as needed.                                 Area of Treatment Focus:   Symptom Stabilization and Management  Start Date:    7/6/23    Goal:                                                                          Target Date: 9/7/23                                          Status: Active     Will increase use of adaptive life skills by working with the group on a variety of life areas.  Will use the group time to increase awareness and skills necessary continue on a path of wellness.       Progress:  Again, Jose has been actively engaged in the group programming.  He provides insight in the group discussions and is open to new skill discussion as well as making active and intentional plans to use the skills.  Jose would benefit from continued support from the group and the review of wellness topics.             Treatment Strategies:   Assist clients in establishing / strengthening support network  Engage in safety planning when indicated  Facilitate increased self awareness  Teach adaptive coping skills and communication skills     These services will include group therapy and OT group therapy daily and individual therapy and medications management as needed.

## 2023-10-24 NOTE — CONFIDENTIAL NOTE
[facility]  TRACK: [facility]    PATIENT'S NAME: Jose Alexander  MRN:   [id number]  :   1995  ACCT. NUMBER: [id number]  DATE OF SERVICE: 10/19/23  START TIME: 3:00  END TIME: 4:30  Service modality:  In-person    Diagnoses:  296.33 (F33.2) Major Depressive Disorder, Recurrent Episode, Severe _ and With anxious distress;  Generalized Anxiety Disorder     Data:    Session content: At the start of this group, patients were invited to check in by identifying themselves, describing their current emotional status, and identifying issues to address in this group.   Area(s) of treatment focus addressed in this session included Symptom Management.  We reviewed the week as well as skills that were used and skills that could have been used.   After check in, we reviewed skills that were used this week as well as skills that could have been used this past week.  Continued the discussion on interpersonal effectiveness and focused on how to recognize assertive behavior.  Discussed definition of passive communication as well as verbal characteristics and non-verbal characteristics and thinking styles.  We also discussed the payoffs and the price of using this form of communication.  Then moved on to Aggressive communication/behavior and again looked at definition, verbal characteristics, non-verbal characteristics, thinking styles, payoffs and price.  We ended with discussing Assertive communication/behavior covering the same areas.  We finished the group encouraging them to be aware of their communication styles this week so they can identify characteristics with the understanding that we all use all three styles as some point in time - but being more aware helps us to focus on healthy communication.     Therapeutic Interventions/Treatment Strategies:  Psychotherapist offered support, feedback and validation and reinforced use of skills. Treatment modalities used include Dialectical Behavioral  Therapy. Interventions include Emotions Management:  Discussed barriers to emotional regulation and Increased awareness of daily mood patterns/changes.    Assessment:    Patient response:   Patient responded to session by accepting feedback, giving feedback, listening, focusing on goals, and being attentive.  Jose engaged very well as he discussed trying to find balance in his life right now - struggling to not feel like he is rocking the boat at work vs just dealing with it until he can move and struggling between silence and speaking up.  Also struggling with feeling like he has too much for MH services right now - reports feeling like he has to just focus on all his appointments a couple days a week and is not able to even get good sleep.  The group helped him talk through building his confidence that he can manage on his own with less support - he agreed to look at pros and cons of changing the expectations and which of the things that he does helps him the most right now.  Reviewed skills he has been using - he reports the same - present moment, mindfulness, self care, the GAP as well as common humanity.    Related to the topic today he was able to identify some verbal characteristics he does when being passive including qualifiers and self dismissive. He also endorsed all the thinking styles including my feelings, needs and thoughts are less important than yours.  Moved on to discussion the aggressive communication - he identified tone being sarcastic and harsh, staring at the other person, with the payoff being feeling less vulnerable.  Finally, when discussing assertive communication, he identified distinction between fact and opinion as well as receptive listening and was able to point out some positive qualities in a peer.  Good group for Jose was he discussed valid concerns about his current schedule and used the group well to process through options instead of feeling overwhelmed.      Possible  barriers to participation / learning include: and no barriers identified    Health Issues:   None reported       Substance Use Review:   Substance Use: No active concerns identified.    Mental Status/Behavioral Observations  Appearance:   Appropriate   Eye Contact:   Good   Psychomotor Behavior: Normal   Attitude:   Cooperative   Orientation:   All  Speech   Rate / Production: Normal    Volume:  Normal   Mood:    Normal  Affect:    Appropriate   Thought Content:   Clear  Thought Form:  Coherent  Logical     Insight:    Good     Plan:   Safety Plan: No current safety concerns identified.  Recommended that patient call 911 or go to the local ED should there be a change in any of these risk factors.   Barriers to treatment: None identified  Patient Contracts (see media tab):  None  Substance Use: Not addressed in session   Continue or Discharge: Patient will continue in the insightful wellness group as planned. Patient is likely to benefit from learning and using skills as they work toward the goals identified in their treatment plan.      Jolanta Thomas, [facility]  October 23, 2023

## 2023-10-25 NOTE — PROGRESS NOTES
Individualized Treatment Plan  Step-down/Wellness group            Date of Plan: 2023     Name: Jose Alexander                                        MRN: [id number]     : 1995     Programs:  [facility]     Diagnosis: 296.33 (F33.2) Major Depressive Disorder, Recurrent Episode, Severe _ and With anxious distress;  Generalized Anxiety Disorder    DA Date: 23     Client Strengths:  caring, creative, educated, empathetic, employed, goal-focused, good listener, has a previous history of therapy, insightful, intelligent, motivated, open to learning, open to suggestions / feedback, support of family, friends and providers, supportive, wants to learn, willing to ask questions, willing to relate to others and work history     Long-Term Goals:  Knowledge about illness and management of symptoms   Maintenance of personal safety      Discharge Criteria:  Satisfactory progress toward treatment goals   Improvement re: identified problems and symptoms   Ability to continue recovery at next level of service   Has a discharge plan in place   Regular attendance as scheduled         Area of Treatment Focus:  Progress   Personal Safety, Symptom Stabilization and Management and Community Resources / Support and Discharge Planning        Team Members Contributing to Plan:  Nelida Thomas, [facility]  Cristina Francis, [facility]  Yani Cervantes [facility]     Client Strengths:  caring, creative, educated, empathetic, employed, goal-focused, good listener, has a previous history of therapy, insightful, intelligent, motivated, open to learning, open to suggestions / feedback, support of family, friends and providers, supportive, wants to learn, willing to ask questions, willing to relate to others and work history     Client Participation in Plan:  Agrees with plan   Received copy of treatment plan      Areas of Vulnerability:  Poor impulse control   Anxiety  Depressive symptoms   Physical/medical: feet       Long-Term Goals:  Knowledge about illness and management of symptoms   Maintenance of personal safety   Effective management of impulsivity      Abuse Prevention Plan:  Safe, therapeutic environment   Safety coping plan as needed   Education regarding illness and skill development   Coordination with care providers   Impluse control education and intervention      Discharge Criteria:  Satisfactory progress toward treatment goals   Improvement re: identified problems and symptoms   Ability to continue recovery at next level of service   Has a discharge plan in place   Has safety/coping plan in place   Regular attendance as scheduled               Areas of Treatment Focus                Area of Treatment Focus:   Symptom Stabilization and Management  Start Date:    7/6/23    Goal:                                                                          Target Date: 9/7/23                                          Status: Active  Will improve Lifestyle Balance / Reduce overwork, being over responsible, or decreased self care.  Increase attention to and priority of  own needs, preferences, and values by scheduling time to do something you want to do for yourself.       Progress:   Jose has done a great job discussing these concerns each week.  He has been mindful of the work/life balance and times when he can't avoid this.  He has been open to practicing present moment as well as the thisness and the thatness.  He has worked hard to prioritize self care.  Jose has engaged well in the groups and is an active participant in the groups he attends.       Treatment Strategies:   Assist clients in establishing / strengthening support network  Engage in safety planning when indicated  Facilitate increased self awareness  Teach adaptive coping skills and communication skills     These services will include group therapy and OT group therapy daily and individual therapy and medications management as needed.                                  Area of Treatment Focus:   Symptom Stabilization and Management  Start Date:    7/6/23    Goal:                                                                          Target Date: 9/7/23                                          Status: Active     Will increase use of adaptive life skills by working with the group on a variety of life areas.  Will use the group time to increase awareness and skills necessary continue on a path of wellness.       Progress:  Jose has been a very active group member and has consistently contributed to the group topics.  He did note this past week that he is becoming overwhelmed at times with the amount of MH appointments he has and also struggles with confidence that he can manage without all the appointments.  He was encouraged to only attend the things he feels he needs to help him maintain as he has done very well in all the MH programming he has done through our program.  He was encouraged to reduce the number of group or individual sessions or both for the next quarter knowing he can go back to the increased level at any time.        Treatment Strategies:   Assist clients in establishing / strengthening support network  Engage in safety planning when indicated  Facilitate increased self awareness  Teach adaptive coping skills and communication skills     These services will include group therapy and OT group therapy daily and individual therapy and medications management as needed.             Neuro

## 2023-10-25 NOTE — PROGRESS NOTES
Individualized Treatment Plan     Date of Plan: 2023    Name: Jose Alexander MRN: [id number]    : 1995    Programs:  [facility]    Diagnosis: 296.33 (F33.2) Major Depressive Disorder, Recurrent Episode, Severe _ and With anxious distress;  Generalized Anxiety Disorder    DA Date: 23     Client Strengths:  caring, creative, educated, empathetic, employed, goal-focused, good listener, has a previous history of therapy, insightful, intelligent, motivated, open to learning, open to suggestions / feedback, support of family, friends and providers, supportive, wants to learn, willing to ask questions, willing to relate to others and work history     Long-Term Goals:  Knowledge about illness and management of symptoms   Maintenance of personal safety      Discharge Criteria:  Satisfactory progress toward treatment goals   Improvement re: identified problems and symptoms   Ability to continue recovery at next level of service   Has a discharge plan in place   Regular attendance as scheduled      Area of Treatment Focus:  Progress   Personal Safety, Symptom Stabilization and Management and Community Resources / Support and Discharge Planning        Team Members Contributing to Plan:  Nelida Thomas, [facility]  Cristina Francis, [facility]  Yani Cervantes, [facility]     Client Strengths:  caring, creative, educated, empathetic, employed, goal-focused, good listener, has a previous history of therapy, insightful, intelligent, motivated, open to learning, open to suggestions / feedback, support of family, friends and providers, supportive, wants to learn, willing to ask questions, willing to relate to others and work history     Client Participation in Plan:  Agrees with plan   Received copy of treatment plan      Areas of Vulnerability:  Poor impulse control   Anxiety  Depressive symptoms   Physical/medical: feet      Long-Term Goals:  Knowledge about illness and management of symptoms    Maintenance of personal safety   Effective management of impulsivity      Abuse Prevention Plan:  Safe, therapeutic environment   Safety coping plan as needed   Education regarding illness and skill development   Coordination with care providers   Impluse control education and intervention      Discharge Criteria:  Satisfactory progress toward treatment goals   Improvement re: identified problems and symptoms   Ability to continue recovery at next level of service   Has a discharge plan in place   Has safety/coping plan in place   Regular attendance as scheduled             Areas of Treatment Focus            Area of Treatment Focus:   Symptom Stabilization and Management  Start Date:    11.2.23    Goal:  Target Date: 2.9.24 Status: Active  Will report on symptoms and identify skills to use to maintain MH symptoms.       Progress:         Treatment Strategies:   Engage in safety planning when indicated  Facilitate increased self awareness  Teach adaptive coping skills and communication skills    These services will include weekly group therapy.                Area of Treatment Focus:   Symptom Stabilization and Management  Start Date:    11.2.23    Goal:  Target Date: 2.9.24 Status: Active    Will develop and practice interpersonal relationship skills such as the monthly skills reviewed in the group.      Progress:         Treatment Strategies:   Engage in safety planning when indicated  Facilitate increased self awareness  Teach adaptive coping skills and communication skills    These services will include weekly group therapy.

## 2023-10-26 ENCOUNTER — OFFICE VISIT (OUTPATIENT)
Dept: PSYCHOLOGY | Facility: OTHER | Age: 28
End: 2023-10-26
Attending: SOCIAL WORKER
Payer: COMMERCIAL

## 2023-10-26 DIAGNOSIS — F33.2 SEVERE RECURRENT MAJOR DEPRESSION WITHOUT PSYCHOTIC FEATURES (H): Primary | ICD-10-CM

## 2023-10-26 PROCEDURE — 90853 GROUP PSYCHOTHERAPY: CPT

## 2023-11-02 NOTE — CONFIDENTIAL NOTE
[facility]  TRACK: [facility]    PATIENT'S NAME: Jose Alexander  MRN:   [id number]  :   1995  ACCT. NUMBER: [id number]  DATE OF SERVICE: 10/26/23  START TIME: 3:00pm  END TIME: 4:30pm  Service modality:  In-person    Diagnoses:296.33 (F33.2) Major Depressive Disorder, Recurrent Episode, Severe _ and With anxious distress;  Generalized Anxiety Disorder       Data:    Session content: At the start of this group, patients were invited to check in by identifying themselves, describing their current emotional status, and identifying issues to address in this group.   Area(s) of treatment focus addressed in this session included  Symptom Management.  We reviewed the week as well as skills that were used and skills that could have been used.   After check in, we reviewed skills that were used this week as well as skills that could have been used this past week.  Continued the discussion on interpersonal effectiveness and focused on giving and receiving compliments and identifying unhelpful thoughts which stops from being able to accept or give compliments.  Discussed when/why it may feel uncomfortable to accept compliments and unassertive and assertive responses to receive compliments.         Therapeutic Interventions/Treatment Strategies:  Psychotherapist offered support, feedback and validation and reinforced use of skills. Treatment modalities used include Dialectical Behavioral Therapy. Interventions include Emotions Management: Discussed barriers to emotional regulation and Increased awareness of daily mood patterns/changes.       Assessment:    Patient response:   Patient responded to session by accepting feedback, giving feedback, listening, focusing on goals, being attentive, accepting support, and verbalizing understanding.  Jose shares that he has been using skills this last week including 3C's regarding work stressors, managing boundaries at work, present moment, listening to  "music and making efforts for \"me time\" where he is following through with self care.  Jose is engaged and focused during this group, he identifies that he has historically struggled with accepting compliments and has the thought that the person complimenting him is \"lying\" and \"just trying to make him feel better about himself.\"  He related to the unassertive ways of responding to a compliment as dismissing/deflecting the compliment, self criticism, being sarcastic, disagreeing and ignoring the complement. He states that his \"go to\" for anything that makes him feel uncomfortable is sarcasm and states that he wants to improve in his awareness of this habit; he adds that he has caught himself feeling worse after being complimented as well.  Jose endorses unhelpful thoughts with being compliments including: they don't really mean it, they are being \"smarmy\", \"I am big headed if I accept the compliment.  He shares that giving compliments is also sometimes challenging for him because he worries that the person he is compliments thinks he's being \"fake\" or insincere.  Jose shares that he also is not comfortable giving a female a compliment because he has had past responses from females that he is \"hitting on them\" and he presents concerns regarding making the female uncomfortable and/or the female misinterpreting what he is saying. We ended the group discussing responding to compliments assertively-or basically accepting the compliment rather than arguing or disregarding it.  This was a good group for Jose, he was very engaged and participatory, he does not present any safety concerns for this week.     Possible barriers to participation / learning include: and no barriers identified     Health Issues:              None reported                  Substance Use Review:              Substance Use: No active concerns identified.     Mental Status/Behavioral Observations  Appearance:                            " Appropriate   Eye Contact:                           Good   Psychomotor Behavior:          Normal   Attitude:                                   Cooperative   Orientation:                             All  Speech              Rate / Production:       Normal               Volume:                       Normal   Mood:                                      Normal  Affect:                                      Appropriate   Thought Content:                    Clear  Thought Form:                        Coherent  Logical     Insight:                                     Good      Plan:   Safety Plan: No current safety concerns identified.  Recommended that patient call 911 or go to the local ED should there be a change in any of these risk factors.   Barriers to treatment: None identified  Patient Contracts (see media tab):  None  Substance Use: Not addressed in session   Continue or Discharge: Patient will continue in the insightful wellness group as planned. Patient is likely to benefit from learning and using skills as they work toward the goals identified in their treatment plan.       Cristina Francis, [facility]  October 26, 2023     The author of this note documented a reason for not sharing it with the patient.

## 2023-11-07 ENCOUNTER — DOCUMENTATION ONLY (OUTPATIENT)
Dept: HOME HEALTH SERVICES | Facility: CLINIC | Age: 28
End: 2023-11-07
Payer: COMMERCIAL

## 2024-03-03 NOTE — CONFIDENTIAL NOTE
[facility]  TRACK: [facility]     PATIENT'S NAME: Jose Alexander  MRN:   [id number]  :   1995  [id number]  DATE OF SERVICE: 23  START TIME: 3:00 PM  END TIME: 4:30 PM   Service modality:  In-person    Diagnoses: 296.33 (F33.2) Major Depressive Disorder, Recurrent Episode, Severe _ and With anxious distress;  Generalized Anxiety Disorder       Data:    Session content: At the start of this group, patients were invited to check in by identifying themselves, describing their current emotional status, and identifying issues to address in this group. Area(s) of treatment focus addressed in this session included Symptom Management. We reviewed the week as well as skills that were used and skills that could have been used. After check in, we reviewed skills that were used this week as well as skills that could have been used this past week. We reviewed and reflected on Grounding Techniques and areas of growth. Jose shares and discusses his most used forms of grounding including 5 to 1. He states this is a grounding skills that he uses quite often and works the best for him. He states he also enjoys doing categories with wither movie or music lists, and comedy by watching funny TV or movies. Something he would like to try is washing his face in cold water and stretching his body. Jose shares his insight on why these grounding techniques work best for him and why some don't. Moving into areas of growth, Jose states he has grown most in radical acceptance and self love. He shares how he is proud of himself for reaching out when he needed help and grateful for his supports and self care he has given himself.      Therapeutic Interventions/Treatment Strategies:  Psychotherapist offered support, feedback and validation, provided redirection, and reinforced use of skills. Treatment modalities used include Motivational Interviewing and Dialectical Behavioral Therapy.  Interventions include Mindfulness: Facilitated discussion of when/how to use mindfulness skills to benefit general health, mental health symptoms, and stressors and Encouraged a plan to use mindfulness skills in daily life, Symptoms Management: Promoted understanding of their diagnoses and how it impacts their functioning, Emotions Management:  Discussed barriers to emotional regulation, Reviewed opposite action skill, and Increased awareness of daily mood patterns/changes, and Relationship Skills: Assisted patients in implementing more effective communication skills in their relationships, Encouraged development and maintenance  of healthy boundaries, and Discussed relationships and ways to reduce conflict . After check in, we reviewed skills that were used this week as well as skills that could have been used this past week.      Assessment:     Patient response:   Patient responded to session by accepting feedback, giving feedback, listening, focusing on goals, being attentive, accepting support, appearing alert, and verbalizing understanding. Jose shares his week was nothing new and still waiting to hear from his work regarding his new schedule. He states his back is feeling a lot better and continues to use his heat cream for it. Jose is receptive to feedback regarding his emotional wellness and is able to identify areas of progress and struggle within his life regarding his mental wellness. Jose participated well and was able to fully engage with the group. He is intentional in working towards his goals and practice his skills.        Possible barriers to participation / learning include: and no barriers identified     Health Issues:              None reported                  Substance Use Review:              Substance Use: No active concerns identified.     Mental Status/Behavioral Observations  Appearance:                            Appropriate   Eye Contact:                           Good    Psychomotor Behavior:          Normal   Attitude:                                   Cooperative   Orientation:                             All  Speech              Rate / Production:       Normal               Volume:                       Normal   Mood:                                      Normal  Affect:                                      Appropriate   Thought Content:                    Clear  Thought Form:                        Coherent  Logical     Insight:                                     Good      Plan:   Safety Plan: No current safety concerns identified.  Recommended that patient call 911 or go to the local ED should there be a change in any of these risk factors.   Barriers to treatment: None identified  Patient Contracts (see media tab):  None  Substance Use: Not addressed in session   Continue or Discharge: Patient will continue in [facility] group as planned. Patient is likely to benefit from learning and using skills as they work toward the goals identified in their treatment plan.      SHANNA JEAN BAPTISTE  September 7, 2023           At approx 0530, pt woke up and felt "anxious and dizzy". Pt denying current dizziness, but reports feeling anxious. -chest pain. Pt awake and alert. No increased WOB. No PMHx. NKDA. Vaccines not up to date.

## 2024-07-06 ENCOUNTER — HEALTH MAINTENANCE LETTER (OUTPATIENT)
Age: 29
End: 2024-07-06

## 2025-07-13 ENCOUNTER — HEALTH MAINTENANCE LETTER (OUTPATIENT)
Age: 30
End: 2025-07-13